# Patient Record
Sex: FEMALE | Race: WHITE | NOT HISPANIC OR LATINO | Employment: OTHER | URBAN - METROPOLITAN AREA
[De-identification: names, ages, dates, MRNs, and addresses within clinical notes are randomized per-mention and may not be internally consistent; named-entity substitution may affect disease eponyms.]

---

## 2017-01-20 DIAGNOSIS — E78.5 HYPERLIPIDEMIA: ICD-10-CM

## 2017-01-20 RX ORDER — ATORVASTATIN CALCIUM 20 MG/1
TABLET, FILM COATED ORAL
Qty: 90 TABLET | Refills: 3 | Status: SHIPPED | OUTPATIENT
Start: 2017-01-20 | End: 2017-12-20 | Stop reason: SDUPTHER

## 2017-05-11 DIAGNOSIS — F41.9 ANXIETY: ICD-10-CM

## 2017-05-11 RX ORDER — GABAPENTIN 100 MG/1
CAPSULE ORAL
Qty: 360 CAPSULE | Refills: 1 | Status: SHIPPED | OUTPATIENT
Start: 2017-05-11 | End: 2017-09-07

## 2017-06-19 DIAGNOSIS — Z12.31 ENCOUNTER FOR SCREENING MAMMOGRAM FOR BREAST CANCER: Primary | ICD-10-CM

## 2017-06-20 ENCOUNTER — OFFICE VISIT (OUTPATIENT)
Dept: INTERNAL MEDICINE | Facility: CLINIC | Age: 67
End: 2017-06-20

## 2017-06-20 ENCOUNTER — APPOINTMENT (OUTPATIENT)
Dept: WOMENS IMAGING | Facility: HOSPITAL | Age: 67
End: 2017-06-20

## 2017-06-20 VITALS
WEIGHT: 122 LBS | TEMPERATURE: 98.3 F | BODY MASS INDEX: 20.83 KG/M2 | SYSTOLIC BLOOD PRESSURE: 130 MMHG | HEIGHT: 64 IN | DIASTOLIC BLOOD PRESSURE: 84 MMHG

## 2017-06-20 DIAGNOSIS — Z86.010 HISTORY OF COLONIC POLYPS: Chronic | ICD-10-CM

## 2017-06-20 DIAGNOSIS — E78.49 OTHER HYPERLIPIDEMIA: Primary | Chronic | ICD-10-CM

## 2017-06-20 DIAGNOSIS — J30.2 SEASONAL ALLERGIC RHINITIS, UNSPECIFIED ALLERGIC RHINITIS TRIGGER: Chronic | ICD-10-CM

## 2017-06-20 DIAGNOSIS — Z12.31 ENCOUNTER FOR SCREENING MAMMOGRAM FOR BREAST CANCER: ICD-10-CM

## 2017-06-20 DIAGNOSIS — R10.11 RIGHT UPPER QUADRANT ABDOMINAL PAIN: ICD-10-CM

## 2017-06-20 DIAGNOSIS — I10 ESSENTIAL HYPERTENSION: ICD-10-CM

## 2017-06-20 LAB
ALBUMIN SERPL-MCNC: 4.4 G/DL (ref 3.4–4.6)
ALBUMIN/GLOB SERPL: 1.6 G/DL
ALP SERPL-CCNC: 57 U/L (ref 46–116)
ALT SERPL W P-5'-P-CCNC: 29 U/L (ref 14–59)
ANION GAP SERPL CALCULATED.3IONS-SCNC: 10 MMOL/L
AST SERPL-CCNC: 26 U/L (ref 7–37)
BILIRUB SERPL-MCNC: 0.7 MG/DL (ref 0.2–1)
BUN BLD-MCNC: 17 MG/DL (ref 6–22)
BUN/CREAT SERPL: 23 (ref 7–25)
CALCIUM SPEC-SCNC: 9 MG/DL (ref 8.6–10.5)
CHLORIDE SERPL-SCNC: 103 MMOL/L (ref 95–107)
CHOLEST SERPL-MCNC: 156 MG/DL (ref 0–200)
CO2 SERPL-SCNC: 29 MMOL/L (ref 23–32)
CREAT BLD-MCNC: 0.74 MG/DL (ref 0.55–1.02)
GFR SERPL CREATININE-BSD FRML MDRD: 79 ML/MIN/1.73
GLOBULIN UR ELPH-MCNC: 2.8 GM/DL
GLUCOSE BLD-MCNC: 95 MG/DL (ref 70–100)
HDLC SERPL-MCNC: 79 MG/DL (ref 40–81)
LDLC SERPL CALC-MCNC: 62 MG/DL (ref 0–100)
LDLC/HDLC SERPL: 0.79 {RATIO}
POTASSIUM BLD-SCNC: 4.6 MMOL/L (ref 3.3–5.3)
PROT SERPL-MCNC: 7.2 G/DL (ref 6.3–8.4)
SODIUM BLD-SCNC: 142 MMOL/L (ref 136–145)
TRIGL SERPL-MCNC: 73 MG/DL (ref 0–150)
VLDLC SERPL-MCNC: 14.6 MG/DL

## 2017-06-20 PROCEDURE — 77067 SCR MAMMO BI INCL CAD: CPT | Performed by: RADIOLOGY

## 2017-06-20 PROCEDURE — 99214 OFFICE O/P EST MOD 30 MIN: CPT | Performed by: INTERNAL MEDICINE

## 2017-06-20 PROCEDURE — 36415 COLL VENOUS BLD VENIPUNCTURE: CPT | Performed by: INTERNAL MEDICINE

## 2017-06-20 PROCEDURE — 80053 COMPREHEN METABOLIC PANEL: CPT | Performed by: INTERNAL MEDICINE

## 2017-06-20 PROCEDURE — 80061 LIPID PANEL: CPT | Performed by: INTERNAL MEDICINE

## 2017-06-20 PROCEDURE — 77067 SCR MAMMO BI INCL CAD: CPT | Performed by: INTERNAL MEDICINE

## 2017-07-05 ENCOUNTER — HOSPITAL ENCOUNTER (OUTPATIENT)
Dept: ULTRASOUND IMAGING | Facility: HOSPITAL | Age: 67
Discharge: HOME | End: 2017-07-05
Attending: INTERNAL MEDICINE | Admitting: INTERNAL MEDICINE

## 2017-07-05 DIAGNOSIS — R10.11 RIGHT UPPER QUADRANT ABDOMINAL PAIN: ICD-10-CM

## 2017-07-05 PROCEDURE — 76705 ECHO EXAM OF ABDOMEN: CPT

## 2017-08-28 ENCOUNTER — APPOINTMENT (OUTPATIENT)
Dept: LAB | Facility: HOSPITAL | Age: 67
End: 2017-08-28

## 2017-08-28 ENCOUNTER — APPOINTMENT (OUTPATIENT)
Dept: ONCOLOGY | Facility: CLINIC | Age: 67
End: 2017-08-28

## 2017-09-07 ENCOUNTER — LAB (OUTPATIENT)
Dept: LAB | Facility: HOSPITAL | Age: 67
End: 2017-09-07

## 2017-09-07 ENCOUNTER — OFFICE VISIT (OUTPATIENT)
Dept: ONCOLOGY | Facility: CLINIC | Age: 67
End: 2017-09-07

## 2017-09-07 VITALS
SYSTOLIC BLOOD PRESSURE: 120 MMHG | DIASTOLIC BLOOD PRESSURE: 82 MMHG | WEIGHT: 125 LBS | TEMPERATURE: 98 F | HEIGHT: 64 IN | BODY MASS INDEX: 21.34 KG/M2 | RESPIRATION RATE: 16 BRPM | HEART RATE: 76 BPM

## 2017-09-07 DIAGNOSIS — D05.02 NEOPLASM OF LEFT BREAST, PRIMARY TUMOR STAGING CATEGORY TIS: LOBULAR CARCINOMA IN SITU (LCIS): Primary | ICD-10-CM

## 2017-09-07 DIAGNOSIS — E78.5 HYPERLIPIDEMIA, UNSPECIFIED HYPERLIPIDEMIA TYPE: Primary | ICD-10-CM

## 2017-09-07 LAB
BASOPHILS # BLD AUTO: 0.03 10*3/MM3 (ref 0–0.1)
BASOPHILS NFR BLD AUTO: 0.4 % (ref 0–1.1)
DEPRECATED RDW RBC AUTO: 41 FL (ref 37–49)
EOSINOPHIL # BLD AUTO: 0.08 10*3/MM3 (ref 0–0.36)
EOSINOPHIL NFR BLD AUTO: 1.1 % (ref 1–5)
ERYTHROCYTE [DISTWIDTH] IN BLOOD BY AUTOMATED COUNT: 12.7 % (ref 11.7–14.5)
HCT VFR BLD AUTO: 42.9 % (ref 34–45)
HGB BLD-MCNC: 14.8 G/DL (ref 11.5–14.9)
IMM GRANULOCYTES # BLD: 0.03 10*3/MM3 (ref 0–0.03)
IMM GRANULOCYTES NFR BLD: 0.4 % (ref 0–0.5)
LYMPHOCYTES # BLD AUTO: 2.24 10*3/MM3 (ref 1–3.5)
LYMPHOCYTES NFR BLD AUTO: 29.9 % (ref 20–49)
MCH RBC QN AUTO: 30.3 PG (ref 27–33)
MCHC RBC AUTO-ENTMCNC: 34.5 G/DL (ref 32–35)
MCV RBC AUTO: 87.9 FL (ref 83–97)
MONOCYTES # BLD AUTO: 0.74 10*3/MM3 (ref 0.25–0.8)
MONOCYTES NFR BLD AUTO: 9.9 % (ref 4–12)
NEUTROPHILS # BLD AUTO: 4.36 10*3/MM3 (ref 1.5–7)
NEUTROPHILS NFR BLD AUTO: 58.3 % (ref 39–75)
NRBC BLD MANUAL-RTO: 0 /100 WBC (ref 0–0)
PLATELET # BLD AUTO: 192 10*3/MM3 (ref 150–375)
PMV BLD AUTO: 9.6 FL (ref 8.9–12.1)
RBC # BLD AUTO: 4.88 10*6/MM3 (ref 3.9–5)
WBC NRBC COR # BLD: 7.48 10*3/MM3 (ref 4–10)

## 2017-09-07 PROCEDURE — 99213 OFFICE O/P EST LOW 20 MIN: CPT | Performed by: INTERNAL MEDICINE

## 2017-09-07 PROCEDURE — 85025 COMPLETE CBC W/AUTO DIFF WBC: CPT | Performed by: INTERNAL MEDICINE

## 2017-09-07 PROCEDURE — 36416 COLLJ CAPILLARY BLOOD SPEC: CPT | Performed by: INTERNAL MEDICINE

## 2017-09-07 RX ORDER — GABAPENTIN 100 MG/1
100 CAPSULE ORAL 2 TIMES DAILY
COMMUNITY
End: 2017-12-20

## 2017-09-07 NOTE — PROGRESS NOTES
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 She had lumpectomy and took 5 years of tamoxifen as breast cancer prevention. She finished her tamoxifen over 10 years ago . She is here today for an annual followup. Her mammogram was performed in June, with no suspicious findings.     It is been recommended that due to her history and the fact that she has dense breasts that she undergo 3-D mammogram yearly.    She is up to date on her colonoscopies  History of Present Illness     Past Medical History, Past Surgical History, Social History, Family History have been reviewed and are without significant changes except as mentioned.    Review of Systems   Constitutional: Negative for activity change, appetite change, fatigue, fever and unexpected weight change.   HENT: Negative for hearing loss, nosebleeds, trouble swallowing and voice change.    Eyes: Negative for visual disturbance.   Respiratory: Negative for cough, shortness of breath and wheezing.    Cardiovascular: Negative for chest pain and palpitations.   Gastrointestinal: Negative for abdominal pain, diarrhea, nausea and vomiting.   Genitourinary: Negative for difficulty urinating, frequency, hematuria and urgency.   Musculoskeletal: Negative for back pain and neck pain.   Skin: Negative for rash.   Neurological: Negative for dizziness, seizures, syncope and headaches.   Hematological: Negative for adenopathy. Does not bruise/bleed easily.   Psychiatric/Behavioral: Negative for behavioral problems. The patient is not nervous/anxious.       A comprehensive 14 point review of systems was performed and was negative except as mentioned.    Medications:  The current medication list was reviewed in the EMR    ALLERGIES:  No Known Allergies    Objective      Vitals:     "09/07/17 0954   BP: 120/82   Pulse: 76   Resp: 16   Temp: 98 °F (36.7 °C)   Weight: 125 lb (56.7 kg)   Height: 63.78\" (162 cm)  Comment: new ht.   PainSc: 0-No pain     Current Status 9/7/2017   ECOG score 0       Physical Exam   Constitutional: She is oriented to person, place, and time. She appears well-developed and well-nourished. No distress.   HENT:   Head: Normocephalic.   Eyes: EOM are normal. Pupils are equal, round, and reactive to light.   Neck: Neck supple. No JVD present. No thyromegaly present.   Cardiovascular: Normal rate and regular rhythm.  Exam reveals no gallop and no friction rub.    No murmur heard.  Pulmonary/Chest: Effort normal and breath sounds normal. She has no wheezes. She has no rales. Right breast exhibits no mass, no nipple discharge and no skin change. Left breast exhibits no mass, no nipple discharge and no skin change.   Abdominal: Soft. Bowel sounds are normal. She exhibits no distension and no mass. There is no tenderness.   Musculoskeletal: She exhibits no edema or deformity.   Neurological: She is alert and oriented to person, place, and time. She has normal reflexes. No cranial nerve deficit.   Skin: Skin is dry. No rash noted.   Psychiatric: She has a normal mood and affect. Judgment normal.        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 She continues to do well with no evidence of malignancy on exam or on " her mammograms.     PLAN:   1. The patient will return to see us again in one year for routine followup.   2. She will be due for annual screening mammogram again in June 2018. Because of her history and the fact that she has dense breasts it has been recommended that she undergo 3-D mammographic imaging yearly therefore her next mammogram in June 2018 may need to be scheduled at [facility].              9/7/2017      CC:

## 2017-12-20 ENCOUNTER — OFFICE VISIT (OUTPATIENT)
Dept: INTERNAL MEDICINE | Facility: CLINIC | Age: 67
End: 2017-12-20

## 2017-12-20 ENCOUNTER — PATIENT MESSAGE (OUTPATIENT)
Dept: INTERNAL MEDICINE | Facility: CLINIC | Age: 67
End: 2017-12-20

## 2017-12-20 VITALS
DIASTOLIC BLOOD PRESSURE: 84 MMHG | TEMPERATURE: 97.9 F | BODY MASS INDEX: 21.97 KG/M2 | HEIGHT: 63 IN | WEIGHT: 124 LBS | HEART RATE: 70 BPM | RESPIRATION RATE: 17 BRPM | OXYGEN SATURATION: 98 % | SYSTOLIC BLOOD PRESSURE: 134 MMHG

## 2017-12-20 DIAGNOSIS — E78.2 MIXED HYPERLIPIDEMIA: ICD-10-CM

## 2017-12-20 DIAGNOSIS — I10 ESSENTIAL HYPERTENSION: Primary | ICD-10-CM

## 2017-12-20 LAB
ALBUMIN SERPL-MCNC: 4.6 G/DL (ref 3.5–5.2)
ALBUMIN/GLOB SERPL: 1.7 G/DL
ALP SERPL-CCNC: 60 U/L (ref 39–117)
ALT SERPL-CCNC: 22 U/L (ref 1–33)
AST SERPL-CCNC: 23 U/L (ref 1–32)
BILIRUB SERPL-MCNC: 0.4 MG/DL (ref 0.1–1.2)
BILIRUB UR QL STRIP: NEGATIVE
BUN SERPL-MCNC: 19 MG/DL (ref 8–23)
BUN/CREAT SERPL: 25.7 (ref 7–25)
CALCIUM SERPL-MCNC: 9.6 MG/DL (ref 8.6–10.5)
CHLORIDE SERPL-SCNC: 100 MMOL/L (ref 98–107)
CHOLEST SERPL-MCNC: 159 MG/DL (ref 0–200)
CLARITY UR: CLEAR
CO2 SERPL-SCNC: 28.3 MMOL/L (ref 22–29)
COLOR UR: YELLOW
CREAT SERPL-MCNC: 0.74 MG/DL (ref 0.57–1)
GLOBULIN SER CALC-MCNC: 2.7 GM/DL
GLUCOSE SERPL-MCNC: 81 MG/DL (ref 65–99)
GLUCOSE UR STRIP-MCNC: NEGATIVE MG/DL
HDLC SERPL-MCNC: 87 MG/DL (ref 40–60)
HGB UR QL STRIP.AUTO: NEGATIVE
KETONES UR QL STRIP: NEGATIVE
LDLC SERPL CALC-MCNC: 60 MG/DL (ref 0–100)
LEUKOCYTE ESTERASE UR QL STRIP.AUTO: NEGATIVE
NITRITE UR QL STRIP: NEGATIVE
PH UR STRIP.AUTO: 5.5 [PH] (ref 5–8)
POTASSIUM SERPL-SCNC: 4.4 MMOL/L (ref 3.5–5.2)
PROT SERPL-MCNC: 7.3 G/DL (ref 6–8.5)
PROT UR QL STRIP: NEGATIVE
SODIUM SERPL-SCNC: 141 MMOL/L (ref 136–145)
SP GR UR STRIP: 1.02 (ref 1–1.03)
TRIGL SERPL-MCNC: 61 MG/DL (ref 0–150)
UROBILINOGEN UR QL STRIP: NORMAL
VLDLC SERPL-MCNC: 12.2 MG/DL (ref 5–40)

## 2017-12-20 PROCEDURE — 99213 OFFICE O/P EST LOW 20 MIN: CPT | Performed by: INTERNAL MEDICINE

## 2017-12-20 PROCEDURE — 81003 URINALYSIS AUTO W/O SCOPE: CPT | Performed by: INTERNAL MEDICINE

## 2017-12-20 PROCEDURE — G0402 INITIAL PREVENTIVE EXAM: HCPCS | Performed by: INTERNAL MEDICINE

## 2017-12-20 RX ORDER — ATORVASTATIN CALCIUM 20 MG/1
20 TABLET, FILM COATED ORAL DAILY
Qty: 90 TABLET | Refills: 1 | Status: SHIPPED | OUTPATIENT
Start: 2017-12-20 | End: 2018-09-06 | Stop reason: SDUPTHER

## 2017-12-20 NOTE — TELEPHONE ENCOUNTER
From: Mag Palomino  To: Purvi Martinez MD  Sent: 12/20/2017 1:41 PM EST  Subject: Visit Follow-Up Question    Dr. Martinez'  I received the PPPSV23 shot at the pharmacy at the [facility] on 12/20/17.

## 2017-12-20 NOTE — PROGRESS NOTES
"Subjective   Mag Palomino is a 67 y.o. female here for   Chief Complaint   Patient presents with   • Initial Medicare Wellness   • Hypertension   • Hyperlipidemia   .    Vitals:    12/20/17 0849 12/20/17 0958   BP: 124/86 134/84   BP Location: Left arm    Patient Position: Sitting    Cuff Size: Adult    Pulse: 70    Resp: 17    Temp: 97.9 °F (36.6 °C)    TempSrc: Temporal Artery     SpO2: 98%    Weight: 56.2 kg (124 lb)    Height: 160.7 cm (63.25\")        Body mass index is 21.79 kg/(m^2).    Hypertension   This is a chronic problem. The current episode started more than 1 year ago. The problem is unchanged. The problem is controlled. Pertinent negatives include no chest pain, palpitations or shortness of breath. There is no history of chronic renal disease.   Hyperlipidemia   This is a chronic problem. The current episode started more than 1 year ago. The problem is controlled. Recent lipid tests were reviewed and are normal. She has no history of chronic renal disease, diabetes, liver disease or obesity. Pertinent negatives include no chest pain or shortness of breath.        The following portions of the patient's history were reviewed and updated as appropriate: allergies, current medications, past social history and problem list.    Review of Systems   Constitutional: Negative for chills, fatigue and fever.   Respiratory: Negative for cough, shortness of breath and wheezing.    Cardiovascular: Negative for chest pain, palpitations and leg swelling.   Psychiatric/Behavioral: Negative for dysphoric mood and sleep disturbance. The patient is not nervous/anxious.        Objective   Physical Exam   Constitutional: She appears well-developed and well-nourished. No distress.   Cardiovascular: Normal rate, regular rhythm and normal heart sounds.    Pulmonary/Chest: No respiratory distress. She has no wheezes. She has no rales. She exhibits no tenderness.   Musculoskeletal: She exhibits no edema.   Psychiatric: " She has a normal mood and affect. Her behavior is normal.   Nursing note and vitals reviewed.      Assessment/Plan   Diagnoses and all orders for this visit:    Essential hypertension  Comments:  taking aldactone for skin per derm - she would likely have high bp without this med - call if bp over 140/90  Orders:  -     Comprehensive Metabolic Panel; Future  -     Lipid Panel; Future  -     Urinalysis With / Microscopic If Indicated - Urine, Clean Catch; Future    Mixed hyperlipidemia  Comments:  continue lipitor, diet & exercise  Orders:  -     atorvastatin (LIPITOR) 20 MG tablet; Take 1 tablet by mouth Daily.  -     Lipid Panel; Future          Wellness today.   Need daily strengthening & balance exercises (shown today).   Need screening for AAA & carotid disease.  Information given today.

## 2017-12-20 NOTE — PROGRESS NOTES
QUICK REFERENCE INFORMATION:  The ABCs of the Annual Wellness Visit    Initial Medicare Wellness Visit    HEALTH RISK ASSESSMENT    1950    Recent Hospitalizations:  No hospitalization(s) within the last year..        Current Medical Providers:  Patient Care Team:  Purvi Martinez MD as PCP - General  Michael James MD as Consulting Physician (Hematology and Oncology)  Yessy Hui MD as Consulting Physician (Dermatology)  Yifan Salmeron MD as Consulting Physician (Otolaryngology)  Jose De La Torre MD as Consulting Physician (Cardiology)  Purvi Martinez MD as Referring Physician (Internal Medicine)        Smoking Status:  History   Smoking Status   • Never Smoker   Smokeless Tobacco   • Never Used       Alcohol Consumption:  History   Alcohol Use   • Yes     Comment: social       Depression Screen:   PHQ-2/PHQ-9 Depression Screening 12/20/2017   Little interest or pleasure in doing things 0   Feeling down, depressed, or hopeless 0   Trouble falling or staying asleep, or sleeping too much 0   Feeling tired or having little energy 0   Poor appetite or overeating 0   Feeling bad about yourself - or that you are a failure or have let yourself or your family down 0   Trouble concentrating on things, such as reading the newspaper or watching television 0   Moving or speaking so slowly that other people could have noticed. Or the opposite - being so fidgety or restless that you have been moving around a lot more than usual 0   Thoughts that you would be better off dead, or of hurting yourself in some way 0   Total Score 0       Health Habits and Functional and Cognitive Screening:  No flowsheet data found.        Does the patient have evidence of cognitive impairment? No    Asiprin use counseling: Does not need ASA (and currently is not on it)      Recent Lab Results:    Visual Acuity:  No exam data present    Age-appropriate Screening Schedule:  Refer to the list below for future screening  recommendations based on patient's age, sex and/or medical conditions. Orders for these recommended tests are listed in the plan section. The patient has been provided with a written plan.    Health Maintenance   Topic Date Due   • PNEUMOCOCCAL VACCINES (65+ LOW/MEDIUM RISK) (2 of 2 - PPSV23) 10/06/2017   • LIPID PANEL  12/20/2018   • MAMMOGRAM  06/23/2019   • COLONOSCOPY  07/16/2020   • TDAP/TD VACCINES (2 - Td) 08/01/2023   • INFLUENZA VACCINE  Completed   • ZOSTER VACCINE  Completed        Subjective   History of Present Illness    Mag Palomino is a 67 y.o. female who presents for an Annual Wellness Visit.    The following portions of the patient's history were reviewed and updated as appropriate: allergies, current medications, past family history, past medical history, past social history, past surgical history and problem list.    Outpatient Medications Prior to Visit   Medication Sig Dispense Refill   • acetaminophen (TYLENOL) 325 MG tablet Take 650 mg by mouth Every 6 (Six) Hours As Needed for mild pain (1-3).     • cetirizine (ZyrTEC) 10 MG tablet Take 10 mg by mouth daily.     • docusate sodium (COLACE) 100 MG capsule Take 100 mg by mouth as needed for constipation.     • Multiple Vitamin (MULTIVITAMINS PO) Take 1 tablet by mouth Daily.     • Omega-3 Fatty Acids (FISH OIL) 1000 MG capsule capsule Take 1,000 mg by mouth Daily With Breakfast.     • spironolactone (ALDACTONE) 100 MG tablet Take 1 tablet by mouth daily.     • atorvastatin (LIPITOR) 20 MG tablet TAKE 1 TABLET DAILY 90 tablet 3   • gabapentin (NEURONTIN) 100 MG capsule Take 100 mg by mouth 2 (Two) Times a Day.       No facility-administered medications prior to visit.        Patient Active Problem List   Diagnosis   • Allergic rhinitis   • Backache   • Hyperlipidemia   • Dizzy   • History of colonic polyps   • Palpitation   • Neoplasm of left breast, primary tumor staging category Tis: lobular carcinoma in situ (LCIS)   • Arm pain   •  "Constipation   • Hair loss   • Hypertension       Advance Care Planning:  has an advance directive - a copy has been provided and is in file    Identification of Risk Factors:  Risk factors include: none.    Review of Systems    Compared to one year ago, the patient feels her physical health is the same.  Compared to one year ago, the patient feels her mental health is the same.    Objective     Physical Exam    Vitals:    12/20/17 0849   BP: 124/86   BP Location: Left arm   Patient Position: Sitting   Cuff Size: Adult   Pulse: 70   Resp: 17   Temp: 97.9 °F (36.6 °C)   TempSrc: Temporal Artery    SpO2: 98%   Weight: 56.2 kg (124 lb)   Height: 160.7 cm (63.25\")   PainSc: 0-No pain       Body mass index is 21.79 kg/(m^2).  Discussed the patient's BMI with her. BMI is within normal parameters. No follow-up required.    Assessment/Plan   Patient Self-Management and Personalized Health Advice  The patient has been provided with information about: diet, exercise, weight management, prevention of cardiac or vascular disease, the relationship between weight and GERD and fall prevention and preventive services including:   · Exercise counseling provided, Fall Risk assessment done, Fall Risk plan of care done, Nutrition counseling provided, Screening for AAA, referral for ultrasound placed.    Visit Diagnoses:    ICD-10-CM ICD-9-CM   1. Essential hypertension I10 401.9   2. Mixed hyperlipidemia E78.2 272.2       Orders Placed This Encounter   Procedures   • Comprehensive Metabolic Panel     Standing Status:   Future     Standing Expiration Date:   12/20/2018   • Lipid Panel     Standing Status:   Future     Standing Expiration Date:   12/20/2018   • Urinalysis With / Microscopic If Indicated - Urine, Clean Catch     Standing Status:   Future     Standing Expiration Date:   12/20/2018       Outpatient Encounter Prescriptions as of 12/20/2017   Medication Sig Dispense Refill   • acetaminophen (TYLENOL) 325 MG tablet Take 650 mg " by mouth Every 6 (Six) Hours As Needed for mild pain (1-3).     • atorvastatin (LIPITOR) 20 MG tablet Take 1 tablet by mouth Daily. 90 tablet 1   • cetirizine (ZyrTEC) 10 MG tablet Take 10 mg by mouth daily.     • docusate sodium (COLACE) 100 MG capsule Take 100 mg by mouth as needed for constipation.     • Multiple Vitamin (MULTIVITAMINS PO) Take 1 tablet by mouth Daily.     • Omega-3 Fatty Acids (FISH OIL) 1000 MG capsule capsule Take 1,000 mg by mouth Daily With Breakfast.     • spironolactone (ALDACTONE) 100 MG tablet Take 1 tablet by mouth daily.     • [DISCONTINUED] atorvastatin (LIPITOR) 20 MG tablet TAKE 1 TABLET DAILY 90 tablet 3   • [DISCONTINUED] gabapentin (NEURONTIN) 100 MG capsule Take 100 mg by mouth 2 (Two) Times a Day.       No facility-administered encounter medications on file as of 12/20/2017.        Reviewed use of high risk medication in the elderly: not applicable  Reviewed for potential of harmful drug interactions in the elderly: not applicable    Follow Up:  Return in about 6 months (around 6/20/2018) for Recheck.     An After Visit Summary and PPPS with all of these plans were given to the patient.

## 2017-12-20 NOTE — PATIENT INSTRUCTIONS
Medicare Wellness  Personal Prevention Plan of Service     Date of Office Visit:  2017  Encounter Provider:  Purvi Martinez MD  Place of Service:  [facility]  Patient Name: Mag Palomino  :  1950    As part of the Medicare Wellness portion of your visit today, we are providing you with this personalized preventive plan of services (PPPS). This plan is based upon recommendations of the [place] Preventive Services Task Force (USPSTF) and the Advisory Committee on Immunization Practices (ACIP).    This lists the preventive care services that should be considered, and provides dates of when you are due. Items listed as completed are up-to-date and do not require any further intervention.    Health Maintenance   Topic Date Due   • PNEUMOCOCCAL VACCINES (65+ LOW/MEDIUM RISK) (2 of 2 - PPSV23) 10/06/2017   • MEDICARE ANNUAL WELLNESS  2018   • LIPID PANEL  2018   • MAMMOGRAM  2019   • COLONOSCOPY  2020   • TDAP/TD VACCINES (2 - Td) 2023   • HEPATITIS C SCREENING  Completed   • INFLUENZA VACCINE  Completed   • ZOSTER VACCINE  Completed       Orders Placed This Encounter   Procedures   • Comprehensive Metabolic Panel     Standing Status:   Future     Standing Expiration Date:   2018   • Lipid Panel     Standing Status:   Future     Standing Expiration Date:   2018   • Urinalysis With / Microscopic If Indicated - Urine, Clean Catch     Standing Status:   Future     Standing Expiration Date:   2018       No Follow-up on file.

## 2017-12-21 ENCOUNTER — DOCUMENTATION (OUTPATIENT)
Dept: INTERNAL MEDICINE | Facility: CLINIC | Age: 67
End: 2017-12-21

## 2017-12-21 DIAGNOSIS — Z12.31 ENCOUNTER FOR SCREENING MAMMOGRAM FOR BREAST CANCER: Primary | ICD-10-CM

## 2017-12-21 NOTE — PROGRESS NOTES
Her oncologist wants her to get 3D mammo in 6 mos (screening) b/c dense breasts - ordered screening bilat mammo with meredith

## 2018-06-20 ENCOUNTER — OFFICE VISIT (OUTPATIENT)
Dept: INTERNAL MEDICINE | Facility: CLINIC | Age: 68
End: 2018-06-20

## 2018-06-20 VITALS
BODY MASS INDEX: 21.76 KG/M2 | DIASTOLIC BLOOD PRESSURE: 90 MMHG | SYSTOLIC BLOOD PRESSURE: 144 MMHG | HEIGHT: 63 IN | WEIGHT: 122.8 LBS

## 2018-06-20 DIAGNOSIS — E78.5 HYPERLIPIDEMIA, UNSPECIFIED HYPERLIPIDEMIA TYPE: Chronic | ICD-10-CM

## 2018-06-20 DIAGNOSIS — I10 ESSENTIAL HYPERTENSION: Primary | ICD-10-CM

## 2018-06-20 LAB
CHOLEST SERPL-MCNC: 162 MG/DL (ref 0–200)
HDLC SERPL-MCNC: 72 MG/DL (ref 40–60)
LDLC SERPL CALC-MCNC: 71 MG/DL (ref 0–100)
LDLC/HDLC SERPL: 0.99 {RATIO}
TRIGL SERPL-MCNC: 95 MG/DL (ref 0–150)
VLDLC SERPL-MCNC: 19 MG/DL (ref 5–40)

## 2018-06-20 PROCEDURE — 36415 COLL VENOUS BLD VENIPUNCTURE: CPT | Performed by: INTERNAL MEDICINE

## 2018-06-20 PROCEDURE — 99213 OFFICE O/P EST LOW 20 MIN: CPT | Performed by: INTERNAL MEDICINE

## 2018-06-20 PROCEDURE — 80061 LIPID PANEL: CPT | Performed by: INTERNAL MEDICINE

## 2018-06-20 NOTE — PROGRESS NOTES
"Subjective   Mag Palomino is a 68 y.o. female here for   Chief Complaint   Patient presents with   • Hyperlipidemia     6 month follow-up   • Hypertension   .    Vitals:    06/20/18 0842   BP: 144/90   BP Location: Left arm   Patient Position: Sitting   Cuff Size: Adult   Weight: 55.7 kg (122 lb 12.8 oz)   Height: 160.7 cm (63.25\")       Body mass index is 21.58 kg/m².    Hyperlipidemia   This is a chronic problem. The current episode started more than 1 year ago. The problem is controlled. Recent lipid tests were reviewed and are normal. She has no history of diabetes or obesity. Pertinent negatives include no chest pain or shortness of breath.   Hypertension   This is a chronic problem. The current episode started more than 1 year ago. The problem is unchanged. The problem is controlled. Pertinent negatives include no chest pain, palpitations or shortness of breath.        The following portions of the patient's history were reviewed and updated as appropriate: allergies, current medications, past social history and problem list.    Review of Systems   Constitutional: Negative for chills, fatigue and fever.   HENT: Positive for postnasal drip.    Respiratory: Negative for cough, shortness of breath and wheezing.    Cardiovascular: Negative for chest pain, palpitations and leg swelling.   Allergic/Immunologic: Positive for environmental allergies.   Psychiatric/Behavioral: Negative for dysphoric mood and sleep disturbance. The patient is not nervous/anxious.        Objective   Physical Exam   Constitutional: She appears well-developed and well-nourished. No distress.   Cardiovascular: Normal rate, regular rhythm and normal heart sounds.    Pulmonary/Chest: No respiratory distress. She has no wheezes. She has no rales. She exhibits no tenderness.   Musculoskeletal: She exhibits no edema.   Psychiatric: She has a normal mood and affect. Her behavior is normal.   Nursing note and vitals " reviewed.      Assessment/Plan   Diagnoses and all orders for this visit:    Essential hypertension  Comments:  stable - call if bp over 140/90    Hyperlipidemia, unspecified hyperlipidemia type  Comments:  continue diet/ex  Orders:  -     Lipid Panel; Future

## 2018-07-10 ENCOUNTER — HOSPITAL ENCOUNTER (OUTPATIENT)
Dept: MAMMOGRAPHY | Facility: HOSPITAL | Age: 68
Discharge: HOME | End: 2018-07-10
Attending: INTERNAL MEDICINE | Admitting: INTERNAL MEDICINE

## 2018-07-10 DIAGNOSIS — Z12.31 ENCOUNTER FOR SCREENING MAMMOGRAM FOR BREAST CANCER: ICD-10-CM

## 2018-07-10 PROCEDURE — 77063 BREAST TOMOSYNTHESIS BI: CPT

## 2018-07-10 PROCEDURE — 77067 SCR MAMMO BI INCL CAD: CPT

## 2018-09-06 DIAGNOSIS — E78.2 MIXED HYPERLIPIDEMIA: ICD-10-CM

## 2018-09-06 RX ORDER — ATORVASTATIN CALCIUM 20 MG/1
TABLET, FILM COATED ORAL
Qty: 90 TABLET | Refills: 1 | Status: SHIPPED | OUTPATIENT
Start: 2018-09-06 | End: 2019-01-21 | Stop reason: SDUPTHER

## 2018-09-11 ENCOUNTER — OFFICE VISIT (OUTPATIENT)
Dept: ONCOLOGY | Facility: CLINIC | Age: 68
End: 2018-09-11

## 2018-09-11 ENCOUNTER — APPOINTMENT (OUTPATIENT)
Dept: OTHER | Facility: HOSPITAL | Age: 68
End: 2018-09-11

## 2018-09-11 ENCOUNTER — APPOINTMENT (OUTPATIENT)
Dept: ONCOLOGY | Facility: CLINIC | Age: 68
End: 2018-09-11

## 2018-09-11 ENCOUNTER — LAB (OUTPATIENT)
Dept: OTHER | Facility: HOSPITAL | Age: 68
End: 2018-09-11

## 2018-09-11 VITALS
HEART RATE: 77 BPM | WEIGHT: 125 LBS | OXYGEN SATURATION: 99 % | RESPIRATION RATE: 16 BRPM | HEIGHT: 64 IN | DIASTOLIC BLOOD PRESSURE: 74 MMHG | SYSTOLIC BLOOD PRESSURE: 134 MMHG | BODY MASS INDEX: 21.34 KG/M2 | TEMPERATURE: 97.9 F

## 2018-09-11 DIAGNOSIS — D05.02 NEOPLASM OF LEFT BREAST, PRIMARY TUMOR STAGING CATEGORY TIS: LOBULAR CARCINOMA IN SITU (LCIS): Primary | ICD-10-CM

## 2018-09-11 LAB
BASOPHILS # BLD AUTO: 0.03 10*3/MM3 (ref 0–0.2)
BASOPHILS NFR BLD AUTO: 0.4 % (ref 0–1.5)
DEPRECATED RDW RBC AUTO: 41.2 FL (ref 37–54)
EOSINOPHIL # BLD AUTO: 0.04 10*3/MM3 (ref 0–0.7)
EOSINOPHIL NFR BLD AUTO: 0.5 % (ref 0.3–6.2)
ERYTHROCYTE [DISTWIDTH] IN BLOOD BY AUTOMATED COUNT: 12.6 % (ref 11.7–13)
HCT VFR BLD AUTO: 43.1 % (ref 35.6–45.5)
HGB BLD-MCNC: 14.5 G/DL (ref 11.9–15.5)
IMM GRANULOCYTES # BLD: 0.02 10*3/MM3 (ref 0–0.03)
IMM GRANULOCYTES NFR BLD: 0.3 % (ref 0–0.5)
LYMPHOCYTES # BLD AUTO: 1.81 10*3/MM3 (ref 0.9–4.8)
LYMPHOCYTES NFR BLD AUTO: 24.4 % (ref 19.6–45.3)
MCH RBC QN AUTO: 30 PG (ref 26.9–32)
MCHC RBC AUTO-ENTMCNC: 33.6 G/DL (ref 32.4–36.3)
MCV RBC AUTO: 89 FL (ref 80.5–98.2)
MONOCYTES # BLD AUTO: 0.55 10*3/MM3 (ref 0.2–1.2)
MONOCYTES NFR BLD AUTO: 7.4 % (ref 5–12)
NEUTROPHILS # BLD AUTO: 4.97 10*3/MM3 (ref 1.9–8.1)
NEUTROPHILS NFR BLD AUTO: 67 % (ref 42.7–76)
NRBC BLD MANUAL-RTO: 0 /100 WBC (ref 0–0)
PLATELET # BLD AUTO: 224 10*3/MM3 (ref 140–500)
PMV BLD AUTO: 9.5 FL (ref 6–12)
RBC # BLD AUTO: 4.84 10*6/MM3 (ref 3.9–5.2)
WBC NRBC COR # BLD: 7.42 10*3/MM3 (ref 4.5–10.7)

## 2018-09-11 PROCEDURE — 99213 OFFICE O/P EST LOW 20 MIN: CPT | Performed by: INTERNAL MEDICINE

## 2018-09-11 PROCEDURE — 85025 COMPLETE CBC W/AUTO DIFF WBC: CPT | Performed by: INTERNAL MEDICINE

## 2018-09-11 PROCEDURE — 36415 COLL VENOUS BLD VENIPUNCTURE: CPT

## 2018-09-11 NOTE — PROGRESS NOTES
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 She had lumpectomy and took 5 years of tamoxifen as breast cancer prevention. She finished her tamoxifen over 10 years ago . She is here today for an annual followup. Her mammogram was performed 7/10/2018 with no suspicious findings.    It is been recommended that due to her history and the fact that she has dense breasts that she undergo 3-D mammogram yearly.    She is up to date on her colonoscopies  History of Present Illness     Past Medical History, Past Surgical History, Social History, Family History have been reviewed and are without significant changes except as mentioned.    Review of Systems   Constitutional: Negative for activity change, appetite change, fatigue, fever and unexpected weight change.   HENT: Negative for hearing loss, nosebleeds, trouble swallowing and voice change.    Eyes: Negative for visual disturbance.   Respiratory: Negative for cough, shortness of breath and wheezing.    Cardiovascular: Negative for chest pain and palpitations.   Gastrointestinal: Negative for abdominal pain, diarrhea, nausea and vomiting.   Genitourinary: Negative for difficulty urinating, frequency, hematuria and urgency.   Musculoskeletal: Negative for back pain and neck pain.   Skin: Negative for rash.   Neurological: Negative for dizziness, seizures, syncope and headaches.   Hematological: Negative for adenopathy. Does not bruise/bleed easily.   Psychiatric/Behavioral: Negative for behavioral problems. The patient is not nervous/anxious.       A comprehensive 14 point review of systems was performed and was negative except as mentioned.    Medications:  The current medication list was reviewed in the EMR    ALLERGIES:  No Known Allergies    Objective      Vitals:     "09/11/18 1238   BP: 134/74   Pulse: 77   Resp: 16   Temp: 97.9 °F (36.6 °C)   TempSrc: Oral   SpO2: 99%   Weight: 56.7 kg (125 lb)   Height: 163.5 cm (64.37\")   PainSc: 0-No pain     Current Status 9/11/2018   ECOG score 0       Physical Exam   Constitutional: She is oriented to person, place, and time. She appears well-developed and well-nourished. No distress.   HENT:   Head: Normocephalic.   Eyes: Pupils are equal, round, and reactive to light. EOM are normal.   Neck: Neck supple. No JVD present. No thyromegaly present.   Cardiovascular: Normal rate and regular rhythm.  Exam reveals no gallop and no friction rub.    No murmur heard.  Pulmonary/Chest: Effort normal and breath sounds normal. She has no wheezes. She has no rales. Right breast exhibits no mass, no nipple discharge and no skin change. Left breast exhibits no mass, no nipple discharge and no skin change.   Abdominal: Soft. Bowel sounds are normal. She exhibits no distension and no mass. There is no tenderness.   Musculoskeletal: She exhibits no edema or deformity.   Neurological: She is alert and oriented to person, place, and time. She has normal reflexes. No cranial nerve deficit.   Skin: Skin is dry. No rash noted.   Psychiatric: She has a normal mood and affect. Judgment normal.        RECENT LABS:  Hematology WBC   Date Value Ref Range Status   09/11/2018 7.42 4.50 - 10.70 10*3/mm3 Final     RBC   Date Value Ref Range Status   09/11/2018 4.84 3.90 - 5.20 10*6/mm3 Final     Hemoglobin   Date Value Ref Range Status   09/11/2018 14.5 11.9 - 15.5 g/dL Final     Hematocrit   Date Value Ref Range Status   09/11/2018 43.1 35.6 - 45.5 % Final     Platelets   Date Value Ref Range Status   09/11/2018 224 140 - 500 10*3/mm3 Final          SCREENING MAMMOGRAM WITH R2 COMPUTERIZED ASSISTED DETECTION AND DIGITAL  TOMOSYNTHESIS 7/10/2018     HISTORY: Screening.     FINDINGS:  Standard images and R2 were obtained followed by digital  Tomosynthesis. There is " scattered fibroglandular density which is  symmetric. There are no findings to suggest malignancy.     CONCLUSION: Negative mammogram showing no change from 06/20/2017.      BI-RADS CATEGORY 1: Negative.    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 She continues to do well with no evidence of malignancy on exam or on her mammograms.     PLAN:   1. The patient will return to see us again in one year for routine followup.   She will be due for annual screening mammogram again in July 2019 9/11/2018      CC:

## 2018-10-18 DIAGNOSIS — Z78.0 POST-MENOPAUSAL: Primary | ICD-10-CM

## 2018-10-24 ENCOUNTER — CLINICAL SUPPORT (OUTPATIENT)
Dept: INTERNAL MEDICINE | Facility: CLINIC | Age: 68
End: 2018-10-24

## 2018-10-24 DIAGNOSIS — Z78.0 POST-MENOPAUSAL: ICD-10-CM

## 2018-10-24 PROCEDURE — 77080 DXA BONE DENSITY AXIAL: CPT | Performed by: INTERNAL MEDICINE

## 2019-01-21 ENCOUNTER — OFFICE VISIT (OUTPATIENT)
Dept: INTERNAL MEDICINE | Facility: CLINIC | Age: 69
End: 2019-01-21

## 2019-01-21 VITALS
HEIGHT: 63 IN | BODY MASS INDEX: 22.36 KG/M2 | SYSTOLIC BLOOD PRESSURE: 140 MMHG | OXYGEN SATURATION: 98 % | WEIGHT: 126.2 LBS | TEMPERATURE: 97.7 F | DIASTOLIC BLOOD PRESSURE: 90 MMHG | RESPIRATION RATE: 16 BRPM | HEART RATE: 73 BPM

## 2019-01-21 DIAGNOSIS — R53.82 CHRONIC FATIGUE: ICD-10-CM

## 2019-01-21 DIAGNOSIS — J30.2 SEASONAL ALLERGIC RHINITIS, UNSPECIFIED TRIGGER: Chronic | ICD-10-CM

## 2019-01-21 DIAGNOSIS — L65.9 HAIR LOSS: ICD-10-CM

## 2019-01-21 DIAGNOSIS — Z86.010 HISTORY OF COLONIC POLYPS: Chronic | ICD-10-CM

## 2019-01-21 DIAGNOSIS — E78.2 MIXED HYPERLIPIDEMIA: Primary | ICD-10-CM

## 2019-01-21 LAB
ALBUMIN SERPL-MCNC: 4.7 G/DL (ref 3.5–5.2)
ALBUMIN/GLOB SERPL: 2 G/DL
ALP SERPL-CCNC: 47 U/L (ref 39–117)
ALT SERPL W P-5'-P-CCNC: 27 U/L (ref 1–33)
ANION GAP SERPL CALCULATED.3IONS-SCNC: 11.1 MMOL/L
AST SERPL-CCNC: 21 U/L (ref 1–32)
BILIRUB SERPL-MCNC: 0.5 MG/DL (ref 0.1–1.2)
BUN BLD-MCNC: 20 MG/DL (ref 8–23)
BUN/CREAT SERPL: 28.6 (ref 7–25)
CALCIUM SPEC-SCNC: 9.6 MG/DL (ref 8.6–10.5)
CHLORIDE SERPL-SCNC: 102 MMOL/L (ref 98–107)
CHOLEST SERPL-MCNC: 155 MG/DL (ref 0–200)
CO2 SERPL-SCNC: 27.9 MMOL/L (ref 22–29)
CREAT BLD-MCNC: 0.7 MG/DL (ref 0.57–1)
GFR SERPL CREATININE-BSD FRML MDRD: 83 ML/MIN/1.73
GLOBULIN UR ELPH-MCNC: 2.4 GM/DL
GLUCOSE BLD-MCNC: 97 MG/DL (ref 65–99)
HDLC SERPL-MCNC: 78 MG/DL (ref 40–60)
LDLC SERPL CALC-MCNC: 63 MG/DL (ref 0–100)
LDLC/HDLC SERPL: 0.81 {RATIO}
POTASSIUM BLD-SCNC: 4.2 MMOL/L (ref 3.5–5.2)
PROT SERPL-MCNC: 7.1 G/DL (ref 6–8.5)
SODIUM BLD-SCNC: 141 MMOL/L (ref 136–145)
TRIGL SERPL-MCNC: 71 MG/DL (ref 0–150)
TSH SERPL DL<=0.05 MIU/L-ACNC: 3.14 MIU/ML (ref 0.27–4.2)
VLDLC SERPL-MCNC: 14.2 MG/DL (ref 5–40)

## 2019-01-21 PROCEDURE — 84443 ASSAY THYROID STIM HORMONE: CPT | Performed by: INTERNAL MEDICINE

## 2019-01-21 PROCEDURE — 99213 OFFICE O/P EST LOW 20 MIN: CPT | Performed by: INTERNAL MEDICINE

## 2019-01-21 PROCEDURE — 80053 COMPREHEN METABOLIC PANEL: CPT | Performed by: INTERNAL MEDICINE

## 2019-01-21 PROCEDURE — G0439 PPPS, SUBSEQ VISIT: HCPCS | Performed by: INTERNAL MEDICINE

## 2019-01-21 PROCEDURE — 80061 LIPID PANEL: CPT | Performed by: INTERNAL MEDICINE

## 2019-01-21 PROCEDURE — 36415 COLL VENOUS BLD VENIPUNCTURE: CPT | Performed by: INTERNAL MEDICINE

## 2019-01-21 RX ORDER — ATORVASTATIN CALCIUM 20 MG/1
20 TABLET, FILM COATED ORAL DAILY
Qty: 90 TABLET | Refills: 1 | Status: SHIPPED | OUTPATIENT
Start: 2019-01-21 | End: 2019-09-13 | Stop reason: SDUPTHER

## 2019-01-21 NOTE — PROGRESS NOTES
QUICK REFERENCE INFORMATION:  The ABCs of the Annual Wellness Visit    Initial Medicare Wellness Visit    HEALTH RISK ASSESSMENT    1950    Recent Hospitalizations:  No hospitalization(s) within the last year..        Current Medical Providers:  Patient Care Team:  Purvi Martinez MD as PCP - General  JamesMichael decker MD as Consulting Physician (Hematology and Oncology)  Yessy Hui MD as Consulting Physician (Dermatology)  Yifan Salmeron MD as Consulting Physician (Otolaryngology)  Jose De La Torre MD as Consulting Physician (Cardiology)  Purvi Martinez MD as Referring Physician (Internal Medicine)        Smoking Status:  Social History     Tobacco Use   Smoking Status Never Smoker   Smokeless Tobacco Never Used       Alcohol Consumption:  Social History     Substance and Sexual Activity   Alcohol Use Yes   • Types: 1 - 2 Glasses of wine per week    Comment: social       Depression Screen:   PHQ-2/PHQ-9 Depression Screening 1/21/2019   Little interest or pleasure in doing things 0   Feeling down, depressed, or hopeless 0   Trouble falling or staying asleep, or sleeping too much 0   Feeling tired or having little energy 0   Poor appetite or overeating 0   Feeling bad about yourself - or that you are a failure or have let yourself or your family down 0   Trouble concentrating on things, such as reading the newspaper or watching television 0   Moving or speaking so slowly that other people could have noticed. Or the opposite - being so fidgety or restless that you have been moving around a lot more than usual 0   Thoughts that you would be better off dead, or of hurting yourself in some way 0   Total Score 0       Health Habits and Functional and Cognitive Screening:  Functional & Cognitive Status 1/21/2019   Do you have difficulty preparing food and eating? No   Do you have difficulty bathing yourself, getting dressed or grooming yourself? No   Do you have difficulty using the toilet? No    Do you have difficulty moving around from place to place? No   Do you have trouble with steps or getting out of a bed or a chair? No   In the past year have you fallen or experienced a near fall? No   Current Diet Well Balanced Diet   Dental Exam Up to date   Eye Exam Not up to date   Exercise (times per week) 5 times per week   Current Exercise Activities Include Cardiovasular Workout on Exercise Equipment   Do you need help using the phone?  No   Are you deaf or do you have serious difficulty hearing?  No   Do you need help with transportation? No   Do you need help shopping? No   Do you need help preparing meals?  No   Do you need help with housework?  No   Do you need help with laundry? No   Do you need help taking your medications? No   Do you need help managing money? No   Do you ever drive or ride in a car without wearing a seat belt? No   Have you felt unusual stress, anger or loneliness in the last month? No   Who do you live with? Spouse   If you need help, do you have trouble finding someone available to you? No   Have you been bothered in the last four weeks by sexual problems? No   Do you have difficulty concentrating, remembering or making decisions? No           Does the patient have evidence of cognitive impairment? No    Asiprin use counseling: Does not need ASA (and currently is not on it)      Recent Lab Results:    Visual Acuity:  No exam data present    Age-appropriate Screening Schedule:  Refer to the list below for future screening recommendations based on patient's age, sex and/or medical conditions. Orders for these recommended tests are listed in the plan section. The patient has been provided with a written plan.    Health Maintenance   Topic Date Due   • ZOSTER VACCINE (2 of 3) 10/29/2013   • LIPID PANEL  06/20/2019   • MAMMOGRAM  07/10/2020   • COLONOSCOPY  07/16/2020   • DXA SCAN  10/24/2020   • TDAP/TD VACCINES (2 - Td) 08/01/2023   • INFLUENZA VACCINE  Completed   • PNEUMOCOCCAL  VACCINES (65+ LOW/MEDIUM RISK)  Completed        Subjective   History of Present Illness    Mag Palomino is a 68 y.o. female who presents for an Annual Wellness Visit.    The following portions of the patient's history were reviewed and updated as appropriate: allergies, current medications, past family history, past medical history, past social history, past surgical history and problem list.    Outpatient Medications Prior to Visit   Medication Sig Dispense Refill   • acetaminophen (TYLENOL) 325 MG tablet Take 650 mg by mouth Every 6 (Six) Hours As Needed for mild pain (1-3).     • atorvastatin (LIPITOR) 20 MG tablet TAKE ONE TABLET BY MOUTH DAILY 90 tablet 1   • cetirizine (ZyrTEC) 10 MG tablet Take 10 mg by mouth daily.     • docusate sodium (COLACE) 100 MG capsule Take 100 mg by mouth as needed for constipation.     • Multiple Vitamin (MULTIVITAMINS PO) Take 1 tablet by mouth Daily.     • Omega-3 Fatty Acids (FISH OIL) 1000 MG capsule capsule Take 1,000 mg by mouth Daily With Breakfast.     • spironolactone (ALDACTONE) 100 MG tablet Take 1 tablet by mouth daily.       No facility-administered medications prior to visit.        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 Have asked the patient to send this to us to add to record.    Identification of Risk Factors:  Risk factors include: none.    Review of Systems    Compared to one year ago, the patient feels her physical health is the same.  Compared to one year ago, the patient feels her mental health is the same.    Objective     Physical Exam    Vitals:    01/21/19 0901   BP: 142/88   BP Location: Left arm  "  Patient Position: Sitting   Cuff Size: Adult   Pulse: 73   Temp: 97.7 °F (36.5 °C)   TempSrc: Temporal   SpO2: 98%   Weight: 57.2 kg (126 lb 3.2 oz)   Height: 160 cm (63\")   PainSc: 0-No pain       Patient's Body mass index is 22.36 kg/m². BMI is within normal parameters. No follow-up required..      Assessment/Plan   Patient Self-Management and Personalized Health Advice  The patient has been provided with information about: diet, exercise, weight management, prevention of cardiac or vascular disease, the relationship between weight and GERD and fall prevention and preventive services including:   · Advance directive, Exercise counseling provided, Fall Risk assessment done, Fall Risk plan of care done, Nutrition counseling provided, Screening for AAA, referral for ultrasound placed, Zostavax vaccine (Herpes Zoster).    Visit Diagnoses:  No diagnosis found.    No orders of the defined types were placed in this encounter.      Outpatient Encounter Medications as of 1/21/2019   Medication Sig Dispense Refill   • acetaminophen (TYLENOL) 325 MG tablet Take 650 mg by mouth Every 6 (Six) Hours As Needed for mild pain (1-3).     • atorvastatin (LIPITOR) 20 MG tablet TAKE ONE TABLET BY MOUTH DAILY 90 tablet 1   • cetirizine (ZyrTEC) 10 MG tablet Take 10 mg by mouth daily.     • docusate sodium (COLACE) 100 MG capsule Take 100 mg by mouth as needed for constipation.     • Multiple Vitamin (MULTIVITAMINS PO) Take 1 tablet by mouth Daily.     • Omega-3 Fatty Acids (FISH OIL) 1000 MG capsule capsule Take 1,000 mg by mouth Daily With Breakfast.     • spironolactone (ALDACTONE) 100 MG tablet Take 1 tablet by mouth daily.       No facility-administered encounter medications on file as of 1/21/2019.        Reviewed use of high risk medication in the elderly: not applicable  Reviewed for potential of harmful drug interactions in the elderly: not applicable    Follow Up:  No Follow-up on file.     An After Visit Summary and PPPS " with all of these plans were given to the patient.

## 2019-01-21 NOTE — PATIENT INSTRUCTIONS
Medicare Wellness  Personal Prevention Plan of Service     Date of Office Visit:  2019  Encounter Provider:  Purvi Martinez MD  Place of Service:  [facility]  Patient Name: Mag Palomino  :  1950    As part of the Medicare Wellness portion of your visit today, we are providing you with this personalized preventive plan of services (PPPS). This plan is based upon recommendations of the [place] Preventive Services Task Force (USPSTF) and the Advisory Committee on Immunization Practices (ACIP).    This lists the preventive care services that should be considered, and provides dates of when you are due. Items listed as completed are up-to-date and do not require any further intervention.    Health Maintenance   Topic Date Due   • ZOSTER VACCINE (2 of 3) 10/29/2013   • COLONOSCOPY  2016   • LIPID PANEL  2019   • MAMMOGRAM  07/10/2019   • MEDICARE ANNUAL WELLNESS  2020   • DXA SCAN  10/24/2020   • TDAP/TD VACCINES (2 - Td) 2023   • HEPATITIS C SCREENING  Completed   • INFLUENZA VACCINE  Completed   • PNEUMOCOCCAL VACCINES (65+ LOW/MEDIUM RISK)  Completed       No orders of the defined types were placed in this encounter.      No Follow-up on file.

## 2019-01-21 NOTE — PROGRESS NOTES
"Subjective   Mag Palomino is a 68 y.o. female here for   Chief Complaint   Patient presents with   • Medicare Wellness-Initial Visit   • Hyperlipidemia   .    Vitals:    01/21/19 0901 01/21/19 0934   BP: 142/88 140/90   BP Location: Left arm    Patient Position: Sitting    Cuff Size: Adult    Pulse: 73    Resp: 16    Temp: 97.7 °F (36.5 °C)    TempSrc: Temporal    SpO2: 98%    Weight: 57.2 kg (126 lb 3.2 oz)    Height: 160 cm (63\")        Body mass index is 22.36 kg/m².    Hyperlipidemia   This is a chronic problem. The current episode started more than 1 year ago. The problem is controlled. Recent lipid tests were reviewed and are normal. She has no history of diabetes or obesity.        The following portions of the patient's history were reviewed and updated as appropriate: allergies, current medications, past social history and problem list.    Review of Systems   Constitutional: Positive for fatigue (mild). Negative for chills and fever.   Respiratory: Negative for cough and wheezing.    Cardiovascular: Negative for leg swelling.   Psychiatric/Behavioral: Negative for dysphoric mood and sleep disturbance. The patient is not nervous/anxious.        Objective   Physical Exam   Constitutional: She appears well-developed and well-nourished. No distress.   Cardiovascular: Normal rate, regular rhythm and normal heart sounds.   Pulmonary/Chest: No respiratory distress. She has no wheezes. She has no rales. She exhibits no tenderness.   Musculoskeletal: She exhibits no edema.   Psychiatric: She has a normal mood and affect. Her behavior is normal.   Nursing note and vitals reviewed.      Assessment/Plan   Diagnoses and all orders for this visit:    Mixed hyperlipidemia  Comments:  continue lipitor, diet & exercise  Orders:  -     atorvastatin (LIPITOR) 20 MG tablet; Take 1 tablet by mouth Daily.  -     Lipid Panel; Future  -     Comprehensive Metabolic Panel; Future    Seasonal allergic rhinitis, unspecified " trigger    History of colonic polyps  Comments:  c-scope by dr richey 2015 - I need reports    Chronic fatigue  Comments:  mild - call if worse  Orders:  -     TSH Rfx On Abnormal To Free T4; Future    Hair loss  Comments:  ok with aldactone       Wellness today.    Need daily strengthening & balance exercises (shown today).   Need screening for AAA & carotid disease.  Information given today.        She gets breast exam & mammo with dr aggarwal yearly.

## 2019-06-10 ENCOUNTER — TRANSCRIBE ORDERS (OUTPATIENT)
Dept: ADMINISTRATIVE | Facility: HOSPITAL | Age: 69
End: 2019-06-10

## 2019-06-10 DIAGNOSIS — Z12.31 VISIT FOR SCREENING MAMMOGRAM: Primary | ICD-10-CM

## 2019-07-12 ENCOUNTER — HOSPITAL ENCOUNTER (OUTPATIENT)
Dept: MAMMOGRAPHY | Facility: HOSPITAL | Age: 69
Discharge: HOME | End: 2019-07-12
Admitting: INTERNAL MEDICINE

## 2019-07-12 DIAGNOSIS — Z12.31 VISIT FOR SCREENING MAMMOGRAM: ICD-10-CM

## 2019-07-12 PROCEDURE — 77063 BREAST TOMOSYNTHESIS BI: CPT

## 2019-07-12 PROCEDURE — 77067 SCR MAMMO BI INCL CAD: CPT

## 2019-08-06 ENCOUNTER — OFFICE VISIT (OUTPATIENT)
Dept: INTERNAL MEDICINE | Facility: CLINIC | Age: 69
End: 2019-08-06

## 2019-08-06 VITALS
DIASTOLIC BLOOD PRESSURE: 80 MMHG | BODY MASS INDEX: 21.93 KG/M2 | WEIGHT: 123.8 LBS | HEIGHT: 63 IN | SYSTOLIC BLOOD PRESSURE: 120 MMHG

## 2019-08-06 DIAGNOSIS — R12 HEARTBURN: ICD-10-CM

## 2019-08-06 DIAGNOSIS — R53.82 CHRONIC FATIGUE: ICD-10-CM

## 2019-08-06 DIAGNOSIS — R03.0 BORDERLINE HIGH BLOOD PRESSURE: ICD-10-CM

## 2019-08-06 DIAGNOSIS — E78.5 HYPERLIPIDEMIA, UNSPECIFIED HYPERLIPIDEMIA TYPE: Primary | Chronic | ICD-10-CM

## 2019-08-06 LAB
ALBUMIN SERPL-MCNC: 4.9 G/DL (ref 3.5–5.2)
ALBUMIN/GLOB SERPL: 1.9 G/DL
ALP SERPL-CCNC: 47 U/L (ref 39–117)
ALT SERPL W P-5'-P-CCNC: 22 U/L (ref 1–33)
ANION GAP SERPL CALCULATED.3IONS-SCNC: 12.3 MMOL/L (ref 5–15)
AST SERPL-CCNC: 28 U/L (ref 1–32)
BILIRUB SERPL-MCNC: 0.5 MG/DL (ref 0.2–1.2)
BUN BLD-MCNC: 16 MG/DL (ref 8–23)
BUN/CREAT SERPL: 23.5 (ref 7–25)
CALCIUM SPEC-SCNC: 9.7 MG/DL (ref 8.6–10.5)
CHLORIDE SERPL-SCNC: 105 MMOL/L (ref 98–107)
CHOLEST SERPL-MCNC: 138 MG/DL (ref 0–200)
CO2 SERPL-SCNC: 27.7 MMOL/L (ref 22–29)
CREAT BLD-MCNC: 0.68 MG/DL (ref 0.57–1)
GFR SERPL CREATININE-BSD FRML MDRD: 86 ML/MIN/1.73
GLOBULIN UR ELPH-MCNC: 2.6 GM/DL
GLUCOSE BLD-MCNC: 97 MG/DL (ref 65–99)
HDLC SERPL-MCNC: 74 MG/DL (ref 40–60)
LDLC SERPL CALC-MCNC: 53 MG/DL (ref 0–100)
LDLC/HDLC SERPL: 0.71 {RATIO}
POTASSIUM BLD-SCNC: 4.1 MMOL/L (ref 3.5–5.2)
PROT SERPL-MCNC: 7.5 G/DL (ref 6–8.5)
SODIUM BLD-SCNC: 145 MMOL/L (ref 136–145)
TRIGL SERPL-MCNC: 57 MG/DL (ref 0–150)
VLDLC SERPL-MCNC: 11.4 MG/DL (ref 5–40)

## 2019-08-06 PROCEDURE — 80053 COMPREHEN METABOLIC PANEL: CPT | Performed by: INTERNAL MEDICINE

## 2019-08-06 PROCEDURE — 99213 OFFICE O/P EST LOW 20 MIN: CPT | Performed by: INTERNAL MEDICINE

## 2019-08-06 PROCEDURE — 80061 LIPID PANEL: CPT | Performed by: INTERNAL MEDICINE

## 2019-08-06 RX ORDER — LANSOPRAZOLE 15 MG/1
15 CAPSULE, DELAYED RELEASE ORAL AS NEEDED
COMMUNITY

## 2019-08-06 NOTE — PROGRESS NOTES
"Subjective   Mag Palomino is a 69 y.o. female here for   Chief Complaint   Patient presents with   • Hyperlipidemia     6 month follow-up   .    Vitals:    08/06/19 0921 08/06/19 0934   BP: 128/90 120/80   BP Location: Left arm    Patient Position: Sitting    Cuff Size: Adult    Weight: 56.2 kg (123 lb 12.8 oz)    Height: 160 cm (63\")        Body mass index is 21.93 kg/m².    Hyperlipidemia   This is a chronic problem. The current episode started more than 1 year ago. Recent lipid tests were reviewed and are normal. She has no history of diabetes. Pertinent negatives include no chest pain or shortness of breath.        The following portions of the patient's history were reviewed and updated as appropriate: allergies, current medications, past social history and problem list.    Review of Systems   Constitutional: Positive for fatigue (mild). Negative for chills and fever.   Respiratory: Negative for cough, shortness of breath and wheezing.    Cardiovascular: Negative for chest pain, palpitations and leg swelling.   Psychiatric/Behavioral: Negative for dysphoric mood and sleep disturbance. The patient is not nervous/anxious.        Objective   Physical Exam   Constitutional: She appears well-developed and well-nourished. No distress.   Cardiovascular: Normal rate, regular rhythm and normal heart sounds.   Pulmonary/Chest: No respiratory distress. She has no wheezes. She has no rales. She exhibits no tenderness.   Musculoskeletal: She exhibits no edema.   Psychiatric: She has a normal mood and affect. Her behavior is normal.   Nursing note and vitals reviewed.      Assessment/Plan   Diagnoses and all orders for this visit:    Hyperlipidemia, unspecified hyperlipidemia type  Comments:  continue diet/ex/statin  Orders:  -     Comprehensive Metabolic Panel; Future  -     Lipid Panel; Future    Chronic fatigue  Comments:  mild - call if worse  Orders:  -     TSH Rfx On Abnormal To Free T4; Future    Borderline high " blood pressure  Comments:  always normal at home - call if bp over 140/90    Heartburn  -     lansoprazole (PREVACID) 15 MG capsule; Take 15 mg by mouth Daily.

## 2019-08-07 LAB — TSH SERPL-ACNC: 2.16 MIU/ML (ref 0.27–4.2)

## 2019-09-13 DIAGNOSIS — E78.2 MIXED HYPERLIPIDEMIA: ICD-10-CM

## 2019-09-13 RX ORDER — ATORVASTATIN CALCIUM 20 MG/1
TABLET, FILM COATED ORAL
Qty: 90 TABLET | Refills: 1 | Status: SHIPPED | OUTPATIENT
Start: 2019-09-13 | End: 2020-02-10 | Stop reason: SDUPTHER

## 2019-11-04 ENCOUNTER — LAB (OUTPATIENT)
Dept: LAB | Facility: HOSPITAL | Age: 69
End: 2019-11-04

## 2019-11-04 ENCOUNTER — OFFICE VISIT (OUTPATIENT)
Dept: ONCOLOGY | Facility: CLINIC | Age: 69
End: 2019-11-04

## 2019-11-04 VITALS
HEART RATE: 77 BPM | DIASTOLIC BLOOD PRESSURE: 77 MMHG | SYSTOLIC BLOOD PRESSURE: 146 MMHG | RESPIRATION RATE: 16 BRPM | OXYGEN SATURATION: 99 % | WEIGHT: 123 LBS | HEIGHT: 64 IN | TEMPERATURE: 98.6 F | BODY MASS INDEX: 21 KG/M2

## 2019-11-04 DIAGNOSIS — D05.02 NEOPLASM OF LEFT BREAST, PRIMARY TUMOR STAGING CATEGORY TIS: LOBULAR CARCINOMA IN SITU (LCIS): Primary | ICD-10-CM

## 2019-11-04 LAB
ALBUMIN SERPL-MCNC: 4.9 G/DL (ref 3.5–5.2)
ALBUMIN/GLOB SERPL: 1.9 G/DL (ref 1.1–2.4)
ALP SERPL-CCNC: 54 U/L (ref 38–116)
ALT SERPL W P-5'-P-CCNC: 37 U/L (ref 0–33)
ANION GAP SERPL CALCULATED.3IONS-SCNC: 12.6 MMOL/L (ref 5–15)
AST SERPL-CCNC: 34 U/L (ref 0–32)
BASOPHILS # BLD AUTO: 0.03 10*3/MM3 (ref 0–0.2)
BASOPHILS NFR BLD AUTO: 0.5 % (ref 0–1.5)
BILIRUB SERPL-MCNC: 0.7 MG/DL (ref 0.2–1.2)
BUN BLD-MCNC: 19 MG/DL (ref 6–20)
BUN/CREAT SERPL: 27.1 (ref 7.3–30)
CALCIUM SPEC-SCNC: 10.2 MG/DL (ref 8.5–10.2)
CHLORIDE SERPL-SCNC: 102 MMOL/L (ref 98–107)
CO2 SERPL-SCNC: 28.4 MMOL/L (ref 22–29)
CREAT BLD-MCNC: 0.7 MG/DL (ref 0.6–1.1)
DEPRECATED RDW RBC AUTO: 44 FL (ref 37–54)
EOSINOPHIL # BLD AUTO: 0.05 10*3/MM3 (ref 0–0.4)
EOSINOPHIL NFR BLD AUTO: 0.9 % (ref 0.3–6.2)
ERYTHROCYTE [DISTWIDTH] IN BLOOD BY AUTOMATED COUNT: 12.8 % (ref 12.3–15.4)
GFR SERPL CREATININE-BSD FRML MDRD: 83 ML/MIN/1.73
GLOBULIN UR ELPH-MCNC: 2.6 GM/DL (ref 1.8–3.5)
GLUCOSE BLD-MCNC: 98 MG/DL (ref 74–124)
HCT VFR BLD AUTO: 45.8 % (ref 34–46.6)
HGB BLD-MCNC: 14.6 G/DL (ref 12–15.9)
IMM GRANULOCYTES # BLD AUTO: 0 10*3/MM3 (ref 0–0.05)
IMM GRANULOCYTES NFR BLD AUTO: 0 % (ref 0–0.5)
LYMPHOCYTES # BLD AUTO: 1.59 10*3/MM3 (ref 0.7–3.1)
LYMPHOCYTES NFR BLD AUTO: 28.6 % (ref 19.6–45.3)
MCH RBC QN AUTO: 29.7 PG (ref 26.6–33)
MCHC RBC AUTO-ENTMCNC: 31.9 G/DL (ref 31.5–35.7)
MCV RBC AUTO: 93.3 FL (ref 79–97)
MONOCYTES # BLD AUTO: 0.44 10*3/MM3 (ref 0.1–0.9)
MONOCYTES NFR BLD AUTO: 7.9 % (ref 5–12)
NEUTROPHILS # BLD AUTO: 3.45 10*3/MM3 (ref 1.7–7)
NEUTROPHILS NFR BLD AUTO: 62.1 % (ref 42.7–76)
NRBC BLD AUTO-RTO: 0 /100 WBC (ref 0–0.2)
PLATELET # BLD AUTO: 229 10*3/MM3 (ref 140–450)
PMV BLD AUTO: 9.2 FL (ref 6–12)
POTASSIUM BLD-SCNC: 4.6 MMOL/L (ref 3.5–4.7)
PROT SERPL-MCNC: 7.5 G/DL (ref 6.3–8)
RBC # BLD AUTO: 4.91 10*6/MM3 (ref 3.77–5.28)
SODIUM BLD-SCNC: 143 MMOL/L (ref 134–145)
WBC NRBC COR # BLD: 5.56 10*3/MM3 (ref 3.4–10.8)

## 2019-11-04 PROCEDURE — G0463 HOSPITAL OUTPT CLINIC VISIT: HCPCS | Performed by: INTERNAL MEDICINE

## 2019-11-04 PROCEDURE — 36415 COLL VENOUS BLD VENIPUNCTURE: CPT

## 2019-11-04 PROCEDURE — 99213 OFFICE O/P EST LOW 20 MIN: CPT | Performed by: INTERNAL MEDICINE

## 2019-11-04 PROCEDURE — 80053 COMPREHEN METABOLIC PANEL: CPT

## 2019-11-04 PROCEDURE — 85025 COMPLETE CBC W/AUTO DIFF WBC: CPT

## 2019-11-04 NOTE — PROGRESS NOTES
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 She had lumpectomy and took 5 years of tamoxifen as breast cancer prevention. She finished her tamoxifen over 10 years ago . She is here today for an annual followup. Her mammogram was performed 7/12/2019 with no suspicious findings.    It is been recommended that due to her history and the fact that she has dense breasts that she undergo 3-D mammogram yearly.    She is up to date on her colonoscopies.  She had normal bone density on DEXA scan from October 2018.  History of Present Illness     Past Medical History, Past Surgical History, Social History, Family History have been reviewed and are without significant changes except as mentioned.    Review of Systems   Constitutional: Negative for activity change, appetite change, fatigue, fever and unexpected weight change.   HENT: Negative for hearing loss, nosebleeds, trouble swallowing and voice change.    Eyes: Negative for visual disturbance.   Respiratory: Negative for cough, shortness of breath and wheezing.    Cardiovascular: Negative for chest pain and palpitations.   Gastrointestinal: Negative for abdominal pain, diarrhea, nausea and vomiting.   Genitourinary: Negative for difficulty urinating, frequency, hematuria and urgency.   Musculoskeletal: Negative for back pain and neck pain.   Skin: Negative for rash.   Neurological: Negative for dizziness, seizures, syncope and headaches.   Hematological: Negative for adenopathy. Does not bruise/bleed easily.   Psychiatric/Behavioral: Negative for behavioral problems. The patient is not nervous/anxious.       A comprehensive 14 point review of systems was performed and was negative except as mentioned.    Medications:  The current medication list was reviewed in the  "[facility]    ALLERGIES:  No Known Allergies    Objective      Vitals:    11/04/19 1036   BP: 146/77   Pulse: 77   Resp: 16   Temp: 98.6 °F (37 °C)   TempSrc: Oral   SpO2: 99%   Weight: 55.8 kg (123 lb)   Height: 163 cm (64.17\")  Comment: new ht   PainSc: 0-No pain     Current Status 11/4/2019   ECOG score 0       Physical Exam   Constitutional: She is oriented to person, place, and time. She appears well-developed and well-nourished. No distress.   HENT:   Head: Normocephalic.   Eyes: EOM are normal. Pupils are equal, round, and reactive to light.   Neck: Neck supple. No JVD present. No thyromegaly present.   Cardiovascular: Normal rate and regular rhythm. Exam reveals no gallop and no friction rub.   No murmur heard.  Pulmonary/Chest: Effort normal and breath sounds normal. She has no wheezes. She has no rales. Right breast exhibits no mass, no nipple discharge and no skin change. Left breast exhibits no mass, no nipple discharge and no skin change.   Abdominal: Soft. Bowel sounds are normal. She exhibits no distension and no mass. There is no tenderness.   Musculoskeletal: She exhibits no edema or deformity.   Neurological: She is alert and oriented to person, place, and time. She has normal reflexes. No cranial nerve deficit.   Skin: Skin is dry. No rash noted.   Psychiatric: She has a normal mood and affect. Judgment normal.        RECENT LABS:  Hematology WBC   Date Value Ref Range Status   11/04/2019 5.56 3.40 - 10.80 10*3/mm3 Final     RBC   Date Value Ref Range Status   11/04/2019 4.91 3.77 - 5.28 10*6/mm3 Final     Hemoglobin   Date Value Ref Range Status   11/04/2019 14.6 12.0 - 15.9 g/dL Final     Hematocrit   Date Value Ref Range Status   11/04/2019 45.8 34.0 - 46.6 % Final     Platelets   Date Value Ref Range Status   11/04/2019 229 140 - 450 10*3/mm3 Final          SCREENING MAMMOGRAM WITH R2 COMPUTERIZED ASSISTED DETECTION AND DIGITAL  TOMOSYNTHESIS 7/12/2019  FINDINGS: There are scattered fibroglandular " densities. No dominant  masses, microcalcifications, skin changes or architectural distortion is  seen. Bilateral scar markers are seen. Benign-appearing calcifications  are seen.     Bilateral breast tomosynthesis images show no other significant  findings.     IMPRESSION:  No radiographic evidence of malignancy.     BI-RADS CATEGORY 2: Benign.       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 She continues to do well with no evidence of malignancy on exam or on her mammograms.     PLAN:   1. The patient will return to see us again in one year for routine followup.   She will be due for annual screening mammogram again in July 2020 11/4/2019      CC:

## 2020-02-10 ENCOUNTER — OFFICE VISIT (OUTPATIENT)
Dept: INTERNAL MEDICINE | Facility: CLINIC | Age: 70
End: 2020-02-10

## 2020-02-10 VITALS
BODY MASS INDEX: 22.01 KG/M2 | HEIGHT: 63 IN | RESPIRATION RATE: 15 BRPM | DIASTOLIC BLOOD PRESSURE: 80 MMHG | SYSTOLIC BLOOD PRESSURE: 124 MMHG | HEART RATE: 88 BPM | WEIGHT: 124.2 LBS | OXYGEN SATURATION: 99 % | TEMPERATURE: 97.7 F

## 2020-02-10 DIAGNOSIS — E78.2 MIXED HYPERLIPIDEMIA: ICD-10-CM

## 2020-02-10 DIAGNOSIS — R03.0 BORDERLINE HIGH BLOOD PRESSURE: ICD-10-CM

## 2020-02-10 DIAGNOSIS — Z86.010 HISTORY OF COLONIC POLYPS: Chronic | ICD-10-CM

## 2020-02-10 DIAGNOSIS — Z00.00 MEDICARE ANNUAL WELLNESS VISIT, SUBSEQUENT: Primary | ICD-10-CM

## 2020-02-10 LAB
ALBUMIN SERPL-MCNC: 4.9 G/DL (ref 3.5–5.2)
ALBUMIN/GLOB SERPL: 2.3 G/DL
ALP SERPL-CCNC: 56 U/L (ref 39–117)
ALT SERPL-CCNC: 31 U/L (ref 1–33)
AST SERPL-CCNC: 35 U/L (ref 1–32)
BILIRUB SERPL-MCNC: 0.5 MG/DL (ref 0.2–1.2)
BUN SERPL-MCNC: 20 MG/DL (ref 8–23)
BUN/CREAT SERPL: 24.4 (ref 7–25)
CALCIUM SERPL-MCNC: 9.8 MG/DL (ref 8.6–10.5)
CHLORIDE SERPL-SCNC: 100 MMOL/L (ref 98–107)
CHOLEST SERPL-MCNC: 166 MG/DL (ref 0–200)
CO2 SERPL-SCNC: 27.4 MMOL/L (ref 22–29)
CREAT SERPL-MCNC: 0.82 MG/DL (ref 0.57–1)
GLOBULIN SER CALC-MCNC: 2.1 GM/DL
GLUCOSE SERPL-MCNC: 90 MG/DL (ref 65–99)
HDLC SERPL-MCNC: 74 MG/DL (ref 40–60)
LDLC SERPL CALC-MCNC: 76 MG/DL (ref 0–100)
POTASSIUM SERPL-SCNC: 4.8 MMOL/L (ref 3.5–5.2)
PROT SERPL-MCNC: 7 G/DL (ref 6–8.5)
SODIUM SERPL-SCNC: 140 MMOL/L (ref 136–145)
TRIGL SERPL-MCNC: 81 MG/DL (ref 0–150)
VLDLC SERPL CALC-MCNC: 16.2 MG/DL

## 2020-02-10 PROCEDURE — G0439 PPPS, SUBSEQ VISIT: HCPCS | Performed by: INTERNAL MEDICINE

## 2020-02-10 RX ORDER — ATORVASTATIN CALCIUM 20 MG/1
20 TABLET, FILM COATED ORAL DAILY
Qty: 90 TABLET | Refills: 3 | Status: SHIPPED | OUTPATIENT
Start: 2020-02-10 | End: 2021-02-22

## 2020-02-10 NOTE — PROGRESS NOTES
The ABCs of the Annual Wellness Visit  Subsequent Medicare Wellness Visit    Chief Complaint   Patient presents with   • Medicare Wellness-subsequent       Subjective   History of Present Illness:  Mag Palomino is a 69 y.o. female who presents for a Subsequent Medicare Wellness Visit.    HEALTH RISK ASSESSMENT    Recent Hospitalizations:  No hospitalization(s) within the last year.    Current Medical Providers:  Patient Care Team:  Purvi Martinez MD as PCP - General  Yessy Hui MD as PCP - Claims Attributed  Michael James MD as Consulting Physician (Hematology and Oncology)  Yessy Hui MD as Consulting Physician (Dermatology)  Yifan Salmeron MD as Consulting Physician (Otolaryngology)  Jose De La Torre MD as Consulting Physician (Cardiology)  Purvi Martinez MD as Referring Physician (Internal Medicine)  Mag York MD as Surgeon (General Surgery)    Smoking Status:  Social History     Tobacco Use   Smoking Status Never Smoker   Smokeless Tobacco Never Used       Alcohol Consumption:  Social History     Substance and Sexual Activity   Alcohol Use Yes   • Alcohol/week: 1.0 - 2.0 standard drinks   • Types: 1 - 2 Glasses of wine per week    Comment: social       Depression Screen:   PHQ-2/PHQ-9 Depression Screening 2/10/2020   Little interest or pleasure in doing things 0   Feeling down, depressed, or hopeless 0   Trouble falling or staying asleep, or sleeping too much 0   Feeling tired or having little energy 0   Poor appetite or overeating 0   Feeling bad about yourself - or that you are a failure or have let yourself or your family down 0   Trouble concentrating on things, such as reading the newspaper or watching television 0   Moving or speaking so slowly that other people could have noticed. Or the opposite - being so fidgety or restless that you have been moving around a lot more than usual 0   Thoughts that you would be better off dead, or of hurting yourself in  some way 0   Total Score 0       Fall Risk Screen:  FREDERICK Fall Risk Assessment was completed, and patient is at LOW risk for falls.Assessment completed on:2/10/2020    Health Habits and Functional and Cognitive Screening:  Functional & Cognitive Status 2/10/2020   Do you have difficulty preparing food and eating? No   Do you have difficulty bathing yourself, getting dressed or grooming yourself? No   Do you have difficulty using the toilet? No   Do you have difficulty moving around from place to place? No   Do you have trouble with steps or getting out of a bed or a chair? No   Current Diet Well Balanced Diet   Dental Exam Up to date   Eye Exam Up to date   Exercise (times per week) 4 times per week   Current Exercise Activities Include Cardiovasular Workout on Exercise Equipment   Do you need help using the phone?  No   Are you deaf or do you have serious difficulty hearing?  No   Do you need help with transportation? No   Do you need help shopping? No   Do you need help preparing meals?  No   Do you need help with housework?  No   Do you need help with laundry? No   Do you need help taking your medications? No   Do you need help managing money? No   Do you ever drive or ride in a car without wearing a seat belt? No   Have you felt unusual stress, anger or loneliness in the last month? No   Who do you live with? Spouse   If you need help, do you have trouble finding someone available to you? No   Have you been bothered in the last four weeks by sexual problems? No   Do you have difficulty concentrating, remembering or making decisions? No         Does the patient have evidence of cognitive impairment? No    Asprin use counseling:Does not need ASA (and currently is not on it)    Age-appropriate Screening Schedule:  Refer to the list below for future screening recommendations based on patient's age, sex and/or medical conditions. Orders for these recommended tests are listed in the plan section. The patient has been  provided with a written plan.    Health Maintenance   Topic Date Due   • MAMMOGRAM  07/12/2020   • LIPID PANEL  08/06/2020   • DXA SCAN  10/24/2020   • COLONOSCOPY  12/14/2020   • TDAP/TD VACCINES (2 - Td) 08/01/2023   • INFLUENZA VACCINE  Completed   • ZOSTER VACCINE  Completed          The following portions of the patient's history were reviewed and updated as appropriate: allergies, current medications, past family history, past medical history, past social history, past surgical history and problem list.    Outpatient Medications Prior to Visit   Medication Sig Dispense Refill   • acetaminophen (TYLENOL) 325 MG tablet Take 650 mg by mouth Every 6 (Six) Hours As Needed for mild pain (1-3).     • cetirizine (ZyrTEC) 10 MG tablet Take 10 mg by mouth daily.     • docusate sodium (COLACE) 100 MG capsule Take 100 mg by mouth as needed for constipation.     • lansoprazole (PREVACID) 15 MG capsule Take 15 mg by mouth Daily.     • Multiple Vitamin (MULTIVITAMINS PO) Take 1 tablet by mouth Daily.     • Omega-3 Fatty Acids (FISH OIL) 1000 MG capsule capsule Take 1,000 mg by mouth Daily With Breakfast.     • spironolactone (ALDACTONE) 100 MG tablet Take 1 tablet by mouth daily.     • atorvastatin (LIPITOR) 20 MG tablet TAKE ONE TABLET BY MOUTH DAILY 90 tablet 1     No facility-administered medications prior to visit.        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 Patient has an advance directive, copy requested.    Review of Systems    Compared to one year ago, the patient feels her physical health is the  "same.  Compared to one year ago, the patient feels her mental health is the same.    Reviewed chart for potential of high risk medication in the elderly: not applicable  Reviewed chart for potential of harmful drug interactions in the elderly:not applicable    Objective         Vitals:    02/10/20 1009   BP: 124/80   BP Location: Left arm   Patient Position: Sitting   Cuff Size: Adult   Pulse: 88   Resp: 15   Temp: 97.7 °F (36.5 °C)   TempSrc: Temporal   SpO2: 99%   Weight: 56.3 kg (124 lb 3.2 oz)   Height: 159.1 cm (62.65\")  Comment: Upadated Height   PainSc: 0-No pain       Body mass index is 22.25 kg/m².  Discussed the patient's BMI with her. The BMI is in the acceptable range.    Physical Exam          Assessment/Plan   Medicare Risks and Personalized Health Plan  CMS Preventative Services Quick Reference  Abdominal Aortic Aneurysm Screening  Advance Directive Discussion  Cardiovascular risk  Colon Cancer Screening  Fall Risk  Osteoprorosis Risk    The above risks/problems have been discussed with the patient.  Pertinent information has been shared with the patient in the After Visit Summary.  Follow up plans and orders are seen below in the Assessment/Plan Section.    Diagnoses and all orders for this visit:    1. Medicare annual wellness visit, subsequent (Primary)    2. Borderline high blood pressure  Comments:  call if bp over 140/90  Orders:  -     Comprehensive Metabolic Panel  -     Lipid Panel    3. Mixed hyperlipidemia  Comments:  continue lipitor, diet & exercise  Orders:  -     atorvastatin (LIPITOR) 20 MG tablet; Take 1 tablet by mouth Daily.  Dispense: 90 tablet; Refill: 3    4. History of colonic polyps  Comments:  need c-scope by 12/2020  Orders:  -     Ambulatory Referral to General Surgery      Follow Up:  Return in about 6 months (around 8/10/2020) for Recheck.     An After Visit Summary and PPPS were given to the patient.        Need daily strengthening & balance exercises (shown " today).   Need screening for AAA & carotid disease.  Information given today.     She is due for c-scope this year.     She needs 2nd hep A.      Answers for HPI/ROS submitted by the patient on 2/9/2020   What is the primary reason for your visit?: Physical

## 2020-02-10 NOTE — PATIENT INSTRUCTIONS
Medicare Wellness  Personal Prevention Plan of Service     Date of Office Visit:  02/10/2020  Encounter Provider:  Purvi Martinez MD  Place of Service:  [facility] INTERNAL MEDIC[facility]  Patient Name: Mag Palomino  :  1950    As part of the Medicare Wellness portion of your visit today, we are providing you with this personalized preventive plan of services (PPPS). This plan is based upon recommendations of the [place] Preventive Services Task Force (USPSTF) and the Advisory Committee on Immunization Practices (ACIP).    This lists the preventive care services that should be considered, and provides dates of when you are due. Items listed as completed are up-to-date and do not require any further intervention.    Health Maintenance   Topic Date Due   • MAMMOGRAM  2020   • LIPID PANEL  2020   • DXA SCAN  10/24/2020   • COLONOSCOPY  2020   • MEDICARE ANNUAL WELLNESS  02/10/2021   • TDAP/TD VACCINES (2 - Td) 2023   • HEPATITIS C SCREENING  Completed   • Pneumococcal Vaccine Once at 65 Years Old  Completed   • INFLUENZA VACCINE  Completed   • ZOSTER VACCINE  Completed       No orders of the defined types were placed in this encounter.      No follow-ups on file.

## 2020-05-21 ENCOUNTER — TRANSCRIBE ORDERS (OUTPATIENT)
Dept: ADMINISTRATIVE | Facility: HOSPITAL | Age: 70
End: 2020-05-21

## 2020-05-21 DIAGNOSIS — Z12.31 VISIT FOR SCREENING MAMMOGRAM: Primary | ICD-10-CM

## 2020-07-14 ENCOUNTER — HOSPITAL ENCOUNTER (OUTPATIENT)
Dept: MAMMOGRAPHY | Facility: HOSPITAL | Age: 70
Discharge: HOME | End: 2020-07-14
Admitting: INTERNAL MEDICINE

## 2020-07-14 DIAGNOSIS — Z12.31 VISIT FOR SCREENING MAMMOGRAM: ICD-10-CM

## 2020-07-14 PROCEDURE — 77063 BREAST TOMOSYNTHESIS BI: CPT

## 2020-07-14 PROCEDURE — 77067 SCR MAMMO BI INCL CAD: CPT

## 2020-08-10 ENCOUNTER — OFFICE VISIT (OUTPATIENT)
Dept: INTERNAL MEDICINE | Facility: CLINIC | Age: 70
End: 2020-08-10

## 2020-08-10 VITALS
HEART RATE: 80 BPM | SYSTOLIC BLOOD PRESSURE: 114 MMHG | HEIGHT: 63 IN | DIASTOLIC BLOOD PRESSURE: 60 MMHG | WEIGHT: 119 LBS | BODY MASS INDEX: 21.09 KG/M2 | OXYGEN SATURATION: 95 %

## 2020-08-10 DIAGNOSIS — L65.9 HAIR LOSS: ICD-10-CM

## 2020-08-10 DIAGNOSIS — R79.89 ELEVATED LIVER FUNCTION TESTS: ICD-10-CM

## 2020-08-10 DIAGNOSIS — R12 HEARTBURN: ICD-10-CM

## 2020-08-10 DIAGNOSIS — E78.5 HYPERLIPIDEMIA, UNSPECIFIED HYPERLIPIDEMIA TYPE: Primary | Chronic | ICD-10-CM

## 2020-08-10 PROCEDURE — 99213 OFFICE O/P EST LOW 20 MIN: CPT | Performed by: INTERNAL MEDICINE

## 2020-08-10 NOTE — PROGRESS NOTES
"Answers for HPI/ROS submitted by the patient on 8/3/2020   What is the primary reason for your visit?: Physical  Subjective   Mag Palomino is a 70 y.o. female here for   Chief Complaint   Patient presents with   • Hyperlipidemia     6 month follow up   .    Vitals:    08/10/20 0940   BP: 114/60   Pulse: 80   SpO2: 95%   Weight: 54 kg (119 lb)   Height: 159.1 cm (62.65\")       Body mass index is 21.32 kg/m².    Hyperlipidemia   This is a chronic problem. The current episode started more than 1 year ago. The problem is controlled. She has no history of diabetes or obesity. Pertinent negatives include no chest pain or shortness of breath.        The following portions of the patient's history were reviewed and updated as appropriate: allergies, current medications, past social history and problem list.    Review of Systems   Constitutional: Negative for chills, fatigue and fever.   Respiratory: Negative for cough, shortness of breath and wheezing.    Cardiovascular: Negative for chest pain, palpitations and leg swelling.   Psychiatric/Behavioral: Negative for dysphoric mood and sleep disturbance. The patient is not nervous/anxious.        Objective   Physical Exam   Constitutional: She appears well-developed and well-nourished. No distress.   Cardiovascular: Normal rate, regular rhythm and normal heart sounds.   Pulmonary/Chest: No respiratory distress. She has no wheezes. She has no rales. She exhibits no tenderness.   Musculoskeletal: She exhibits no edema.   Psychiatric: She has a normal mood and affect. Her behavior is normal.   Nursing note and vitals reviewed.      Assessment/Plan   Diagnoses and all orders for this visit:    Hyperlipidemia, unspecified hyperlipidemia type  Comments:  continue diet/ex  Orders:  -     Comprehensive Metabolic Panel  -     Lipid Panel    Hair loss    Heartburn  Comments:  ok with occ prevacid    Elevated liver function tests  Comments:  rechk today           "

## 2020-08-11 LAB
ALBUMIN SERPL-MCNC: 5 G/DL (ref 3.5–5.2)
ALBUMIN/GLOB SERPL: 3.3 G/DL
ALP SERPL-CCNC: 46 U/L (ref 39–117)
ALT SERPL-CCNC: 16 U/L (ref 1–33)
AST SERPL-CCNC: 24 U/L (ref 1–32)
BILIRUB SERPL-MCNC: 0.5 MG/DL (ref 0–1.2)
BUN SERPL-MCNC: 17 MG/DL (ref 8–23)
BUN/CREAT SERPL: 23 (ref 7–25)
CALCIUM SERPL-MCNC: 9.5 MG/DL (ref 8.6–10.5)
CHLORIDE SERPL-SCNC: 102 MMOL/L (ref 98–107)
CHOLEST SERPL-MCNC: 153 MG/DL (ref 0–200)
CO2 SERPL-SCNC: 27 MMOL/L (ref 22–29)
CREAT SERPL-MCNC: 0.74 MG/DL (ref 0.57–1)
GLOBULIN SER CALC-MCNC: 1.5 GM/DL
GLUCOSE SERPL-MCNC: 82 MG/DL (ref 65–99)
HDLC SERPL-MCNC: 66 MG/DL (ref 40–60)
LDLC SERPL CALC-MCNC: 68 MG/DL (ref 0–100)
POTASSIUM SERPL-SCNC: 4.6 MMOL/L (ref 3.5–5.2)
PROT SERPL-MCNC: 6.5 G/DL (ref 6–8.5)
SODIUM SERPL-SCNC: 142 MMOL/L (ref 136–145)
TRIGL SERPL-MCNC: 97 MG/DL (ref 0–150)
VLDLC SERPL CALC-MCNC: 19.4 MG/DL

## 2020-10-22 ENCOUNTER — CLINICAL SUPPORT (OUTPATIENT)
Dept: INTERNAL MEDICINE | Facility: CLINIC | Age: 70
End: 2020-10-22

## 2020-10-22 DIAGNOSIS — Z78.0 POST-MENOPAUSAL: Primary | ICD-10-CM

## 2020-10-22 DIAGNOSIS — Z78.0 POST-MENOPAUSAL: ICD-10-CM

## 2020-11-12 ENCOUNTER — LAB (OUTPATIENT)
Dept: LAB | Facility: HOSPITAL | Age: 70
End: 2020-11-12

## 2020-11-12 ENCOUNTER — OFFICE VISIT (OUTPATIENT)
Dept: ONCOLOGY | Facility: CLINIC | Age: 70
End: 2020-11-12

## 2020-11-12 VITALS
TEMPERATURE: 97.5 F | HEART RATE: 77 BPM | DIASTOLIC BLOOD PRESSURE: 77 MMHG | SYSTOLIC BLOOD PRESSURE: 147 MMHG | RESPIRATION RATE: 16 BRPM | BODY MASS INDEX: 21.56 KG/M2 | OXYGEN SATURATION: 99 % | HEIGHT: 63 IN | WEIGHT: 121.7 LBS

## 2020-11-12 DIAGNOSIS — D05.02 NEOPLASM OF LEFT BREAST, PRIMARY TUMOR STAGING CATEGORY TIS: LOBULAR CARCINOMA IN SITU (LCIS): Primary | ICD-10-CM

## 2020-11-12 LAB
ALBUMIN SERPL-MCNC: 4.8 G/DL (ref 3.5–5.2)
ALBUMIN/GLOB SERPL: 2.2 G/DL (ref 1.1–2.4)
ALP SERPL-CCNC: 49 U/L (ref 38–116)
ALT SERPL W P-5'-P-CCNC: 22 U/L (ref 0–33)
ANION GAP SERPL CALCULATED.3IONS-SCNC: 12.2 MMOL/L (ref 5–15)
AST SERPL-CCNC: 31 U/L (ref 0–32)
BASOPHILS # BLD AUTO: 0.03 10*3/MM3 (ref 0–0.2)
BASOPHILS NFR BLD AUTO: 0.5 % (ref 0–1.5)
BILIRUB SERPL-MCNC: 0.4 MG/DL (ref 0.2–1.2)
BUN SERPL-MCNC: 17 MG/DL (ref 6–20)
BUN/CREAT SERPL: 24.6 (ref 7.3–30)
CALCIUM SPEC-SCNC: 10 MG/DL (ref 8.5–10.2)
CHLORIDE SERPL-SCNC: 101 MMOL/L (ref 98–107)
CO2 SERPL-SCNC: 26.8 MMOL/L (ref 22–29)
CREAT SERPL-MCNC: 0.69 MG/DL (ref 0.6–1.1)
DEPRECATED RDW RBC AUTO: 43.8 FL (ref 37–54)
EOSINOPHIL # BLD AUTO: 0.08 10*3/MM3 (ref 0–0.4)
EOSINOPHIL NFR BLD AUTO: 1.4 % (ref 0.3–6.2)
ERYTHROCYTE [DISTWIDTH] IN BLOOD BY AUTOMATED COUNT: 13.2 % (ref 12.3–15.4)
GFR SERPL CREATININE-BSD FRML MDRD: 84 ML/MIN/1.73
GLOBULIN UR ELPH-MCNC: 2.2 GM/DL (ref 1.8–3.5)
GLUCOSE SERPL-MCNC: 101 MG/DL (ref 74–124)
HCT VFR BLD AUTO: 42.1 % (ref 34–46.6)
HGB BLD-MCNC: 13.6 G/DL (ref 12–15.9)
IMM GRANULOCYTES # BLD AUTO: 0.02 10*3/MM3 (ref 0–0.05)
IMM GRANULOCYTES NFR BLD AUTO: 0.3 % (ref 0–0.5)
LYMPHOCYTES # BLD AUTO: 1.52 10*3/MM3 (ref 0.7–3.1)
LYMPHOCYTES NFR BLD AUTO: 26.6 % (ref 19.6–45.3)
MCH RBC QN AUTO: 29.6 PG (ref 26.6–33)
MCHC RBC AUTO-ENTMCNC: 32.3 G/DL (ref 31.5–35.7)
MCV RBC AUTO: 91.5 FL (ref 79–97)
MONOCYTES # BLD AUTO: 0.43 10*3/MM3 (ref 0.1–0.9)
MONOCYTES NFR BLD AUTO: 7.5 % (ref 5–12)
NEUTROPHILS NFR BLD AUTO: 3.64 10*3/MM3 (ref 1.7–7)
NEUTROPHILS NFR BLD AUTO: 63.7 % (ref 42.7–76)
NRBC BLD AUTO-RTO: 0 /100 WBC (ref 0–0.2)
PLATELET # BLD AUTO: 213 10*3/MM3 (ref 140–450)
PMV BLD AUTO: 9.5 FL (ref 6–12)
POTASSIUM SERPL-SCNC: 4.4 MMOL/L (ref 3.5–4.7)
PROT SERPL-MCNC: 7 G/DL (ref 6.3–8)
RBC # BLD AUTO: 4.6 10*6/MM3 (ref 3.77–5.28)
SODIUM SERPL-SCNC: 140 MMOL/L (ref 134–145)
WBC # BLD AUTO: 5.72 10*3/MM3 (ref 3.4–10.8)

## 2020-11-12 PROCEDURE — 99213 OFFICE O/P EST LOW 20 MIN: CPT | Performed by: INTERNAL MEDICINE

## 2020-11-12 PROCEDURE — 80053 COMPREHEN METABOLIC PANEL: CPT

## 2020-11-12 PROCEDURE — 85025 COMPLETE CBC W/AUTO DIFF WBC: CPT

## 2020-11-12 PROCEDURE — 36415 COLL VENOUS BLD VENIPUNCTURE: CPT

## 2020-11-12 NOTE — PROGRESS NOTES
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 female with the above-noted history of LCIS of the left breast in 2000. She had lumpectomy and took 5 years of tamoxifen as breast cancer prevention. She finished her tamoxifen 15 years ago . She is here today for an annual followup. Her mammogram was performed 7/14/2020 with no suspicious findings.    It is been recommended that due to her history and the fact that she has dense breasts that she undergo 3-D mammogram yearly.    She is up to date on her colonoscopies.  She had normal bone density on DEXA scan from 10/22/2020  History of Present Illness     Past Medical History, Past Surgical History, Social History, Family History have been reviewed and are without significant changes except as mentioned.    Review of Systems   Constitutional: Negative for activity change, appetite change, fatigue, fever and unexpected weight change.   HENT: Negative for hearing loss, nosebleeds, trouble swallowing and voice change.    Eyes: Negative for visual disturbance.   Respiratory: Negative for cough, shortness of breath and wheezing.    Cardiovascular: Negative for chest pain and palpitations.   Gastrointestinal: Negative for abdominal pain, diarrhea, nausea and vomiting.   Genitourinary: Negative for difficulty urinating, frequency, hematuria and urgency.   Musculoskeletal: Negative for back pain and neck pain.   Skin: Negative for rash.   Neurological: Negative for dizziness, seizures, syncope and headaches.   Hematological: Negative for adenopathy. Does not bruise/bleed easily.   Psychiatric/Behavioral: Negative for behavioral problems. The patient is not nervous/anxious.    Unchanged 11/12/2020  A comprehensive 14 point review of systems was performed and was negative except as mentioned.    Medications:  The current medication list was reviewed in the  "[facility]    ALLERGIES:  No Known Allergies    Objective      Vitals:    11/12/20 1303   BP: 147/77   Pulse: 77   Resp: 16   Temp: 97.5 °F (36.4 °C)   TempSrc: Temporal   SpO2: 99%   Weight: 55.2 kg (121 lb 11.2 oz)   Height: 160 cm (63\")  Comment: new ht   PainSc: 0-No pain     Current Status 11/12/2020   ECOG score 0       Physical Exam   Constitutional: She is oriented to person, place, and time. She appears well-developed. No distress.   HENT:   Head: Normocephalic.   Eyes: Pupils are equal, round, and reactive to light.   Neck: Neck supple. No JVD present. No thyromegaly present.   Cardiovascular: Normal rate and regular rhythm. Exam reveals no gallop and no friction rub.   No murmur heard.  Pulmonary/Chest: Effort normal and breath sounds normal. She has no wheezes. She has no rales. Right breast exhibits no mass, no nipple discharge and no skin change. Left breast exhibits no mass, no nipple discharge and no skin change.   Abdominal: Soft. Bowel sounds are normal. She exhibits no distension and no mass. There is no abdominal tenderness.   Musculoskeletal: No deformity.   Neurological: She is alert and oriented to person, place, and time. She has normal reflexes. No cranial nerve deficit.   Skin: Skin is dry. No rash noted.   Psychiatric: Judgment normal.   Repeated and unchanged 11/12/2020    RECENT LABS:  Hematology WBC   Date Value Ref Range Status   11/12/2020 5.72 3.40 - 10.80 10*3/mm3 Final     RBC   Date Value Ref Range Status   11/12/2020 4.60 3.77 - 5.28 10*6/mm3 Final     Hemoglobin   Date Value Ref Range Status   11/12/2020 13.6 12.0 - 15.9 g/dL Final     Hematocrit   Date Value Ref Range Status   11/12/2020 42.1 34.0 - 46.6 % Final     Platelets   Date Value Ref Range Status   11/12/2020 213 140 - 450 10*3/mm3 Final          SCREENING MAMMOGRAM WITH R2 COMPUTERIZED ASSISTED DETECTION AND DIGITAL  TOMOSYNTHESIS 7/14/2020  IMPRESSION:  1. There is no evidence for malignancy or significant change in " either  breast. Routine followup mammography is recommended.       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 She continues to do well with no evidence of malignancy on exam or on her mammograms.     PLAN:   1. The patient will return to see us again in one year for routine followup.   She will be due for annual screening mammogram again in July 2021 11/12/2020      CC:

## 2020-12-09 ENCOUNTER — LAB REQUISITION (OUTPATIENT)
Dept: LAB | Facility: HOSPITAL | Age: 70
End: 2020-12-09

## 2020-12-09 DIAGNOSIS — Z00.00 ENCOUNTER FOR GENERAL ADULT MEDICAL EXAMINATION WITHOUT ABNORMAL FINDINGS: ICD-10-CM

## 2020-12-11 PROCEDURE — U0004 COV-19 TEST NON-CDC HGH THRU: HCPCS | Performed by: SURGERY

## 2020-12-12 LAB — SARS-COV-2 RNA RESP QL NAA+PROBE: NOT DETECTED

## 2020-12-14 ENCOUNTER — OUTSIDE FACILITY SERVICE (OUTPATIENT)
Dept: SURGERY | Facility: CLINIC | Age: 70
End: 2020-12-14

## 2020-12-14 PROCEDURE — G0121 COLON CA SCRN NOT HI RSK IND: HCPCS | Performed by: SURGERY

## 2021-02-22 DIAGNOSIS — E78.2 MIXED HYPERLIPIDEMIA: ICD-10-CM

## 2021-02-22 RX ORDER — ATORVASTATIN CALCIUM 20 MG/1
TABLET, FILM COATED ORAL
Qty: 90 TABLET | Refills: 2 | Status: SHIPPED | OUTPATIENT
Start: 2021-02-22 | End: 2021-11-16

## 2021-03-04 ENCOUNTER — IMMUNIZATION (OUTPATIENT)
Dept: VACCINE CLINIC | Facility: HOSPITAL | Age: 71
End: 2021-03-04

## 2021-03-04 PROCEDURE — 91300 HC SARSCOV02 VAC 30MCG/0.3ML IM: CPT | Performed by: INTERNAL MEDICINE

## 2021-03-04 PROCEDURE — 0001A: CPT | Performed by: INTERNAL MEDICINE

## 2021-03-25 ENCOUNTER — IMMUNIZATION (OUTPATIENT)
Dept: VACCINE CLINIC | Facility: HOSPITAL | Age: 71
End: 2021-03-25

## 2021-03-25 PROCEDURE — 0002A: CPT | Performed by: INTERNAL MEDICINE

## 2021-03-25 PROCEDURE — 91300 HC SARSCOV02 VAC 30MCG/0.3ML IM: CPT | Performed by: INTERNAL MEDICINE

## 2021-04-12 ENCOUNTER — OFFICE VISIT (OUTPATIENT)
Dept: INTERNAL MEDICINE | Facility: CLINIC | Age: 71
End: 2021-04-12

## 2021-04-12 VITALS
TEMPERATURE: 97.8 F | HEIGHT: 63 IN | BODY MASS INDEX: 21.09 KG/M2 | OXYGEN SATURATION: 99 % | SYSTOLIC BLOOD PRESSURE: 138 MMHG | DIASTOLIC BLOOD PRESSURE: 80 MMHG | WEIGHT: 119 LBS | HEART RATE: 84 BPM | RESPIRATION RATE: 16 BRPM

## 2021-04-12 DIAGNOSIS — R03.0 BORDERLINE HIGH BLOOD PRESSURE: ICD-10-CM

## 2021-04-12 DIAGNOSIS — Z12.31 ENCOUNTER FOR SCREENING MAMMOGRAM FOR BREAST CANCER: ICD-10-CM

## 2021-04-12 DIAGNOSIS — R12 HEARTBURN: ICD-10-CM

## 2021-04-12 DIAGNOSIS — R79.89 ELEVATED LIVER FUNCTION TESTS: ICD-10-CM

## 2021-04-12 DIAGNOSIS — Z00.00 MEDICARE ANNUAL WELLNESS VISIT, SUBSEQUENT: Primary | ICD-10-CM

## 2021-04-12 DIAGNOSIS — E78.5 HYPERLIPIDEMIA, UNSPECIFIED HYPERLIPIDEMIA TYPE: Chronic | ICD-10-CM

## 2021-04-12 DIAGNOSIS — R53.83 FATIGUE, UNSPECIFIED TYPE: ICD-10-CM

## 2021-04-12 LAB
ALBUMIN SERPL-MCNC: 4.9 G/DL (ref 3.5–5.2)
ALBUMIN/GLOB SERPL: 2.6 G/DL
ALP SERPL-CCNC: 54 U/L (ref 39–117)
ALT SERPL-CCNC: 19 U/L (ref 1–33)
APPEARANCE UR: CLEAR
AST SERPL-CCNC: 27 U/L (ref 1–32)
BASOPHILS # BLD AUTO: 0.04 10*3/MM3 (ref 0–0.2)
BASOPHILS NFR BLD AUTO: 0.9 % (ref 0–1.5)
BILIRUB SERPL-MCNC: 0.6 MG/DL (ref 0–1.2)
BILIRUB UR QL STRIP: NEGATIVE
BUN SERPL-MCNC: 25 MG/DL (ref 8–23)
BUN/CREAT SERPL: 36.2 (ref 7–25)
CALCIUM SERPL-MCNC: 10.1 MG/DL (ref 8.6–10.5)
CHLORIDE SERPL-SCNC: 105 MMOL/L (ref 98–107)
CHOLEST SERPL-MCNC: 152 MG/DL (ref 0–200)
CO2 SERPL-SCNC: 27.2 MMOL/L (ref 22–29)
COLOR UR: YELLOW
CREAT SERPL-MCNC: 0.69 MG/DL (ref 0.57–1)
EOSINOPHIL # BLD AUTO: 0.07 10*3/MM3 (ref 0–0.4)
EOSINOPHIL NFR BLD AUTO: 1.5 % (ref 0.3–6.2)
ERYTHROCYTE [DISTWIDTH] IN BLOOD BY AUTOMATED COUNT: 12.8 % (ref 12.3–15.4)
GLOBULIN SER CALC-MCNC: 1.9 GM/DL
GLUCOSE SERPL-MCNC: 90 MG/DL (ref 65–99)
GLUCOSE UR QL: NEGATIVE
HCT VFR BLD AUTO: 44.4 % (ref 34–46.6)
HDLC SERPL-MCNC: 70 MG/DL (ref 40–60)
HGB BLD-MCNC: 14.8 G/DL (ref 12–15.9)
HGB UR QL STRIP: NEGATIVE
IMM GRANULOCYTES # BLD AUTO: 0.01 10*3/MM3 (ref 0–0.05)
IMM GRANULOCYTES NFR BLD AUTO: 0.2 % (ref 0–0.5)
KETONES UR QL STRIP: NEGATIVE
LDLC SERPL CALC-MCNC: 62 MG/DL (ref 0–100)
LEUKOCYTE ESTERASE UR QL STRIP: NEGATIVE
LYMPHOCYTES # BLD AUTO: 1.16 10*3/MM3 (ref 0.7–3.1)
LYMPHOCYTES NFR BLD AUTO: 25.6 % (ref 19.6–45.3)
Lab: NORMAL
MCH RBC QN AUTO: 30 PG (ref 26.6–33)
MCHC RBC AUTO-ENTMCNC: 33.3 G/DL (ref 31.5–35.7)
MCV RBC AUTO: 90.1 FL (ref 79–97)
MONOCYTES # BLD AUTO: 0.39 10*3/MM3 (ref 0.1–0.9)
MONOCYTES NFR BLD AUTO: 8.6 % (ref 5–12)
NEUTROPHILS # BLD AUTO: 2.87 10*3/MM3 (ref 1.7–7)
NEUTROPHILS NFR BLD AUTO: 63.2 % (ref 42.7–76)
NITRITE UR QL STRIP: NEGATIVE
NRBC BLD AUTO-RTO: 0 /100 WBC (ref 0–0.2)
PH UR STRIP: 5.5 [PH] (ref 5–8)
PLATELET # BLD AUTO: 235 10*3/MM3 (ref 140–450)
POTASSIUM SERPL-SCNC: 4.5 MMOL/L (ref 3.5–5.2)
PROT SERPL-MCNC: 6.8 G/DL (ref 6–8.5)
PROT UR QL STRIP: NEGATIVE
RBC # BLD AUTO: 4.93 10*6/MM3 (ref 3.77–5.28)
SODIUM SERPL-SCNC: 141 MMOL/L (ref 136–145)
SP GR UR: 1.02 (ref 1–1.03)
TRIGL SERPL-MCNC: 115 MG/DL (ref 0–150)
TSH SERPL DL<=0.005 MIU/L-ACNC: 3.42 UIU/ML (ref 0.27–4.2)
UROBILINOGEN UR STRIP-MCNC: NORMAL MG/DL
VLDLC SERPL CALC-MCNC: 20 MG/DL (ref 5–40)
WBC # BLD AUTO: 4.54 10*3/MM3 (ref 3.4–10.8)

## 2021-04-12 PROCEDURE — G0439 PPPS, SUBSEQ VISIT: HCPCS | Performed by: INTERNAL MEDICINE

## 2021-04-12 NOTE — PATIENT INSTRUCTIONS
Medicare Wellness  Personal Prevention Plan of Service     Date of Office Visit:  2021  Encounter Provider:  Purvi Martinez MD  Place of Service:  [facility] PRIMARY C[facility]  Patient Name: Mag Palomino  :  1950    As part of the Medicare Wellness portion of your visit today, we are providing you with this personalized preventive plan of services (PPPS). This plan is based upon recommendations of the [place] Preventive Services Task Force (USPSTF) and the Advisory Committee on Immunization Practices (ACIP).    This lists the preventive care services that should be considered, and provides dates of when you are due. Items listed as completed are up-to-date and do not require any further intervention.    Health Maintenance   Topic Date Due   • MAMMOGRAM  2021   • INFLUENZA VACCINE  2021   • LIPID PANEL  08/10/2021   • ANNUAL WELLNESS VISIT  2022   • DXA SCAN  10/22/2022   • TDAP/TD VACCINES (2 - Td) 2023   • COLONOSCOPY  2025   • HEPATITIS C SCREENING  Completed   • COVID-19 Vaccine  Completed   • Pneumococcal Vaccine 65+  Completed   • ZOSTER VACCINE  Completed   • MENINGOCOCCAL VACCINE  Aged Out   • PAP SMEAR  Discontinued       Orders Placed This Encounter   Procedures   • Mammo Screening Bilateral With CAD     Standing Status:   Future     Standing Expiration Date:   2022     Scheduling Instructions:      After 7/15/2021     Order Specific Question:   Reason for Exam:     Answer:   screen   • Lipid Panel   • Comprehensive Metabolic Panel     Order Specific Question:   Release to patient     Answer:   Immediate   • TSH Rfx On Abnormal To Free T4     Order Specific Question:   Release to patient     Answer:   Immediate   • Urinalysis With Microscopic If Indicated (No Culture) - Urine, Clean Catch     Order Specific Question:   Release to patient     Answer:   Immediate   • CBC & Differential     Order Specific Question:   Manual  Differential     Answer:   No       No follow-ups on file.

## 2021-04-12 NOTE — PROGRESS NOTES
The ABCs of the Annual Wellness Visit  Subsequent Medicare Wellness Visit    Chief Complaint   Patient presents with   • Medicare Wellness-subsequent       Subjective   History of Present Illness:  Mag Palomino is a 71 y.o. female who presents for a Subsequent Medicare Wellness Visit.    HEALTH RISK ASSESSMENT    Recent Hospitalizations:  No hospitalization(s) within the last year.    Current Medical Providers:  Patient Care Team:  Purvi Martinez MD as PCP - General  Michael James MD as Consulting Physician (Hematology and Oncology)  Yessy Hui MD as Consulting Physician (Dermatology)  Yifan Salmeron MD as Consulting Physician (Otolaryngology)  Jose De La Torre MD as Consulting Physician (Cardiology)  Purvi Martinez MD as Referring Physician (Internal Medicine)  Mag York MD as Surgeon (General Surgery)  Stephanie Avendano MD as Consulting Physician (Ophthalmology)    Smoking Status:  Social History     Tobacco Use   Smoking Status Never Smoker   Smokeless Tobacco Never Used       Alcohol Consumption:  Social History     Substance and Sexual Activity   Alcohol Use Yes   • Alcohol/week: 1.0 - 2.0 standard drinks   • Types: 1 - 2 Glasses of wine per week    Comment: social       Depression Screen:   PHQ-2/PHQ-9 Depression Screening 4/12/2021   Little interest or pleasure in doing things 0   Feeling down, depressed, or hopeless 0   Trouble falling or staying asleep, or sleeping too much 0   Feeling tired or having little energy 0   Poor appetite or overeating 0   Feeling bad about yourself - or that you are a failure or have let yourself or your family down 0   Trouble concentrating on things, such as reading the newspaper or watching television 0   Moving or speaking so slowly that other people could have noticed. Or the opposite - being so fidgety or restless that you have been moving around a lot more than usual 0   Thoughts that you would be better off dead, or of  hurting yourself in some way 0   Total Score 0       Fall Risk Screen:  FREDERICK Fall Risk Assessment was completed, and patient is at LOW risk for falls.Assessment completed on:4/12/2021    Health Habits and Functional and Cognitive Screening:  Functional & Cognitive Status 4/12/2021   Do you have difficulty preparing food and eating? No   Do you have difficulty bathing yourself, getting dressed or grooming yourself? No   Do you have difficulty using the toilet? No   Do you have difficulty moving around from place to place? No   Do you have trouble with steps or getting out of a bed or a chair? No   Current Diet Well Balanced Diet   Dental Exam Up to date   Eye Exam Up to date        Eye Exam Comment -   Exercise (times per week) 5 times per week   Current Exercise Activities Include Walking   Do you need help using the phone?  No   Are you deaf or do you have serious difficulty hearing?  No   Do you need help with transportation? No   Do you need help shopping? No   Do you need help preparing meals?  No   Do you need help with housework?  No   Do you need help with laundry? No   Do you need help taking your medications? No   Do you need help managing money? No   Do you ever drive or ride in a car without wearing a seat belt? No   Have you felt unusual stress, anger or loneliness in the last month? No   Who do you live with? Spouse   If you need help, do you have trouble finding someone available to you? No   Have you been bothered in the last four weeks by sexual problems? No   Do you have difficulty concentrating, remembering or making decisions? No         Does the patient have evidence of cognitive impairment? No    Asprin use counseling:Does not need ASA (and currently is not on it)    Age-appropriate Screening Schedule:  Refer to the list below for future screening recommendations based on patient's age, sex and/or medical conditions. Orders for these recommended tests are listed in the plan section. The  patient has been provided with a written plan.    Health Maintenance   Topic Date Due   • MAMMOGRAM  07/14/2021   • INFLUENZA VACCINE  08/01/2021   • LIPID PANEL  08/10/2021   • DXA SCAN  10/22/2022   • TDAP/TD VACCINES (2 - Td) 08/01/2023   • COLONOSCOPY  12/14/2025   • ZOSTER VACCINE  Completed   • PAP SMEAR  Discontinued          The following portions of the patient's history were reviewed and updated as appropriate: allergies, current medications, past family history, past medical history, past social history, past surgical history and problem list.    Outpatient Medications Prior to Visit   Medication Sig Dispense Refill   • acetaminophen (TYLENOL) 325 MG tablet Take 650 mg by mouth Every 6 (Six) Hours As Needed for mild pain (1-3).     • atorvastatin (LIPITOR) 20 MG tablet TAKE ONE TABLET BY MOUTH DAILY 90 tablet 2   • cetirizine (ZyrTEC) 10 MG tablet Take 10 mg by mouth daily.     • docusate sodium (COLACE) 100 MG capsule Take 100 mg by mouth as needed for constipation.     • lansoprazole (PREVACID) 15 MG capsule Take 15 mg by mouth As Needed.     • Multiple Vitamin (MULTIVITAMINS PO) Take 1 tablet by mouth Daily.     • Omega-3 Fatty Acids (FISH OIL) 1000 MG capsule capsule Take 1,000 mg by mouth Daily With Breakfast.     • spironolactone (ALDACTONE) 100 MG tablet Take 1 tablet by mouth daily.       No facility-administered medications prior to visit.       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 Patient has an advance directive in EMR which is still valid.     Review of  "Systems    Compared to one year ago, the patient feels her physical health is the same.  Compared to one year ago, the patient feels her mental health is the same.    Reviewed chart for potential of high risk medication in the elderly: yes  Reviewed chart for potential of harmful drug interactions in the elderly:yes    Objective         Vitals:    04/12/21 0905 04/12/21 0937   BP: 145/81 138/80   BP Location: Left arm    Patient Position: Sitting    Cuff Size: Adult    Pulse: 84    Resp: 16    Temp: 97.8 °F (36.6 °C)    TempSrc: Temporal    SpO2: 99%    Weight: 54 kg (119 lb)    Height: 160 cm (63\")    PainSc: 0-No pain        Body mass index is 21.08 kg/m².  Discussed the patient's BMI with her. The BMI is in the acceptable range.    Physical Exam          Assessment/Plan   Medicare Risks and Personalized Health Plan  CMS Preventative Services Quick Reference  Abdominal Aortic Aneurysm Screening  Breast Cancer/Mammogram Screening  Osteoprorosis Risk    The above risks/problems have been discussed with the patient.  Pertinent information has been shared with the patient in the After Visit Summary.  Follow up plans and orders are seen below in the Assessment/Plan Section.    Diagnoses and all orders for this visit:    1. Medicare annual wellness visit, subsequent (Primary)    2. Hyperlipidemia, unspecified hyperlipidemia type  -     Lipid Panel  -     Comprehensive Metabolic Panel    3. Heartburn    4. Borderline high blood pressure  -     Lipid Panel  -     Comprehensive Metabolic Panel  -     CBC & Differential  -     Urinalysis With Microscopic If Indicated (No Culture) - Urine, Clean Catch    5. Encounter for screening mammogram for breast cancer  -     Mammo Screening Bilateral With CAD; Future    6. Elevated liver function tests    7. Fatigue, unspecified type  -     CBC & Differential  -     TSH Rfx On Abnormal To Free T4      Follow Up:  Return in about 6 months (around 10/12/2021) for Recheck.     An After " Visit Summary and PPPS were given to the patient.        Need screening for AAA & carotid disease.  Information given today.   Need daily strengthening & balance exercises (shown today).

## 2021-04-21 ENCOUNTER — TRANSCRIBE ORDERS (OUTPATIENT)
Dept: ADMINISTRATIVE | Facility: HOSPITAL | Age: 71
End: 2021-04-21

## 2021-04-21 DIAGNOSIS — Z12.31 SCREENING MAMMOGRAM, ENCOUNTER FOR: Primary | ICD-10-CM

## 2021-07-16 ENCOUNTER — HOSPITAL ENCOUNTER (OUTPATIENT)
Dept: MAMMOGRAPHY | Facility: HOSPITAL | Age: 71
Discharge: HOME | End: 2021-07-16
Admitting: INTERNAL MEDICINE

## 2021-07-16 DIAGNOSIS — Z12.31 SCREENING MAMMOGRAM, ENCOUNTER FOR: ICD-10-CM

## 2021-07-16 PROCEDURE — 77063 BREAST TOMOSYNTHESIS BI: CPT

## 2021-07-16 PROCEDURE — 77067 SCR MAMMO BI INCL CAD: CPT

## 2021-08-21 NOTE — PROGRESS NOTES
"Subjective   Mag Palomino is a 67 y.o. female here for   Chief Complaint   Patient presents with   • Hypertension   • Abdominal Pain     right   .    Vitals:    06/20/17 0838   BP: 130/84   BP Location: Left arm   Patient Position: Sitting   Cuff Size: Adult   Temp: 98.3 °F (36.8 °C)   Weight: 122 lb (55.3 kg)   Height: 63.5\" (161.3 cm)       Hypertension   This is a chronic problem. The current episode started more than 1 year ago. The problem is unchanged. The problem is controlled. Pertinent negatives include no chest pain, malaise/fatigue, palpitations, peripheral edema or shortness of breath.   Abdominal Pain   This is a recurrent problem. The current episode started more than 1 year ago. The onset quality is sudden. The problem occurs rarely. The problem has been waxing and waning. The pain is located in the RUQ. The pain is mild. The quality of the pain is aching. The abdominal pain does not radiate. Associated symptoms include constipation (off/on). Pertinent negatives include no anorexia, diarrhea, dysuria, fever, hematuria, nausea or vomiting.        Encounter Diagnoses   Name Primary?   • Other hyperlipidemia Yes   • Seasonal allergic rhinitis, unspecified allergic rhinitis trigger    • History of colonic polyps    • Essential hypertension    • Right upper quadrant abdominal pain        The following portions of the patient's history were reviewed and updated as appropriate: allergies, current medications, past social history and problem list.    Review of Systems   Constitutional: Negative for chills, fatigue, fever and malaise/fatigue.   Respiratory: Negative for cough, shortness of breath and wheezing.    Cardiovascular: Negative for chest pain, palpitations and leg swelling.   Gastrointestinal: Positive for abdominal pain (RUQ pain off & on) and constipation (off/on). Negative for anorexia, diarrhea, nausea and vomiting.   Genitourinary: Negative for dysuria and hematuria.       Objective "   Physical Exam   Constitutional: She appears well-developed and well-nourished. No distress.   Cardiovascular: Normal rate, regular rhythm and normal heart sounds.    Pulmonary/Chest: No respiratory distress. She has no wheezes. She has no rales. She exhibits no tenderness.   Abdominal: Soft. Bowel sounds are normal. She exhibits no distension and no mass. There is no tenderness. There is no rebound and no guarding. No hernia.   Musculoskeletal: She exhibits no edema.   Psychiatric: She has a normal mood and affect. Her behavior is normal.   Nursing note and vitals reviewed.      Assessment/Plan   Problem List Items Addressed This Visit        Unprioritized    Allergic rhinitis (Chronic)    Hyperlipidemia - Primary (Chronic)    Relevant Orders    Lipid panel (Completed)    Comprehensive metabolic panel (Completed)    History of colonic polyps (Chronic)    Hypertension      Other Visit Diagnoses     Right upper quadrant abdominal pain        Relevant Orders    US Gallbladder         Mild & occ RUQ abd discomfort - need u/s.  Keep diary of precipitating foods - may need HIDA scan.     HTN & high chol stable.  Labs today.     History of colon polyps - need c-scope if not done since 2010.      There are no Wet Read(s) to document.

## 2021-10-09 ENCOUNTER — IMMUNIZATION (OUTPATIENT)
Dept: VACCINE CLINIC | Facility: HOSPITAL | Age: 71
End: 2021-10-09

## 2021-10-09 PROCEDURE — 0004A ADM SARSCOV2 30MCG/0.3ML BOOSTER: CPT | Performed by: INTERNAL MEDICINE

## 2021-10-09 PROCEDURE — 0003A: CPT | Performed by: INTERNAL MEDICINE

## 2021-10-09 PROCEDURE — 91300 HC SARSCOV02 VAC 30MCG/0.3ML IM: CPT | Performed by: INTERNAL MEDICINE

## 2021-10-28 ENCOUNTER — OFFICE VISIT (OUTPATIENT)
Dept: INTERNAL MEDICINE | Facility: CLINIC | Age: 71
End: 2021-10-28

## 2021-10-28 VITALS
OXYGEN SATURATION: 98 % | DIASTOLIC BLOOD PRESSURE: 80 MMHG | HEART RATE: 75 BPM | BODY MASS INDEX: 21.09 KG/M2 | HEIGHT: 63 IN | SYSTOLIC BLOOD PRESSURE: 130 MMHG | WEIGHT: 119 LBS | TEMPERATURE: 98 F | RESPIRATION RATE: 17 BRPM

## 2021-10-28 DIAGNOSIS — R03.0 BORDERLINE HIGH BLOOD PRESSURE: ICD-10-CM

## 2021-10-28 DIAGNOSIS — E78.5 HYPERLIPIDEMIA, UNSPECIFIED HYPERLIPIDEMIA TYPE: Primary | Chronic | ICD-10-CM

## 2021-10-28 DIAGNOSIS — R53.83 FATIGUE, UNSPECIFIED TYPE: ICD-10-CM

## 2021-10-28 DIAGNOSIS — L70.9 ACNE, UNSPECIFIED ACNE TYPE: ICD-10-CM

## 2021-10-28 PROCEDURE — 99213 OFFICE O/P EST LOW 20 MIN: CPT | Performed by: INTERNAL MEDICINE

## 2021-10-28 NOTE — PROGRESS NOTES
"Chief Complaint  Hyperlipidemia (6 month f/u.)    Subjective          Mag Palomino presents to [facility] for   Hyperlipidemia  This is a chronic problem. The current episode started more than 1 year ago. The problem is controlled. Recent lipid tests were reviewed and are normal. She has no history of diabetes.       Review of Systems   Constitutional: Positive for fatigue.        Objective   Vital Signs:   /80   Pulse 75   Temp 98 °F (36.7 °C)   Resp 17   Ht 160 cm (63\")   Wt 54 kg (119 lb)   SpO2 98%   BMI 21.08 kg/m²     Physical Exam  Vitals and nursing note reviewed.   Constitutional:       General: She is not in acute distress.     Appearance: She is well-developed.   Cardiovascular:      Rate and Rhythm: Normal rate and regular rhythm.      Heart sounds: Normal heart sounds.   Pulmonary:      Effort: No respiratory distress.      Breath sounds: No wheezing or rales.   Chest:      Chest wall: No tenderness.   Psychiatric:         Behavior: Behavior normal.        Result Review :                 Assessment and Plan    Problem List Items Addressed This Visit        Unprioritized    Hyperlipidemia - Primary (Chronic)    Current Assessment & Plan     Lipid abnormalities are improving with treatment.  Nutritional counseling was provided.  Lipids will be reassessed in 6 months.         Relevant Orders    Comprehensive Metabolic Panel    Lipid Panel    Borderline high blood pressure    Current Assessment & Plan     Continue diet/exercise.         Relevant Orders    Comprehensive Metabolic Panel    Lipid Panel    Fatigue    Relevant Orders    TSH Rfx On Abnormal To Free T4    Acne    Overview     Continue aldactone per dermatologist.               Follow Up   Return in about 6 months (around 4/28/2022) for Medicare Wellness.  Patient was given instructions and counseling regarding her condition or for health maintenance advice. Please see specific information pulled " into the AVS if appropriate.

## 2021-10-29 LAB
ALBUMIN SERPL-MCNC: 4.8 G/DL (ref 3.8–4.8)
ALBUMIN/GLOB SERPL: 2.2 {RATIO} (ref 1.2–2.2)
ALP SERPL-CCNC: 52 IU/L (ref 44–121)
ALT SERPL-CCNC: 22 IU/L (ref 0–32)
AST SERPL-CCNC: 26 IU/L (ref 0–40)
BILIRUB SERPL-MCNC: 0.6 MG/DL (ref 0–1.2)
BUN SERPL-MCNC: 15 MG/DL (ref 8–27)
BUN/CREAT SERPL: 22 (ref 12–28)
CALCIUM SERPL-MCNC: 9.4 MG/DL (ref 8.7–10.3)
CHLORIDE SERPL-SCNC: 104 MMOL/L (ref 96–106)
CHOLEST SERPL-MCNC: 147 MG/DL (ref 100–199)
CO2 SERPL-SCNC: 25 MMOL/L (ref 20–29)
CREAT SERPL-MCNC: 0.68 MG/DL (ref 0.57–1)
GLOBULIN SER CALC-MCNC: 2.2 G/DL (ref 1.5–4.5)
GLUCOSE SERPL-MCNC: 92 MG/DL (ref 65–99)
HDLC SERPL-MCNC: 67 MG/DL
LDLC SERPL CALC-MCNC: 63 MG/DL (ref 0–99)
Lab: NORMAL
POTASSIUM SERPL-SCNC: 4.4 MMOL/L (ref 3.5–5.2)
PROT SERPL-MCNC: 7 G/DL (ref 6–8.5)
SODIUM SERPL-SCNC: 142 MMOL/L (ref 134–144)
TRIGL SERPL-MCNC: 94 MG/DL (ref 0–149)
TSH SERPL DL<=0.005 MIU/L-ACNC: 3.49 UIU/ML (ref 0.45–4.5)
VLDLC SERPL CALC-MCNC: 17 MG/DL (ref 5–40)

## 2021-11-16 DIAGNOSIS — E78.2 MIXED HYPERLIPIDEMIA: ICD-10-CM

## 2021-11-16 RX ORDER — ATORVASTATIN CALCIUM 20 MG/1
TABLET, FILM COATED ORAL
Qty: 90 TABLET | Refills: 2 | Status: SHIPPED | OUTPATIENT
Start: 2021-11-16 | End: 2022-08-22

## 2021-12-02 ENCOUNTER — OFFICE VISIT (OUTPATIENT)
Dept: ONCOLOGY | Facility: CLINIC | Age: 71
End: 2021-12-02

## 2021-12-02 ENCOUNTER — LAB (OUTPATIENT)
Dept: LAB | Facility: HOSPITAL | Age: 71
End: 2021-12-02

## 2021-12-02 VITALS
OXYGEN SATURATION: 99 % | BODY MASS INDEX: 19.81 KG/M2 | DIASTOLIC BLOOD PRESSURE: 65 MMHG | SYSTOLIC BLOOD PRESSURE: 124 MMHG | TEMPERATURE: 98 F | HEIGHT: 64 IN | WEIGHT: 116 LBS | HEART RATE: 69 BPM | RESPIRATION RATE: 16 BRPM

## 2021-12-02 DIAGNOSIS — D05.02 NEOPLASM OF LEFT BREAST, PRIMARY TUMOR STAGING CATEGORY TIS: LOBULAR CARCINOMA IN SITU (LCIS): Primary | ICD-10-CM

## 2021-12-02 LAB
ALBUMIN SERPL-MCNC: 4.8 G/DL (ref 3.5–5.2)
ALBUMIN/GLOB SERPL: 1.9 G/DL (ref 1.1–2.4)
ALP SERPL-CCNC: 54 U/L (ref 38–116)
ALT SERPL W P-5'-P-CCNC: 19 U/L (ref 0–33)
ANION GAP SERPL CALCULATED.3IONS-SCNC: 10.1 MMOL/L (ref 5–15)
AST SERPL-CCNC: 23 U/L (ref 0–32)
BASOPHILS # BLD AUTO: 0.04 10*3/MM3 (ref 0–0.2)
BASOPHILS NFR BLD AUTO: 0.6 % (ref 0–1.5)
BILIRUB SERPL-MCNC: 0.5 MG/DL (ref 0.2–1.2)
BUN SERPL-MCNC: 21 MG/DL (ref 6–20)
BUN/CREAT SERPL: 25.6 (ref 7.3–30)
CALCIUM SPEC-SCNC: 9.9 MG/DL (ref 8.5–10.2)
CHLORIDE SERPL-SCNC: 101 MMOL/L (ref 98–107)
CO2 SERPL-SCNC: 27.9 MMOL/L (ref 22–29)
CREAT SERPL-MCNC: 0.82 MG/DL (ref 0.6–1.1)
DEPRECATED RDW RBC AUTO: 42 FL (ref 37–54)
EOSINOPHIL # BLD AUTO: 0.08 10*3/MM3 (ref 0–0.4)
EOSINOPHIL NFR BLD AUTO: 1.2 % (ref 0.3–6.2)
ERYTHROCYTE [DISTWIDTH] IN BLOOD BY AUTOMATED COUNT: 12.7 % (ref 12.3–15.4)
GFR SERPL CREATININE-BSD FRML MDRD: 69 ML/MIN/1.73
GLOBULIN UR ELPH-MCNC: 2.5 GM/DL (ref 1.8–3.5)
GLUCOSE SERPL-MCNC: 95 MG/DL (ref 74–124)
HCT VFR BLD AUTO: 44.1 % (ref 34–46.6)
HGB BLD-MCNC: 14.4 G/DL (ref 12–15.9)
IMM GRANULOCYTES # BLD AUTO: 0.01 10*3/MM3 (ref 0–0.05)
IMM GRANULOCYTES NFR BLD AUTO: 0.2 % (ref 0–0.5)
LYMPHOCYTES # BLD AUTO: 1.85 10*3/MM3 (ref 0.7–3.1)
LYMPHOCYTES NFR BLD AUTO: 28.2 % (ref 19.6–45.3)
MCH RBC QN AUTO: 29.8 PG (ref 26.6–33)
MCHC RBC AUTO-ENTMCNC: 32.7 G/DL (ref 31.5–35.7)
MCV RBC AUTO: 91.1 FL (ref 79–97)
MONOCYTES # BLD AUTO: 0.55 10*3/MM3 (ref 0.1–0.9)
MONOCYTES NFR BLD AUTO: 8.4 % (ref 5–12)
NEUTROPHILS NFR BLD AUTO: 4.04 10*3/MM3 (ref 1.7–7)
NEUTROPHILS NFR BLD AUTO: 61.4 % (ref 42.7–76)
NRBC BLD AUTO-RTO: 0 /100 WBC (ref 0–0.2)
PLATELET # BLD AUTO: 185 10*3/MM3 (ref 140–450)
PMV BLD AUTO: 9.4 FL (ref 6–12)
POTASSIUM SERPL-SCNC: 4.4 MMOL/L (ref 3.5–4.7)
PROT SERPL-MCNC: 7.3 G/DL (ref 6.3–8)
RBC # BLD AUTO: 4.84 10*6/MM3 (ref 3.77–5.28)
SODIUM SERPL-SCNC: 139 MMOL/L (ref 134–145)
WBC NRBC COR # BLD: 6.57 10*3/MM3 (ref 3.4–10.8)

## 2021-12-02 PROCEDURE — 80053 COMPREHEN METABOLIC PANEL: CPT

## 2021-12-02 PROCEDURE — 85025 COMPLETE CBC W/AUTO DIFF WBC: CPT

## 2021-12-02 PROCEDURE — 36415 COLL VENOUS BLD VENIPUNCTURE: CPT

## 2021-12-02 PROCEDURE — 99213 OFFICE O/P EST LOW 20 MIN: CPT | Performed by: INTERNAL MEDICINE

## 2021-12-02 NOTE — PROGRESS NOTES
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 female with the above-noted history of LCIS of the left breast in 2000. She had lumpectomy and took 5 years of tamoxifen as breast cancer prevention. She finished her tamoxifen 16 years ago . She is here today for an annual followup. Her mammogram was performed 7/16/2021 with no suspicious findings.    It is been recommended that due to her history and the fact that she has dense breasts that she undergo 3-D mammogram yearly.    She is up to date on her colonoscopies.  She had normal bone density on DEXA scan from 10/22/2020  History of Present Illness     Past Medical History, Past Surgical History, Social History, Family History have been reviewed and are without significant changes except as mentioned.    Review of Systems   Constitutional: Negative for activity change, appetite change, fatigue, fever and unexpected weight change.   HENT: Negative for hearing loss, nosebleeds, trouble swallowing and voice change.    Eyes: Negative for visual disturbance.   Respiratory: Negative for cough, shortness of breath and wheezing.    Cardiovascular: Negative for chest pain and palpitations.   Gastrointestinal: Negative for abdominal pain, diarrhea, nausea and vomiting.   Genitourinary: Negative for difficulty urinating, frequency, hematuria and urgency.   Musculoskeletal: Negative for back pain and neck pain.   Skin: Negative for rash.   Neurological: Negative for dizziness, seizures, syncope and headaches.   Hematological: Negative for adenopathy. Does not bruise/bleed easily.   Psychiatric/Behavioral: Negative for behavioral problems. The patient is not nervous/anxious.      A comprehensive 14 point review of systems was performed and was negative except as mentioned.    Medications:  The current medication list was reviewed in the EMR    ALLERGIES:   "No Known Allergies    Objective      Vitals:    12/02/21 1535   BP: 124/65   Pulse: 69   Resp: 16   Temp: 98 °F (36.7 °C)   TempSrc: Oral   SpO2: 99%   Weight: 52.6 kg (116 lb)   Height: 162 cm (63.78\")  Comment: new ht   PainSc: 0-No pain     Current Status 12/2/2021   ECOG score 0       Physical Exam   Constitutional: She is oriented to person, place, and time. She appears well-developed. No distress.   HENT:   Head: Normocephalic.   Eyes: Pupils are equal, round, and reactive to light.   Neck: No JVD present. No thyromegaly present.   Cardiovascular: Normal rate and regular rhythm. Exam reveals no gallop and no friction rub.   No murmur heard.  Pulmonary/Chest: Effort normal and breath sounds normal. She has no wheezes. She has no rales. Right breast exhibits no mass, no nipple discharge and no skin change. Left breast exhibits no mass, no nipple discharge and no skin change.   Abdominal: Soft. Bowel sounds are normal. She exhibits no distension and no mass. There is no abdominal tenderness.   Musculoskeletal: No deformity.   Neurological: She is alert and oriented to person, place, and time. She has normal reflexes. No cranial nerve deficit.   Skin: Skin is dry. No rash noted.   Psychiatric: Judgment normal.     I have reexamined the patient and the results are consistent with the previously documented exam. Michael James MD       RECENT LABS:  Hematology WBC   Date Value Ref Range Status   12/02/2021 6.57 3.40 - 10.80 10*3/mm3 Final   04/12/2021 4.54 3.40 - 10.80 10*3/mm3 Final     RBC   Date Value Ref Range Status   12/02/2021 4.84 3.77 - 5.28 10*6/mm3 Final   04/12/2021 4.93 3.77 - 5.28 10*6/mm3 Final     Hemoglobin   Date Value Ref Range Status   12/02/2021 14.4 12.0 - 15.9 g/dL Final     Hematocrit   Date Value Ref Range Status   12/02/2021 44.1 34.0 - 46.6 % Final     Platelets   Date Value Ref Range Status   12/02/2021 185 140 - 450 10*3/mm3 Final          SCREENING MAMMOGRAM WITH R2 COMPUTERIZED " ASSISTED DETECTION AND DIGITAL  TOMOSYNTHESIS 7/16/2021  IMPRESSION:  1. There is no evidence for malignancy or significant change in either  breast. Routine followup mammography is recommended.     BI-RADS category 1: Negative.     Assessment/Plan     ASSESSMENT:   1. 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 She continues to do well with no evidence of malignancy on exam or on her mammograms.     PLAN:   1. The patient continues to do well now over 20 years from her original diagnosis.  She will return to see us again in one year for routine followup.  2. She will be due for annual screening mammogram again in July 2022 12/2/2021      CC:

## 2022-04-14 ENCOUNTER — OFFICE VISIT (OUTPATIENT)
Dept: INTERNAL MEDICINE | Facility: CLINIC | Age: 72
End: 2022-04-14

## 2022-04-14 VITALS
HEART RATE: 77 BPM | WEIGHT: 118 LBS | TEMPERATURE: 98 F | SYSTOLIC BLOOD PRESSURE: 120 MMHG | OXYGEN SATURATION: 99 % | DIASTOLIC BLOOD PRESSURE: 80 MMHG | HEIGHT: 63 IN | BODY MASS INDEX: 20.91 KG/M2

## 2022-04-14 DIAGNOSIS — L70.9 ACNE, UNSPECIFIED ACNE TYPE: Chronic | ICD-10-CM

## 2022-04-14 DIAGNOSIS — J30.2 SEASONAL ALLERGIC RHINITIS, UNSPECIFIED TRIGGER: Chronic | ICD-10-CM

## 2022-04-14 DIAGNOSIS — R59.1 LYMPHADENOPATHY: Chronic | ICD-10-CM

## 2022-04-14 DIAGNOSIS — Z76.89 ENCOUNTER TO ESTABLISH CARE: Primary | ICD-10-CM

## 2022-04-14 DIAGNOSIS — E78.5 HYPERLIPIDEMIA, UNSPECIFIED HYPERLIPIDEMIA TYPE: Chronic | ICD-10-CM

## 2022-04-14 PROBLEM — H65.193 ACUTE EFFUSION OF BOTH MIDDLE EARS: Status: ACTIVE | Noted: 2022-04-14

## 2022-04-14 PROCEDURE — 99214 OFFICE O/P EST MOD 30 MIN: CPT | Performed by: NURSE PRACTITIONER

## 2022-04-14 NOTE — ASSESSMENT & PLAN NOTE
CT of soft tissues in neck ordered.   Labs today.   Recommended to f/u with Dr James as soon as patient can.

## 2022-04-14 NOTE — PROGRESS NOTES
"Chief Complaint  Establish Care and Mass (Located on right side of neck)    Subjective          Mag Palomino presents to [facility]  History of Present Illness  This is a 71 y/o female presenting to office to establish care and for complaints of right neck lump. Patient reports a swollen lymph node to right neck with lymphadenopathy. Patient reports it is painless in nature and not tender upon palpation. Patient is unsure of when she initially discovered this but it has been at least 1 month since discovery. Patient denies any fevers or night sweats. Patient reports no history of skin rashes. Patient does have prior history of breast cancer in 2005 and a sister that has NHL. Patient denies any pruritis. Patient reports recent Covid in January 2022. Patient reports she is unaware if it has grown in size.     Patient is currently . Patient has 2 children. Patient is currently retired.     Patient reports regular exercise. Patient reports following healthy diet.     Patient does follow with Dr. James-- hx of breast cancer in 2005. Currently in remission.     Patient gets colonoscopy testing every 5 years for prior discovery of polyp with Dr. York. Patient currently is not receiving pap smear testing. No hx of hysterectomy. Denies any abnormal bleeding or discharge.     Objective   Vital Signs:   /80 (BP Location: Left arm, Patient Position: Sitting, Cuff Size: Adult)   Pulse 77   Temp 98 °F (36.7 °C) (Temporal)   Ht 160 cm (63\")   Wt 53.5 kg (118 lb)   SpO2 99%   BMI 20.90 kg/m²     BMI is within normal parameters. No follow-up required.      Physical Exam  Vitals and nursing note reviewed.   Constitutional:       Appearance: Normal appearance. She is normal weight.   HENT:      Head: Normocephalic and atraumatic.      Right Ear: A middle ear effusion is present.      Left Ear: A middle ear effusion is present.   Eyes:      Conjunctiva/sclera: Conjunctivae " normal.      Pupils: Pupils are equal, round, and reactive to light.   Cardiovascular:      Rate and Rhythm: Normal rate and regular rhythm.      Pulses: Normal pulses.      Heart sounds: Normal heart sounds. No murmur heard.    No friction rub. No gallop.   Pulmonary:      Effort: Pulmonary effort is normal. No respiratory distress.      Breath sounds: Normal breath sounds. No stridor. No wheezing, rhonchi or rales.   Abdominal:      General: Bowel sounds are normal. There is no distension.      Palpations: Abdomen is soft.      Tenderness: There is no abdominal tenderness.   Musculoskeletal:         General: Swelling present.      Cervical back: Normal range of motion and neck supple.   Lymphadenopathy:      Cervical: Cervical adenopathy present.      Right cervical: Superficial cervical adenopathy present.      Comments: +pea sized swollen lymph node to right cervical region of neck   Skin:     General: Skin is warm and dry.   Neurological:      General: No focal deficit present.      Mental Status: She is alert and oriented to person, place, and time. Mental status is at baseline.   Psychiatric:         Mood and Affect: Mood normal.         Behavior: Behavior normal.         Thought Content: Thought content normal.         Judgment: Judgment normal.        Result Review :   The following data was reviewed by: LIZBETH Navarro on 04/14/2022:  Common labs    Common Labsle 10/28/21 10/28/21 12/2/21 12/2/21    0000 0000 1525 1525   Glucose 92   95   BUN 15   21 (A)   Creatinine 0.68   0.82   eGFR Non  Am 89   69   eGFR African Am 102      Sodium 142   139   Potassium 4.4   4.4   Chloride 104   101   Calcium 9.4   9.9   Total Protein 7.0      Albumin 4.8   4.80   Total Bilirubin 0.6   0.5   Alkaline Phosphatase 52   54   AST (SGOT) 26   23   ALT (SGPT) 22   19   WBC   6.57    Hemoglobin   14.4    Hematocrit   44.1    Platelets   185    Total Cholesterol  147     Triglycerides  94     HDL Cholesterol  67      LDL Cholesterol   63     (A) Abnormal value       Comments are available for some flowsheets but are not being displayed.                  Assessment and Plan    Diagnoses and all orders for this visit:    1. Encounter to establish care (Primary)  -     Comprehensive metabolic panel    2. Lymphadenopathy  Assessment & Plan:  CT of soft tissues in neck ordered.   Labs today.   Recommended to f/u with Dr James as soon as patient can.       Orders:  -     CT soft tissue neck w contrast; Future  -     CBC & Differential  -     Comprehensive metabolic panel  -     EBV Antibody Profile    3. Hyperlipidemia, unspecified hyperlipidemia type  Assessment & Plan:  Lipid abnormalities are improving with treatment.  Nutritional counseling was provided. and Pharmacotherapy as ordered.  Lipids will be reassessed in 6 months.    Orders:  -     Lipid panel    4. Acne, unspecified acne type  Assessment & Plan:  Continues on spironolactone.   Follows with Dr. Faustin with dermatology.       5. Seasonal allergic rhinitis, unspecified trigger  Assessment & Plan:  Continues on zyrtec.   Flonase PRN.         Follow Up   Return in about 6 months (around 10/14/2022) for Medicare Wellness.  Patient was given instructions and counseling regarding her condition or for health maintenance advice. Please see specific information pulled into the AVS if appropriate.

## 2022-04-15 LAB
ALBUMIN SERPL-MCNC: 5.1 G/DL (ref 3.7–4.7)
ALBUMIN/GLOB SERPL: 2.7 {RATIO} (ref 1.2–2.2)
ALP SERPL-CCNC: 57 IU/L (ref 44–121)
ALT SERPL-CCNC: 20 IU/L (ref 0–32)
AST SERPL-CCNC: 25 IU/L (ref 0–40)
BASOPHILS # BLD AUTO: 0 X10E3/UL (ref 0–0.2)
BASOPHILS NFR BLD AUTO: 1 %
BILIRUB SERPL-MCNC: 0.5 MG/DL (ref 0–1.2)
BUN SERPL-MCNC: 22 MG/DL (ref 8–27)
BUN/CREAT SERPL: 29 (ref 12–28)
CALCIUM SERPL-MCNC: 9.8 MG/DL (ref 8.7–10.3)
CHLORIDE SERPL-SCNC: 102 MMOL/L (ref 96–106)
CHOLEST SERPL-MCNC: 166 MG/DL (ref 100–199)
CO2 SERPL-SCNC: 23 MMOL/L (ref 20–29)
CREAT SERPL-MCNC: 0.76 MG/DL (ref 0.57–1)
EBV NA IGG SER IA-ACNC: 24.8 U/ML (ref 0–17.9)
EBV VCA IGG SER IA-ACNC: 78.9 U/ML (ref 0–17.9)
EBV VCA IGM SER IA-ACNC: <36 U/ML (ref 0–35.9)
EGFRCR SERPLBLD CKD-EPI 2021: 84 ML/MIN/1.73
EOSINOPHIL # BLD AUTO: 0 X10E3/UL (ref 0–0.4)
EOSINOPHIL NFR BLD AUTO: 1 %
ERYTHROCYTE [DISTWIDTH] IN BLOOD BY AUTOMATED COUNT: 12.7 % (ref 11.7–15.4)
GLOBULIN SER CALC-MCNC: 1.9 G/DL (ref 1.5–4.5)
GLUCOSE SERPL-MCNC: 91 MG/DL (ref 65–99)
HCT VFR BLD AUTO: 43.4 % (ref 34–46.6)
HDLC SERPL-MCNC: 71 MG/DL
HGB BLD-MCNC: 14.3 G/DL (ref 11.1–15.9)
IMM GRANULOCYTES # BLD AUTO: 0 X10E3/UL (ref 0–0.1)
IMM GRANULOCYTES NFR BLD AUTO: 0 %
LDLC SERPL CALC-MCNC: 79 MG/DL (ref 0–99)
LYMPHOCYTES # BLD AUTO: 1.1 X10E3/UL (ref 0.7–3.1)
LYMPHOCYTES NFR BLD AUTO: 21 %
MCH RBC QN AUTO: 29.4 PG (ref 26.6–33)
MCHC RBC AUTO-ENTMCNC: 32.9 G/DL (ref 31.5–35.7)
MCV RBC AUTO: 89 FL (ref 79–97)
MONOCYTES # BLD AUTO: 0.3 X10E3/UL (ref 0.1–0.9)
MONOCYTES NFR BLD AUTO: 6 %
NEUTROPHILS # BLD AUTO: 3.7 X10E3/UL (ref 1.4–7)
NEUTROPHILS NFR BLD AUTO: 71 %
PLATELET # BLD AUTO: 196 X10E3/UL (ref 150–450)
POTASSIUM SERPL-SCNC: 4.6 MMOL/L (ref 3.5–5.2)
PROT SERPL-MCNC: 7 G/DL (ref 6–8.5)
RBC # BLD AUTO: 4.87 X10E6/UL (ref 3.77–5.28)
SERVICE CMNT-IMP: ABNORMAL
SODIUM SERPL-SCNC: 140 MMOL/L (ref 134–144)
TRIGL SERPL-MCNC: 86 MG/DL (ref 0–149)
VLDLC SERPL CALC-MCNC: 16 MG/DL (ref 5–40)
WBC # BLD AUTO: 5.2 X10E3/UL (ref 3.4–10.8)

## 2022-04-18 NOTE — PROGRESS NOTES
Please let patient know:    Overall labs stable; EBV labs show evidence of previous infection-- cannot say when this was, but at some point we did have this. Would recommend continuing with CT scan-- will f/u with results when available.

## 2022-04-20 ENCOUNTER — TELEPHONE (OUTPATIENT)
Dept: ONCOLOGY | Facility: CLINIC | Age: 72
End: 2022-04-20

## 2022-04-20 NOTE — TELEPHONE ENCOUNTER
Returned call to patient who is calling because she went to see her PCP and she has an enlarged lymph node on the right side of her neck.  Her PCP has scheduled her to have a CT scan of the soft tissues of the right side of her neck on 5/20/2022.  Per PCP notes, they would like patient to be seen sooner that 12/2022.  Please advise if ok to wait till after CT Scan or does she need to be sooner.

## 2022-04-20 NOTE — TELEPHONE ENCOUNTER
Pt wants to let MD know that she has an enlarged lymph node in neck and her PCP is ordering a CT scan

## 2022-05-10 ENCOUNTER — IMMUNIZATION (OUTPATIENT)
Dept: VACCINE CLINIC | Facility: HOSPITAL | Age: 72
End: 2022-05-10

## 2022-05-10 DIAGNOSIS — Z23 NEED FOR VACCINATION: Primary | ICD-10-CM

## 2022-05-10 PROCEDURE — 91305 HC SARSCOV2 VAC 30 MCG TRS-SUCR PFIZER: CPT | Performed by: INTERNAL MEDICINE

## 2022-05-10 PROCEDURE — 0054A HC ADM SARSCV2 30MCG TRS-SUCR BOOSTER: CPT | Performed by: INTERNAL MEDICINE

## 2022-05-20 ENCOUNTER — HOSPITAL ENCOUNTER (OUTPATIENT)
Dept: CT IMAGING | Facility: HOSPITAL | Age: 72
Discharge: HOME | End: 2022-05-20
Admitting: NURSE PRACTITIONER

## 2022-05-20 DIAGNOSIS — R59.1 LYMPHADENOPATHY: Chronic | ICD-10-CM

## 2022-05-20 DIAGNOSIS — E07.9 THYROID MASS: Primary | ICD-10-CM

## 2022-05-20 DIAGNOSIS — R59.9 ENLARGED LYMPH NODES: ICD-10-CM

## 2022-05-20 LAB — CREAT BLDA-MCNC: 0.8 MG/DL (ref 0.6–1.3)

## 2022-05-20 PROCEDURE — 25010000002 IOPAMIDOL 61 % SOLUTION: Performed by: NURSE PRACTITIONER

## 2022-05-20 PROCEDURE — 70491 CT SOFT TISSUE NECK W/DYE: CPT

## 2022-05-20 PROCEDURE — 82565 ASSAY OF CREATININE: CPT

## 2022-05-20 RX ADMIN — IOPAMIDOL 85 ML: 612 INJECTION, SOLUTION INTRAVENOUS at 08:11

## 2022-05-20 NOTE — PROGRESS NOTES
Spoke with patient regarding CT neck results on 5/20/22 at 12:45 PM. Patient reports she has not had any systemic illness or recent viral illness noted. Patient did have covid 19 back in December 2021. Patient is agreeable to treatment plan. CT abd/pelvis/chest placed. Thyroid US order placed-- went ahead and placed urgent referral to dr. atwood's office.

## 2022-05-26 ENCOUNTER — OFFICE VISIT (OUTPATIENT)
Dept: ONCOLOGY | Facility: CLINIC | Age: 72
End: 2022-05-26

## 2022-05-26 ENCOUNTER — HOSPITAL ENCOUNTER (OUTPATIENT)
Dept: ULTRASOUND IMAGING | Facility: HOSPITAL | Age: 72
Discharge: HOME | End: 2022-05-26
Admitting: NURSE PRACTITIONER

## 2022-05-26 ENCOUNTER — LAB (OUTPATIENT)
Dept: LAB | Facility: HOSPITAL | Age: 72
End: 2022-05-26

## 2022-05-26 VITALS
OXYGEN SATURATION: 99 % | HEIGHT: 63 IN | HEART RATE: 82 BPM | TEMPERATURE: 97.5 F | WEIGHT: 119.8 LBS | DIASTOLIC BLOOD PRESSURE: 80 MMHG | BODY MASS INDEX: 21.23 KG/M2 | SYSTOLIC BLOOD PRESSURE: 150 MMHG | RESPIRATION RATE: 16 BRPM

## 2022-05-26 DIAGNOSIS — D05.02 NEOPLASM OF LEFT BREAST, PRIMARY TUMOR STAGING CATEGORY TIS: LOBULAR CARCINOMA IN SITU (LCIS): Primary | ICD-10-CM

## 2022-05-26 DIAGNOSIS — D05.02 NEOPLASM OF LEFT BREAST, PRIMARY TUMOR STAGING CATEGORY TIS: LOBULAR CARCINOMA IN SITU (LCIS): ICD-10-CM

## 2022-05-26 DIAGNOSIS — Z12.31 ENCOUNTER FOR SCREENING MAMMOGRAM FOR MALIGNANT NEOPLASM OF BREAST: ICD-10-CM

## 2022-05-26 DIAGNOSIS — E07.9 THYROID MASS: ICD-10-CM

## 2022-05-26 DIAGNOSIS — R59.1 LYMPHADENOPATHY: ICD-10-CM

## 2022-05-26 LAB
ALBUMIN SERPL-MCNC: 4.7 G/DL (ref 3.5–5.2)
ALBUMIN/GLOB SERPL: 2 G/DL (ref 1.1–2.4)
ALP SERPL-CCNC: 52 U/L (ref 38–116)
ALT SERPL W P-5'-P-CCNC: 19 U/L (ref 0–33)
ANION GAP SERPL CALCULATED.3IONS-SCNC: 10.5 MMOL/L (ref 5–15)
AST SERPL-CCNC: 23 U/L (ref 0–32)
BASOPHILS # BLD AUTO: 0.03 10*3/MM3 (ref 0–0.2)
BASOPHILS NFR BLD AUTO: 0.4 % (ref 0–1.5)
BILIRUB SERPL-MCNC: 0.4 MG/DL (ref 0.2–1.2)
BUN SERPL-MCNC: 21 MG/DL (ref 6–20)
BUN/CREAT SERPL: 26.3 (ref 7.3–30)
CALCIUM SPEC-SCNC: 10.1 MG/DL (ref 8.5–10.2)
CHLORIDE SERPL-SCNC: 105 MMOL/L (ref 98–107)
CO2 SERPL-SCNC: 26.5 MMOL/L (ref 22–29)
CREAT SERPL-MCNC: 0.8 MG/DL (ref 0.6–1.1)
DEPRECATED RDW RBC AUTO: 42.5 FL (ref 37–54)
EGFRCR SERPLBLD CKD-EPI 2021: 78.9 ML/MIN/1.73
EOSINOPHIL # BLD AUTO: 0.04 10*3/MM3 (ref 0–0.4)
EOSINOPHIL NFR BLD AUTO: 0.6 % (ref 0.3–6.2)
ERYTHROCYTE [DISTWIDTH] IN BLOOD BY AUTOMATED COUNT: 12.7 % (ref 12.3–15.4)
GLOBULIN UR ELPH-MCNC: 2.3 GM/DL (ref 1.8–3.5)
GLUCOSE SERPL-MCNC: 101 MG/DL (ref 74–124)
HCT VFR BLD AUTO: 41.3 % (ref 34–46.6)
HGB BLD-MCNC: 13.6 G/DL (ref 12–15.9)
IMM GRANULOCYTES # BLD AUTO: 0.02 10*3/MM3 (ref 0–0.05)
IMM GRANULOCYTES NFR BLD AUTO: 0.3 % (ref 0–0.5)
LYMPHOCYTES # BLD AUTO: 1.93 10*3/MM3 (ref 0.7–3.1)
LYMPHOCYTES NFR BLD AUTO: 27.1 % (ref 19.6–45.3)
MCH RBC QN AUTO: 30 PG (ref 26.6–33)
MCHC RBC AUTO-ENTMCNC: 32.9 G/DL (ref 31.5–35.7)
MCV RBC AUTO: 91.2 FL (ref 79–97)
MONOCYTES # BLD AUTO: 0.51 10*3/MM3 (ref 0.1–0.9)
MONOCYTES NFR BLD AUTO: 7.2 % (ref 5–12)
NEUTROPHILS NFR BLD AUTO: 4.6 10*3/MM3 (ref 1.7–7)
NEUTROPHILS NFR BLD AUTO: 64.4 % (ref 42.7–76)
NRBC BLD AUTO-RTO: 0 /100 WBC (ref 0–0.2)
PLATELET # BLD AUTO: 206 10*3/MM3 (ref 140–450)
PMV BLD AUTO: 9.5 FL (ref 6–12)
POTASSIUM SERPL-SCNC: 4.6 MMOL/L (ref 3.5–4.7)
PROT SERPL-MCNC: 7 G/DL (ref 6.3–8)
RBC # BLD AUTO: 4.53 10*6/MM3 (ref 3.77–5.28)
SODIUM SERPL-SCNC: 142 MMOL/L (ref 134–145)
WBC NRBC COR # BLD: 7.13 10*3/MM3 (ref 3.4–10.8)

## 2022-05-26 PROCEDURE — 80053 COMPREHEN METABOLIC PANEL: CPT

## 2022-05-26 PROCEDURE — 99214 OFFICE O/P EST MOD 30 MIN: CPT | Performed by: INTERNAL MEDICINE

## 2022-05-26 PROCEDURE — 76536 US EXAM OF HEAD AND NECK: CPT

## 2022-05-26 PROCEDURE — 36415 COLL VENOUS BLD VENIPUNCTURE: CPT

## 2022-05-26 PROCEDURE — 85025 COMPLETE CBC W/AUTO DIFF WBC: CPT

## 2022-05-26 NOTE — PROGRESS NOTES
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 female with the above-noted history of LCIS of the left breast in 2000. She had lumpectomy and took 5 years of tamoxifen as breast cancer prevention. She finished her tamoxifen 16 years ago .  She had been seeing us annually in December but she had a recent visit to her primary care office for a palpable small lymph node in the right neck.  This led to a CT scan of the neck which showed some scattered lymphadenopathy and a thyroid mass.  Because of the scattered lymphadenopathy the radiologist recommended CT scans of the chest abdomen pelvis to evaluate for lymphoproliferative disorder.  The scans have been scheduled but are not due to be performed until July.    She underwent a thyroid ultrasound earlier this morning but the report is pending.  Depending on that result she certainly may need an FNA and referral to ENT.    Her next mammogram will be due in July.  It is been recommended that due to her history and the fact that she has dense breasts that she undergo 3-D mammogram yearly.    She is up to date on her colonoscopies.  She had normal bone density on DEXA scan from 10/22/2020.    We did not feel any significant pathologic lymphadenopathy on her physical exam today.  She reports that she is feeling well without any fevers chills night sweats or unexplained weight loss.  She and her  did develop COVID at the first of the year.  She seems to be fully recovered.  History of Present Illness     Past Medical History, Past Surgical History, Social History, Family History have been reviewed and are without significant changes except as mentioned.    Review of Systems   Constitutional: Negative for activity change, appetite change, fatigue, fever and unexpected weight change.   HENT: Negative for hearing loss, nosebleeds, trouble swallowing  "and voice change.    Eyes: Negative for visual disturbance.   Respiratory: Negative for cough, shortness of breath and wheezing.    Cardiovascular: Negative for chest pain and palpitations.   Gastrointestinal: Negative for abdominal pain, diarrhea, nausea and vomiting.   Genitourinary: Negative for difficulty urinating, frequency, hematuria and urgency.   Musculoskeletal: Negative for back pain and neck pain.   Skin: Negative for rash.   Neurological: Negative for dizziness, seizures, syncope and headaches.   Hematological: Negative for adenopathy. Does not bruise/bleed easily.   Psychiatric/Behavioral: Negative for behavioral problems. The patient is not nervous/anxious.      A comprehensive 14 point review of systems was performed and was negative except as mentioned.    Medications:  The current medication list was reviewed in the EMR    ALLERGIES:  No Known Allergies    Objective      Vitals:    05/26/22 1122   BP: 150/80   Pulse: 82   Resp: 16   Temp: 97.5 °F (36.4 °C)   TempSrc: Temporal   SpO2: 99%   Weight: 54.3 kg (119 lb 12.8 oz)   Height: 160 cm (62.99\")   PainSc: 0-No pain     Current Status 12/2/2021   ECOG score 0       Physical Exam   Constitutional: She is oriented to person, place, and time. She appears well-developed. No distress.   HENT:   Head: Normocephalic.   Eyes: Pupils are equal, round, and reactive to light.   Neck: No JVD present. No thyromegaly present.   Cardiovascular: Normal rate and regular rhythm. Exam reveals no gallop and no friction rub.   No murmur heard.  Pulmonary/Chest: Effort normal and breath sounds normal. She has no wheezes. She has no rales. Right breast exhibits no mass, no nipple discharge and no skin change. Left breast exhibits no mass, no nipple discharge and no skin change.   Abdominal: Soft. Bowel sounds are normal. She exhibits no distension and no mass. There is no abdominal tenderness.   Musculoskeletal: No deformity.   Neurological: She is alert and oriented to " person, place, and time. She has normal reflexes. No cranial nerve deficit.   Skin: Skin is dry. No rash noted.   Psychiatric: Judgment normal.     I have reexamined the patient and the results are consistent with the previously documented exam. Michael James MD       RECENT LABS:  Hematology WBC   Date Value Ref Range Status   05/26/2022 7.13 3.40 - 10.80 10*3/mm3 Final   04/14/2022 5.2 3.4 - 10.8 x10E3/uL Final     RBC   Date Value Ref Range Status   05/26/2022 4.53 3.77 - 5.28 10*6/mm3 Final   04/14/2022 4.87 3.77 - 5.28 x10E6/uL Final     Hemoglobin   Date Value Ref Range Status   05/26/2022 13.6 12.0 - 15.9 g/dL Final     Hematocrit   Date Value Ref Range Status   05/26/2022 41.3 34.0 - 46.6 % Final     Platelets   Date Value Ref Range Status   05/26/2022 206 140 - 450 10*3/mm3 Final          CONTRAST-ENHANCED CT SCAN OF THE NECK 05/20/2022  IMPRESSION:  1. There is cervical spondylosis as described above with uncovertebral  joint spurs mild to moderately narrowing the left neural foramen at C3-4  and there is posterior spurring and uncovertebral joint hypertrophy  contributing to mild canal and mild-to-moderate left and moderate right  bony foraminal narrowing at C5-C6 and there is mild canal and mild left  and mild-to-moderate right bony foraminal narrowing at C6-7.  2. There is a mass replacing the majority of the mid and inferior right  lobe of the thyroid that measures 2.7 x 2.3 x 4.4 cm in size.  It has  some areas of central necrosis and inferiorly it has some cystic change.   It is an indeterminate thyroid mass and I recommend a thyroid  ultrasound and the mass could be needle aspirated if clinically  indicated.  3. There are multiple small lymph nodes tracking up and down the  posterior cervical triangles of the neck bilaterally, right greater than  left.  They are more numerous than typically seen, but none of them  appear significantly enlarged.  The single largest right posterior  cervical  triangle is deep to the BB placed on the skin at the site of  the palpable abnormality and measures 9 x 6 x 11 mm in size and is  borderline in size.  Furthermore, there are multiple small  supraclavicular nodes and retropectoral nodes and superior axillary  nodes more numerous than typically seen and some of the retropectoral  and high axillary nodes are borderline pathologic by size as well.  The  lymphadenopathy in the retropectoral and high axillary regions could be  secondary to low-grade lymphoproliferative process or reactive nodes  from systemic illness.  I recommend a chest, abdomen and pelvis CT to  further evaluate the nodes and clinical correlation with the findings.  The remainder of the neck CT is unremarkable.      SCREENING MAMMOGRAM WITH R2 COMPUTERIZED ASSISTED DETECTION AND DIGITAL  TOMOSYNTHESIS 7/16/2021  IMPRESSION:  1. There is no evidence for malignancy or significant change in either  breast. Routine followup mammography is recommended.     BI-RADS category 1: Negative.     Assessment & Plan     ASSESSMENT:   1. 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 She continues to do well with no evidence of malignancy on exam or on her mammograms.   2.  Mild scattered lymphadenopathy in the neck noted on CT scan of the neck from 5/20/2022 as noted above.  We did not appreciate any significant pathologic lymphadenopathy on her physical exam today.  CT scans of the chest abdomen and pelvis have been ordered.  3.  Right lobe thyroid mass.  Patient had an ultrasound this morning but the report is pending.  My suspicion is she probably will need an FNA and referral to ENT.    PLAN:   1. We will schedule the patient's annual screening mammogram July 2022  2. I  reassured the patient that we do not feel any worrisome palpable lymphadenopathy but we do agree with pursuing the CT scans of the chest abdomen and pelvis as noted  above.  These are currently scheduled in July.   3. We will await the report from the thyroid ultrasound earlier today but most likely she will need an FNA and referral to ENT.  4. We will schedule her routine follow-up again in our office in 6 months and from there we will likely be getting back to an annual follow-up schedule.  5. We will also review the results of the CT scans of the chest abdomen pelvis when they become available.  If the patient does have any significant lymphadenopathy noted on the scans we could get her back in sooner to discuss possible biopsy and PET scan if necessary.            5/26/2022      CC:

## 2022-05-27 NOTE — PROGRESS NOTES
Maribeth-- Can we please make sure patient is getting into dr. Whitley's asap.   MA-- please let patient know we need to proceed with getting her to ENT for a biopsy on the nodule found.

## 2022-06-09 ENCOUNTER — TRANSCRIBE ORDERS (OUTPATIENT)
Dept: ADMINISTRATIVE | Facility: HOSPITAL | Age: 72
End: 2022-06-09

## 2022-06-09 DIAGNOSIS — E04.1 RIGHT THYROID NODULE: Primary | ICD-10-CM

## 2022-06-16 ENCOUNTER — TRANSCRIBE ORDERS (OUTPATIENT)
Dept: ADMINISTRATIVE | Facility: HOSPITAL | Age: 72
End: 2022-06-16

## 2022-06-30 ENCOUNTER — APPOINTMENT (OUTPATIENT)
Dept: ULTRASOUND IMAGING | Facility: HOSPITAL | Age: 72
End: 2022-06-30

## 2022-07-07 ENCOUNTER — HOSPITAL ENCOUNTER (OUTPATIENT)
Dept: ULTRASOUND IMAGING | Facility: HOSPITAL | Age: 72
Discharge: HOME | End: 2022-07-07
Admitting: OTOLARYNGOLOGY

## 2022-07-07 ENCOUNTER — HOSPITAL ENCOUNTER (OUTPATIENT)
Dept: ULTRASOUND IMAGING | Facility: HOSPITAL | Age: 72
Discharge: HOME | End: 2022-07-07

## 2022-07-07 DIAGNOSIS — E04.1 RIGHT THYROID NODULE: ICD-10-CM

## 2022-07-07 PROCEDURE — 88173 CYTOPATH EVAL FNA REPORT: CPT | Performed by: OTOLARYNGOLOGY

## 2022-07-07 PROCEDURE — 0 LIDOCAINE 1 % SOLUTION: Performed by: OTOLARYNGOLOGY

## 2022-07-07 PROCEDURE — 88305 TISSUE EXAM BY PATHOLOGIST: CPT | Performed by: OTOLARYNGOLOGY

## 2022-07-07 RX ORDER — LIDOCAINE HYDROCHLORIDE 10 MG/ML
5 INJECTION, SOLUTION INFILTRATION; PERINEURAL ONCE
Status: COMPLETED | OUTPATIENT
Start: 2022-07-07 | End: 2022-07-07

## 2022-07-07 RX ADMIN — LIDOCAINE HYDROCHLORIDE 5 ML: 10 INJECTION, SOLUTION INFILTRATION; PERINEURAL at 09:30

## 2022-07-08 ENCOUNTER — HOSPITAL ENCOUNTER (OUTPATIENT)
Dept: CT IMAGING | Facility: HOSPITAL | Age: 72
Discharge: HOME | End: 2022-07-08
Admitting: NURSE PRACTITIONER

## 2022-07-08 DIAGNOSIS — R59.9 ENLARGED LYMPH NODES: ICD-10-CM

## 2022-07-08 LAB — CREAT BLDA-MCNC: 0.8 MG/DL (ref 0.6–1.3)

## 2022-07-08 PROCEDURE — 74177 CT ABD & PELVIS W/CONTRAST: CPT

## 2022-07-08 PROCEDURE — 0 DIATRIZOATE MEGLUMINE & SODIUM PER 1 ML: Performed by: NURSE PRACTITIONER

## 2022-07-08 PROCEDURE — 25010000002 IOPAMIDOL 61 % SOLUTION: Performed by: NURSE PRACTITIONER

## 2022-07-08 PROCEDURE — 82565 ASSAY OF CREATININE: CPT

## 2022-07-08 PROCEDURE — 71260 CT THORAX DX C+: CPT

## 2022-07-08 RX ADMIN — IOPAMIDOL 85 ML: 612 INJECTION, SOLUTION INTRAVENOUS at 13:30

## 2022-07-08 RX ADMIN — DIATRIZOATE MEGLUMINE AND DIATRIZOATE SODIUM 30 ML: 600; 100 SOLUTION ORAL; RECTAL at 13:29

## 2022-07-12 DIAGNOSIS — R59.9 ADENOPATHY: Primary | ICD-10-CM

## 2022-07-13 ENCOUNTER — TELEPHONE (OUTPATIENT)
Dept: ONCOLOGY | Facility: CLINIC | Age: 72
End: 2022-07-13

## 2022-07-13 DIAGNOSIS — D05.02 NEOPLASM OF LEFT BREAST, PRIMARY TUMOR STAGING CATEGORY TIS: LOBULAR CARCINOMA IN SITU (LCIS): Primary | ICD-10-CM

## 2022-07-13 DIAGNOSIS — R59.1 LYMPHADENOPATHY: ICD-10-CM

## 2022-07-13 NOTE — TELEPHONE ENCOUNTER
----- Message from Jacqui Mendoza RN sent at 7/13/2022  7:59 AM EDT -----  Regarding: RE: CT lymph node biopsy  Good morning LIZBETH Trivedi has changed the order. Thank you!  ----- Message -----  From: Shikha Saucedo RegSched Rep  Sent: 7/12/2022   2:18 PM EDT  To: Jacqui Mendoza RN  Subject: RE: CT lymph node biopsy                         I called over to IR scheduling, and they checked with their provider and he would like to get this order changed to an ultrasound guided biopsy. Would you mind fixing it, and then I can get it scheduled? Thanks  ----- Message -----  From: Jacqui Mendoza RN  Sent: 7/12/2022  11:47 AM EDT  To: Hamlet Stafford  Subject: RE: CT lymph node biopsy                         On [facility] order, it says expected 7/17, can we schedule it prior to that date? (just curious/learning) or should it be scheduled on 7/17 onwards?    We can do the follow up, at least 3 days after scan (per Maribell CHEN, results are available 3days after)    Thank you!  ----- Message -----  From: Shikha Saucedo RegSched Rep  Sent: 7/12/2022  11:20 AM EDT  To: Jacqui Mendoza RN  Subject: CT lymph node biopsy                             Hey, when does this patient's biopsy need to be scheduled for, and when should her follow up with Dr. James be after that? Thanks

## 2022-07-13 NOTE — TELEPHONE ENCOUNTER
Spoke with patient and made her aware of Dr James's recommendation to see her in 3-4 weeks. Informed her of the appointment 8/10 at 10:10a for labs and 10:40a to see MD. Pt v/u. Orders placed.

## 2022-07-13 NOTE — TELEPHONE ENCOUNTER
----- Message from Michael James MD sent at 7/13/2022 10:02 AM EDT -----  Please schedule an appointment for a 2 unit MD visit with lab (CBC, CMP, SARAH) with me in 3 to 4 weeks.    Thanks

## 2022-07-14 NOTE — PROGRESS NOTES
07/22/22 0001   Pre-Procedure Phone Call   Procedure Time Verified Yes   Arrival Time 1130   Procedure Location Verified Yes   Medical History Reviewed No   NPO Status Reinforced Yes   Ride and Caregiver Arranged N/A   Patient Knows to Bring Current Medications   (No changes in medications since last reviewed.)   Bring Outside Films Requested   (CT chest 7/8/22 in PACS, US FNA Thyroid 7/7/22 in PACS)

## 2022-07-22 ENCOUNTER — HOSPITAL ENCOUNTER (OUTPATIENT)
Dept: ULTRASOUND IMAGING | Facility: HOSPITAL | Age: 72
Discharge: HOME | End: 2022-07-22
Admitting: NURSE PRACTITIONER

## 2022-07-22 VITALS
DIASTOLIC BLOOD PRESSURE: 71 MMHG | RESPIRATION RATE: 16 BRPM | OXYGEN SATURATION: 100 % | HEART RATE: 67 BPM | HEIGHT: 63 IN | WEIGHT: 118 LBS | TEMPERATURE: 98 F | BODY MASS INDEX: 20.91 KG/M2 | SYSTOLIC BLOOD PRESSURE: 140 MMHG

## 2022-07-22 DIAGNOSIS — R59.9 ADENOPATHY: ICD-10-CM

## 2022-07-22 PROCEDURE — 88305 TISSUE EXAM BY PATHOLOGIST: CPT | Performed by: NURSE PRACTITIONER

## 2022-07-22 PROCEDURE — 88185 FLOWCYTOMETRY/TC ADD-ON: CPT

## 2022-07-22 PROCEDURE — 88323 CONSLTJ&REPRT MATRL PREP SLD: CPT

## 2022-07-22 PROCEDURE — 0 LIDOCAINE 1 % SOLUTION: Performed by: RADIOLOGY

## 2022-07-22 PROCEDURE — 76942 ECHO GUIDE FOR BIOPSY: CPT

## 2022-07-22 PROCEDURE — 88360 TUMOR IMMUNOHISTOCHEM/MANUAL: CPT

## 2022-07-22 PROCEDURE — 88342 IMHCHEM/IMCYTCHM 1ST ANTB: CPT | Performed by: NURSE PRACTITIONER

## 2022-07-22 PROCEDURE — 88184 FLOWCYTOMETRY/ TC 1 MARKER: CPT

## 2022-07-22 PROCEDURE — 88341 IMHCHEM/IMCYTCHM EA ADD ANTB: CPT

## 2022-07-22 PROCEDURE — 88365 INSITU HYBRIDIZATION (FISH): CPT

## 2022-07-22 PROCEDURE — 88300 SURGICAL PATH GROSS: CPT | Performed by: NURSE PRACTITIONER

## 2022-07-22 PROCEDURE — 88341 IMHCHEM/IMCYTCHM EA ADD ANTB: CPT | Performed by: NURSE PRACTITIONER

## 2022-07-22 RX ORDER — LIDOCAINE HYDROCHLORIDE 10 MG/ML
10 INJECTION, SOLUTION INFILTRATION; PERINEURAL ONCE
Status: COMPLETED | OUTPATIENT
Start: 2022-07-22 | End: 2022-07-22

## 2022-07-22 RX ADMIN — LIDOCAINE HYDROCHLORIDE 2 ML: 10 INJECTION, SOLUTION INFILTRATION; PERINEURAL at 13:16

## 2022-07-22 NOTE — NURSING NOTE
Patient arrived in Radiology triage for lymph node biopsy bay 4. Family at bedside.  Protective goggles and mask in place with all patient interactions today

## 2022-07-22 NOTE — NURSING NOTE
Patient ambulated with  for discharge home.   Protective goggles and mask in place with all patient interactions today .

## 2022-07-22 NOTE — DISCHARGE INSTRUCTIONS
EDUCATION /DISCHARGE INSTRUCTIONS  CT/US guided biopsy:  A biopsy is a procedure done to remove tissue for further analysis.  Before images are taken to locate the target area.  Images can be obtained using ultrasound, CT or MRI.  A physician will clean your skin with antiseptic soap, place a sterile towel around the site and administer a local anesthetic to numb the area.  The physician will then insert a special needle.  Sometimes images are taken of the needle after it is inserted to ensure the needle is in the correct area to be biopsied.   A sample is obtained and sent to the laboratory for study.  Occasionally the laboratory is unable to make a diagnosis from the sample and the procedure may need to be repeated.  Within a week the radiologist will send a report to your physician.  A pathologist will also examine the tissue and send a report.    Risks of the procedure include but are not limited to:   *  Bleeding    *  Infection   *  Puncture of surrounding organs *  Death     *  Lung collapse if the biopsy is near the chest which may require insertion of a      chest tube to re-inflate the lung if severe.    Benefits of the procedure:  Using x-ray helps to locate the area that requires a biopsy. The procedure is less invasive than a surgical procedure, there are no large incisions and it does not require anesthesia.    Alternatives to the procedure:  A biopsy can be performed surgically.  Risks of a surgical biopsy include exposure to anesthesia, infection, excessive bleeding and injury to abdominal organs.  A benefit of surgical biopsy is the ability to see the area to be biopsied and remove of a larger piece of tissue.    THIS EDUCATION INFORMATION WAS REVIEWED PRIOR TO PROCEDURE AND CONSENT. Patient initials__________________Time___________________    Post Procedure:    *  Expect the biopsy site may be tender up to one week.    *  Rest today (no pushing pulling or straining).   *  Slowly increase activity  tomorrow.    *  If you received sedation do not drive for 24 hours.   *  Keep dressing clean and dry.   *  Leave dressing on puncture site for 24 hours.    *  You may shower when dressing removed.  Call your doctor if experiencing:   *  Signs of infection such as redness, swelling, excessive pain and / or foul        smelling drainage from the puncture site.   *  Chills or fever over 101 degrees (by mouth).   *  Unrelieved pain.   *  Any new or severe symptoms.   *  If experiencing sudden / severe shortness of breath or chest pain go to the       nearest emergency room.   Following the procedure:     Follow-up with the ordering physician as directed.    Continue to take other medications as directed by your physician unless    otherwise instructed.   If applicable, resume taking your blood thinners or Aspirin on ___________.    If you have any concerns please call the Radiology Nurses Desk at [phone]. 7am-10pm   You are the most important factor in your recovery.  Follow the above instructions carefully.

## 2022-07-22 NOTE — H&P
Name: Magromelia Palomino ADMIT: 2022   : 1950  PCP: Carol Jorge APRN    MRN: [id number] LOS: 0 days   AGE/SEX: 72 y.o. female  ROOM: Room/bed info not found       Chief complaint   Patient is a 72 y.o. female presents with enlarged lymph node.     Past Surgical History:  Past Surgical History:   Procedure Laterality Date   • BREAST BIOPSY     • BREAST LUMPECTOMY     •  SECTION  3/1979   • COLONOSCOPY N/A 2015    Dr. Mag York   • ENDOMETRIAL BIOPSY  2005    Normal   • PAP SMEAR  2013       Past Medical History:  Past Medical History:   Diagnosis Date   • Acne     Derm Dr. Hui   • Allergic rhinitis    • Anxiety    • Backache    • Breast cancer (HCC) 2000    Right lobular (oncology CBC) Dr. James   • Constipation    • Dizzy 2014    ENT Dr Yifan Salmeron, Normal exam (no inner ear)   • Fatigue    • Fibrocystic breast    • H/O bone density study    • H/O bone density study 10/06/2016   • H/O echocardiogram 2014    Dr De La Torre   • H/O mammogram    • Hair loss    • Headache    • History of colonic polyps    • Hyperlipidemia    • Hypertension    • Nocturia    • Palpitation    • Paresthesia of arm    • Personal history of malignant neoplasm of breast    • Piriformis syndrome        Home Medications:  (Not in a hospital admission)      Allergies:  Patient has no known allergies.    Family History:  Family History   Problem Relation Age of Onset   • Hyperlipidemia Mother    • Heart attack Father    • Alcohol abuse Father    • Heart disease Father    • Ulcers Other    • Polycythemia Other    • Hyperlipidemia Other    • Cancer Brother    • Cancer Sister    • Thyroid disease Sister    • Heart disease Sister    • Anxiety disorder Sister    • Depression Sister    • Thyroid disease Sister    • Heart disease Brother        Social History:  Social History     Tobacco Use   • Smoking status: Never Smoker   • Smokeless tobacco: Never Used   Vaping Use   • Vaping Use: Never  used   Substance Use Topics   • Alcohol use: Yes     Alcohol/week: 1.0 - 2.0 standard drink     Types: 1 - 2 Glasses of wine per week     Comment: social   • Drug use: No        Objective     Physical Exam:   R/r/r, ctab    Vital Signs  Temp:  [98 °F (36.7 °C)] 98 °F (36.7 °C)  Heart Rate:  [72] 72  Resp:  [18] 18  BP: (145)/(73) 145/73    Anticipated Surgical Procedure:  Lymph node biopsy    The risks, benefits and alternatives of this procedure have been discussed with the patient or responsible party: Yes        Angel Donaldson MD  07/22/22  12:41 EDT

## 2022-07-27 LAB
DX PRELIMINARY: NORMAL
LAB AP CASE REPORT: NORMAL
LAB AP CLINICAL INFORMATION: NORMAL
LAB AP DIAGNOSIS COMMENT: NORMAL
LAB AP FLOW CYTOMETRY SUMMARY: NORMAL
LAB AP SPECIAL STAINS: NORMAL
PATH REPORT.FINAL DX SPEC: NORMAL
PATH REPORT.GROSS SPEC: NORMAL

## 2022-08-03 ENCOUNTER — TELEPHONE (OUTPATIENT)
Dept: ONCOLOGY | Facility: CLINIC | Age: 72
End: 2022-08-03

## 2022-08-03 DIAGNOSIS — C85.92 NON-HODGKIN LYMPHOMA OF INTRATHORACIC LYMPH NODES, UNSPECIFIED NON-HODGKIN LYMPHOMA TYPE: ICD-10-CM

## 2022-08-03 DIAGNOSIS — R93.89 ABNORMAL FINDING ON IMAGING: ICD-10-CM

## 2022-08-03 DIAGNOSIS — C85.90 NON-HODGKIN'S LYMPHOMA, UNSPECIFIED BODY REGION, UNSPECIFIED NON-HODGKIN LYMPHOMA TYPE: Primary | ICD-10-CM

## 2022-08-03 NOTE — TELEPHONE ENCOUNTER
Caller: Mag Palomino    Relationship: Self    Best call back number: [phone]      What was the call regarding: PATIENT CALLED WANTED TO MAKE SURE DR DAVENPORT HAS SEEN ALL THE SCANS AND BIOPSY REPORTS BEFORE SHE COMES IN TO HER APPOINTMENT

## 2022-08-03 NOTE — TELEPHONE ENCOUNTER
----- Message from Jacqui Mendoza RN sent at 8/3/2022  1:23 PM EDT -----  Please schedule patient for PET scan, her appt is on 8/10, hopefully we can have it done and resulted prior to her appt. Thank you so much!    ----- Message -----  From: Michael James MD  Sent: 8/3/2022  12:35 PM EDT  To: Jacqui Mendoza RN    If possible please schedule a PET scan prior to her next visit with a diagnosis ofNon-Hodgkin's lymphoma  ----- Message -----  From: Jacqui Mendoza RN  Sent: 8/3/2022  12:17 PM EDT  To: MD Dr Jacob Lainez,    Patient is to see you on 8/10, she had CT scan 7/8 and US guided lymph node biopsy on 7/22.     Tissue Pathology: Final Diagnosis  1-2. Lymph Node, Right Axilla, Core Biopsies for Adenopathy ([id number]): SMALL LYMPHOCYTIC LYMPHOMA (SLL/CLL).    Any orders/tests prior to her follow up?     (Patient called today to ensure you saw her scans and biopsy reports)    Thank you very much!

## 2022-08-04 ENCOUNTER — HOSPITAL ENCOUNTER (OUTPATIENT)
Dept: PET IMAGING | Facility: HOSPITAL | Age: 72
Discharge: HOME | End: 2022-08-04

## 2022-08-04 DIAGNOSIS — C85.90 NON-HODGKIN'S LYMPHOMA, UNSPECIFIED BODY REGION, UNSPECIFIED NON-HODGKIN LYMPHOMA TYPE: ICD-10-CM

## 2022-08-04 DIAGNOSIS — R93.89 ABNORMAL FINDING ON IMAGING: ICD-10-CM

## 2022-08-04 DIAGNOSIS — C85.92 NON-HODGKIN LYMPHOMA OF INTRATHORACIC LYMPH NODES, UNSPECIFIED NON-HODGKIN LYMPHOMA TYPE: ICD-10-CM

## 2022-08-04 LAB — GLUCOSE BLDC GLUCOMTR-MCNC: 93 MG/DL (ref 70–130)

## 2022-08-04 PROCEDURE — 82962 GLUCOSE BLOOD TEST: CPT

## 2022-08-04 PROCEDURE — A9552 F18 FDG: HCPCS | Performed by: INTERNAL MEDICINE

## 2022-08-04 PROCEDURE — 78815 PET IMAGE W/CT SKULL-THIGH: CPT

## 2022-08-04 PROCEDURE — 0 FLUDEOXYGLUCOSE F18 SOLUTION: Performed by: INTERNAL MEDICINE

## 2022-08-04 RX ADMIN — FLUDEOXYGLUCOSE F18 1 DOSE: 300 INJECTION INTRAVENOUS at 07:32

## 2022-08-10 ENCOUNTER — OFFICE VISIT (OUTPATIENT)
Dept: ONCOLOGY | Facility: CLINIC | Age: 72
End: 2022-08-10

## 2022-08-10 ENCOUNTER — LAB (OUTPATIENT)
Dept: LAB | Facility: HOSPITAL | Age: 72
End: 2022-08-10

## 2022-08-10 VITALS
SYSTOLIC BLOOD PRESSURE: 140 MMHG | WEIGHT: 117.6 LBS | DIASTOLIC BLOOD PRESSURE: 78 MMHG | TEMPERATURE: 96.9 F | OXYGEN SATURATION: 100 % | BODY MASS INDEX: 20.84 KG/M2 | HEIGHT: 63 IN | RESPIRATION RATE: 16 BRPM | HEART RATE: 55 BPM

## 2022-08-10 DIAGNOSIS — D05.02 NEOPLASM OF LEFT BREAST, PRIMARY TUMOR STAGING CATEGORY TIS: LOBULAR CARCINOMA IN SITU (LCIS): ICD-10-CM

## 2022-08-10 DIAGNOSIS — C83.00 SMALL LYMPHOCYTIC LYMPHOMA: ICD-10-CM

## 2022-08-10 DIAGNOSIS — D05.02 NEOPLASM OF LEFT BREAST, PRIMARY TUMOR STAGING CATEGORY TIS: LOBULAR CARCINOMA IN SITU (LCIS): Primary | ICD-10-CM

## 2022-08-10 DIAGNOSIS — R59.1 LYMPHADENOPATHY: ICD-10-CM

## 2022-08-10 LAB
ALBUMIN SERPL-MCNC: 4.9 G/DL (ref 3.5–5.2)
ALBUMIN/GLOB SERPL: 2.1 G/DL (ref 1.1–2.4)
ALP SERPL-CCNC: 50 U/L (ref 38–116)
ALT SERPL W P-5'-P-CCNC: 21 U/L (ref 0–33)
ANION GAP SERPL CALCULATED.3IONS-SCNC: 12.2 MMOL/L (ref 5–15)
AST SERPL-CCNC: 27 U/L (ref 0–32)
BASOPHILS # BLD AUTO: 0.03 10*3/MM3 (ref 0–0.2)
BASOPHILS NFR BLD AUTO: 0.5 % (ref 0–1.5)
BILIRUB SERPL-MCNC: 0.5 MG/DL (ref 0.2–1.2)
BUN SERPL-MCNC: 16 MG/DL (ref 6–20)
BUN/CREAT SERPL: 19.8 (ref 7.3–30)
CALCIUM SPEC-SCNC: 10.1 MG/DL (ref 8.5–10.2)
CHLORIDE SERPL-SCNC: 107 MMOL/L (ref 98–107)
CO2 SERPL-SCNC: 24.8 MMOL/L (ref 22–29)
CREAT SERPL-MCNC: 0.81 MG/DL (ref 0.6–1.1)
DEPRECATED RDW RBC AUTO: 42.8 FL (ref 37–54)
EGFRCR SERPLBLD CKD-EPI 2021: 77.7 ML/MIN/1.73
EOSINOPHIL # BLD AUTO: 0.07 10*3/MM3 (ref 0–0.4)
EOSINOPHIL NFR BLD AUTO: 1.3 % (ref 0.3–6.2)
ERYTHROCYTE [DISTWIDTH] IN BLOOD BY AUTOMATED COUNT: 12.6 % (ref 12.3–15.4)
GLOBULIN UR ELPH-MCNC: 2.3 GM/DL (ref 1.8–3.5)
GLUCOSE SERPL-MCNC: 94 MG/DL (ref 74–124)
HCT VFR BLD AUTO: 42.7 % (ref 34–46.6)
HGB BLD-MCNC: 13.8 G/DL (ref 12–15.9)
IMM GRANULOCYTES # BLD AUTO: 0.01 10*3/MM3 (ref 0–0.05)
IMM GRANULOCYTES NFR BLD AUTO: 0.2 % (ref 0–0.5)
LYMPHOCYTES # BLD AUTO: 1.39 10*3/MM3 (ref 0.7–3.1)
LYMPHOCYTES NFR BLD AUTO: 25.4 % (ref 19.6–45.3)
MCH RBC QN AUTO: 29.9 PG (ref 26.6–33)
MCHC RBC AUTO-ENTMCNC: 32.3 G/DL (ref 31.5–35.7)
MCV RBC AUTO: 92.6 FL (ref 79–97)
MONOCYTES # BLD AUTO: 0.44 10*3/MM3 (ref 0.1–0.9)
MONOCYTES NFR BLD AUTO: 8 % (ref 5–12)
NEUTROPHILS NFR BLD AUTO: 3.53 10*3/MM3 (ref 1.7–7)
NEUTROPHILS NFR BLD AUTO: 64.6 % (ref 42.7–76)
NRBC BLD AUTO-RTO: 0 /100 WBC (ref 0–0.2)
PLATELET # BLD AUTO: 197 10*3/MM3 (ref 140–450)
PMV BLD AUTO: 9.6 FL (ref 6–12)
POTASSIUM SERPL-SCNC: 4.6 MMOL/L (ref 3.5–4.7)
PROT SERPL-MCNC: 7.2 G/DL (ref 6.3–8)
RBC # BLD AUTO: 4.61 10*6/MM3 (ref 3.77–5.28)
SODIUM SERPL-SCNC: 144 MMOL/L (ref 134–145)
WBC NRBC COR # BLD: 5.47 10*3/MM3 (ref 3.4–10.8)

## 2022-08-10 PROCEDURE — 99214 OFFICE O/P EST MOD 30 MIN: CPT | Performed by: INTERNAL MEDICINE

## 2022-08-10 PROCEDURE — 85025 COMPLETE CBC W/AUTO DIFF WBC: CPT

## 2022-08-10 PROCEDURE — 36415 COLL VENOUS BLD VENIPUNCTURE: CPT

## 2022-08-10 PROCEDURE — 80053 COMPREHEN METABOLIC PANEL: CPT

## 2022-08-10 NOTE — PROGRESS NOTES
Subjective   REASON FOR FOLLOWUP:   1.  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  New diagnosis of small lymphocytic lymphoma from axillary lymph node biopsy 7/22/2022    HISTORY OF PRESENT ILLNESS:   The patient is a 72 y.o. female with the above-noted history of LCIS of the left breast in 2000. She had lumpectomy and took 5 years of tamoxifen as breast cancer prevention. She finished her tamoxifen 17 years ago .     She had been seeing us annually in December but she had a recent visit to her primary care office for a palpable small lymph node in the right neck.  This led to a CT scan of the neck which showed some scattered lymphadenopathy and a thyroid mass.  Because of the scattered lymphadenopathy the radiologist recommended CT scans of the chest abdomen pelvis to evaluate for lymphoproliferative disorder.  The scans were performed on 7/8/2022 showing diffuse scattered lymphadenopathy.    She subsequently underwent a CT-guided axillary lymph node biopsy on 7/22/2022 with pathology returning a small lymphocytic lymphoma.    She returns today for further discussion of this new diagnosis.  She also has undergone a PET scan from 8/4/2022 showing that the lymphadenopathy is not significantly hypermetabolic.    History of Present Illness     Past Medical History, Past Surgical History, Social History, Family History have been reviewed and are without significant changes except as mentioned.    Review of Systems   Constitutional: Negative for activity change, appetite change, fatigue, fever and unexpected weight change.   HENT: Negative for hearing loss, nosebleeds, trouble swallowing and voice change.    Eyes: Negative for visual disturbance.   Respiratory: Negative for cough, shortness of breath and wheezing.    Cardiovascular: Negative for chest pain and palpitations.   Gastrointestinal: Negative for abdominal pain, diarrhea, nausea and vomiting.   Genitourinary: Negative  "for difficulty urinating, frequency, hematuria and urgency.   Musculoskeletal: Negative for back pain and neck pain.   Skin: Negative for rash.   Neurological: Negative for dizziness, seizures, syncope and headaches.   Hematological: Negative for adenopathy. Does not bruise/bleed easily.   Psychiatric/Behavioral: Negative for behavioral problems. The patient is not nervous/anxious.      A comprehensive 14 point review of systems was performed and was negative except as mentioned.    Medications:  The current medication list was reviewed in the EMR    ALLERGIES:  No Known Allergies    Objective      Vitals:    08/10/22 1037   BP: 140/78   Pulse: 55   Resp: 16   Temp: 96.9 °F (36.1 °C)   TempSrc: Temporal   SpO2: 100%   Weight: 53.3 kg (117 lb 9.6 oz)   Height: 160 cm (62.99\")   PainSc: 0-No pain     Current Status 12/2/2021   ECOG score 0       Physical Exam   Constitutional: She is oriented to person, place, and time. She appears well-developed. No distress.   HENT:   Head: Normocephalic.   Eyes: Pupils are equal, round, and reactive to light.   Neck: No JVD present. No thyromegaly present.   Cardiovascular: Normal rate and regular rhythm. Exam reveals no gallop and no friction rub.   No murmur heard.  Pulmonary/Chest: Effort normal and breath sounds normal. She has no wheezes. She has no rales. Right breast exhibits no mass, no nipple discharge and no skin change. Left breast exhibits no mass, no nipple discharge and no skin change.   Abdominal: Soft. Bowel sounds are normal. She exhibits no distension and no mass. There is no abdominal tenderness.   Musculoskeletal: No deformity.   Neurological: She is alert and oriented to person, place, and time. She has normal reflexes. No cranial nerve deficit.   Skin: Skin is dry. No rash noted.   Psychiatric: Judgment normal.     I have reexamined the patient and the results are consistent with the previously documented exam. Michael James MD       RECENT " LABS:  Hematology WBC   Date Value Ref Range Status   08/10/2022 5.47 3.40 - 10.80 10*3/mm3 Final   04/14/2022 5.2 3.4 - 10.8 x10E3/uL Final     RBC   Date Value Ref Range Status   08/10/2022 4.61 3.77 - 5.28 10*6/mm3 Final   04/14/2022 4.87 3.77 - 5.28 x10E6/uL Final     Hemoglobin   Date Value Ref Range Status   08/10/2022 13.8 12.0 - 15.9 g/dL Final     Hematocrit   Date Value Ref Range Status   08/10/2022 42.7 34.0 - 46.6 % Final     Platelets   Date Value Ref Range Status   08/10/2022 197 140 - 450 10*3/mm3 Final          PATHOLOGY 7/22/2022  Final Diagnosis   1-2. Lymph Node, Right Axilla, Core Biopsies for Adenopathy ([id number]): SMALL LYMPHOCYTIC LYMPHOMA (SLL/CLL).     Special Stains    Utilizing appropriate controls, multiple immunohistochemical stains are performed including CD3, CD5, CD10, CD20, CD79A, BCL-2, BCL-6 and Cyclin D1.  The neoplastic B cells stain positive for CD20, CD79A and BCL-2.  CD3 highlights interspersed T cells.  Cyclin D1 is negative. CD5 appears positive by IHC.   [facility]    Flow Cytometry Summary    [id number]: IMMUNOPHENOTYPING REVEALS A CD5 NEGATIVE, CD10 NEGATIVE MONOCLONAL [place] B CELL POPULATION WITHIN THE SMALL CELL REGION REPRESENTING 17 % OF TOTAL EVENTS.     IMAGING:  F-18 FDG PET SKULL BASE TO MID THIGH WITH PET CT FUSION 8/4/2022  IMPRESSION:  1. The most intensely hypermetabolic lymphadenopathy is at the  retroperitoneum and the intensity is slightly greater than that of blood  pool. The rest of the lymphadenopathy has blood pool range metabolic  activity. There is no suspicious splenic or bone activity.  2. Conservative surveillance of the 1.4 cm anterior hepatic segment  lesion is recommended with a contrast-enhanced CT of the abdomen in 3-4  Months.    CT CHEST, ABDOMEN, AND PELVIS WITH IV CONTRAST 7/8/2022  IMPRESSION:  Impression:  1.  Adenopathy throughout the chest abdomen and pelvis concerning for  lymphoproliferative process, however, metastasis is not  entirely  excluded given history. Recommend biopsy for definitive diagnosis,  axillary or left para-aortic adenopathy may be most amenable to  percutaneous sampling.  2.  Indeterminate hypodense lesion in the right hepatic lobe. If there  are no priors available for comparison followup with liver protocol  imaging could be considered.  3.  Right thyroid mass please correlate with recent biopsy.  4.  Bilateral nephrolithiasis with mild pelvocaliectasis. Multiple renal  lesions the largest compatible with cysts the others too small to  completely characterize.  5.  Please see above for additional findings/recommendations.      CONTRAST-ENHANCED CT SCAN OF THE NECK 05/20/2022  IMPRESSION:  1. There is cervical spondylosis as described above with uncovertebral  joint spurs mild to moderately narrowing the left neural foramen at C3-4  and there is posterior spurring and uncovertebral joint hypertrophy  contributing to mild canal and mild-to-moderate left and moderate right  bony foraminal narrowing at C5-C6 and there is mild canal and mild left  and mild-to-moderate right bony foraminal narrowing at C6-7.  2. There is a mass replacing the majority of the mid and inferior right  lobe of the thyroid that measures 2.7 x 2.3 x 4.4 cm in size.  It has  some areas of central necrosis and inferiorly it has some cystic change.   It is an indeterminate thyroid mass and I recommend a thyroid  ultrasound and the mass could be needle aspirated if clinically  indicated.  3. There are multiple small lymph nodes tracking up and down the  posterior cervical triangles of the neck bilaterally, right greater than  left.  They are more numerous than typically seen, but none of them  appear significantly enlarged.  The single largest right posterior  cervical triangle is deep to the BB placed on the skin at the site of  the palpable abnormality and measures 9 x 6 x 11 mm in size and is  borderline in size.  Furthermore, there are multiple  small  supraclavicular nodes and retropectoral nodes and superior axillary  nodes more numerous than typically seen and some of the retropectoral  and high axillary nodes are borderline pathologic by size as well.  The  lymphadenopathy in the retropectoral and high axillary regions could be  secondary to low-grade lymphoproliferative process or reactive nodes  from systemic illness.  I recommend a chest, abdomen and pelvis CT to  further evaluate the nodes and clinical correlation with the findings.  The remainder of the neck CT is unremarkable.      SCREENING MAMMOGRAM WITH R2 COMPUTERIZED ASSISTED DETECTION AND DIGITAL  TOMOSYNTHESIS 7/16/2021  IMPRESSION:  1. There is no evidence for malignancy or significant change in either  breast. Routine followup mammography is recommended.     BI-RADS category 1: Negative.     Assessment & Plan     ASSESSMENT:   1. 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 She continues to do well with no evidence of malignancy on exam or on her mammograms.   2.  New diagnosis of small lymphocytic lymphoma with no bulky lymphadenopathy.  3.  Right lobe thyroid mass.  Patient had an FNA 7/7/2022 with pathology showing follicular lesion of undetermined significance ([place] category 3).  Patient has been seen by Dr. Arden Whitley in ENT and may be undergoing a partial thyroidectomy.    PLAN:   1. We reviewed the results of the CT scans, PET scan, axillary lymph node biopsy path report, and her thyroid FNA pathology report with the patient in the office today at length.  2. She does not have bulky lymphadenopathy (the lymph nodes are less than 2 cm) and remains asymptomatic.  We discussed the option of systemic treatment with Rituxan versus a period of observation.  Because she does not have any bulky nodes and is not having any symptoms directly related to the lymphoma we have elected to observe her for now  without treatment.  3. We will schedule follow-up visit in our office in 3 months with lab and physical exam.  4. We will plan to repeat CT scans of the neck chest abdomen and pelvis in 6 months.  5. I explained to the patient that this need not interfere with any plans that Dr. Whitley has for further evaluation and treatment of her thyroid nodule.                8/10/2022      CC:

## 2022-08-11 LAB
ALBUMIN SERPL ELPH-MCNC: 4.1 G/DL (ref 2.9–4.4)
ALBUMIN/GLOB SERPL: 1.6 {RATIO} (ref 0.7–1.7)
ALPHA1 GLOB SERPL ELPH-MCNC: 0.2 G/DL (ref 0–0.4)
ALPHA2 GLOB SERPL ELPH-MCNC: 0.8 G/DL (ref 0.4–1)
B-GLOBULIN SERPL ELPH-MCNC: 0.9 G/DL (ref 0.7–1.3)
GAMMA GLOB SERPL ELPH-MCNC: 0.7 G/DL (ref 0.4–1.8)
GLOBULIN SER-MCNC: 2.6 G/DL (ref 2.2–3.9)
IGA SERPL-MCNC: 34 MG/DL (ref 64–422)
IGG SERPL-MCNC: 730 MG/DL (ref 586–1602)
IGM SERPL-MCNC: 71 MG/DL (ref 26–217)
INTERPRETATION SERPL IEP-IMP: ABNORMAL
KAPPA LC FREE SER-MCNC: 12.9 MG/L (ref 3.3–19.4)
KAPPA LC FREE/LAMBDA FREE SER: 2.43 {RATIO} (ref 0.26–1.65)
LABORATORY COMMENT REPORT: ABNORMAL
LAMBDA LC FREE SERPL-MCNC: 5.3 MG/L (ref 5.7–26.3)
M PROTEIN SERPL ELPH-MCNC: ABNORMAL G/DL
PROT SERPL-MCNC: 6.7 G/DL (ref 6–8.5)

## 2022-08-17 ENCOUNTER — HOSPITAL ENCOUNTER (OUTPATIENT)
Dept: MAMMOGRAPHY | Facility: HOSPITAL | Age: 72
Discharge: HOME | End: 2022-08-17
Admitting: INTERNAL MEDICINE

## 2022-08-17 DIAGNOSIS — E07.9 THYROID MASS: ICD-10-CM

## 2022-08-17 DIAGNOSIS — Z12.31 ENCOUNTER FOR SCREENING MAMMOGRAM FOR MALIGNANT NEOPLASM OF BREAST: ICD-10-CM

## 2022-08-17 DIAGNOSIS — R59.1 LYMPHADENOPATHY: ICD-10-CM

## 2022-08-17 DIAGNOSIS — D05.02 NEOPLASM OF LEFT BREAST, PRIMARY TUMOR STAGING CATEGORY TIS: LOBULAR CARCINOMA IN SITU (LCIS): ICD-10-CM

## 2022-08-17 PROCEDURE — 77063 BREAST TOMOSYNTHESIS BI: CPT

## 2022-08-17 PROCEDURE — 77067 SCR MAMMO BI INCL CAD: CPT

## 2022-08-20 DIAGNOSIS — E78.2 MIXED HYPERLIPIDEMIA: ICD-10-CM

## 2022-08-22 RX ORDER — ATORVASTATIN CALCIUM 20 MG/1
TABLET, FILM COATED ORAL
Qty: 90 TABLET | Refills: 0 | Status: SHIPPED | OUTPATIENT
Start: 2022-08-22 | End: 2022-11-22 | Stop reason: SDUPTHER

## 2022-09-28 LAB
CYTO UR: NORMAL
LAB AP CASE REPORT: NORMAL
LAB AP CLINICAL INFORMATION: NORMAL
LAB AP NON-GYN SPECIMEN ADEQUACY: NORMAL
Lab: NORMAL
PATH REPORT.ADDENDUM SPEC: NORMAL
PATH REPORT.FINAL DX SPEC: NORMAL
PATH REPORT.GROSS SPEC: NORMAL

## 2022-10-05 ENCOUNTER — TELEPHONE (OUTPATIENT)
Dept: ONCOLOGY | Facility: CLINIC | Age: 72
End: 2022-10-05

## 2022-10-05 NOTE — TELEPHONE ENCOUNTER
Pt's dentist called to verify pt's recent CBC results to evaluate for safety of current dental work. Faxed CBC results to dental office and gave verbal results of her ANC as well.

## 2022-10-17 ENCOUNTER — OFFICE VISIT (OUTPATIENT)
Dept: INTERNAL MEDICINE | Facility: CLINIC | Age: 72
End: 2022-10-17

## 2022-10-17 VITALS
BODY MASS INDEX: 20.73 KG/M2 | SYSTOLIC BLOOD PRESSURE: 120 MMHG | OXYGEN SATURATION: 98 % | DIASTOLIC BLOOD PRESSURE: 70 MMHG | HEIGHT: 63 IN | HEART RATE: 84 BPM | TEMPERATURE: 98.1 F | WEIGHT: 117 LBS

## 2022-10-17 DIAGNOSIS — E78.5 HYPERLIPIDEMIA, UNSPECIFIED HYPERLIPIDEMIA TYPE: Chronic | ICD-10-CM

## 2022-10-17 DIAGNOSIS — C83.00 SMALL LYMPHOCYTIC LYMPHOMA: ICD-10-CM

## 2022-10-17 DIAGNOSIS — L70.9 ACNE, UNSPECIFIED ACNE TYPE: ICD-10-CM

## 2022-10-17 DIAGNOSIS — Z00.00 MEDICARE ANNUAL WELLNESS VISIT, SUBSEQUENT: Primary | ICD-10-CM

## 2022-10-17 DIAGNOSIS — E07.9 THYROID MASS: ICD-10-CM

## 2022-10-17 DIAGNOSIS — Z78.0 POST-MENOPAUSAL: ICD-10-CM

## 2022-10-17 PROBLEM — R53.83 FATIGUE: Status: RESOLVED | Noted: 2019-01-21 | Resolved: 2022-10-17

## 2022-10-17 PROBLEM — H65.193 ACUTE EFFUSION OF BOTH MIDDLE EARS: Status: RESOLVED | Noted: 2022-04-14 | Resolved: 2022-10-17

## 2022-10-17 PROBLEM — R59.1 LYMPHADENOPATHY: Status: RESOLVED | Noted: 2022-04-14 | Resolved: 2022-10-17

## 2022-10-17 LAB
CHOLEST SERPL-MCNC: 156 MG/DL (ref 0–200)
HDLC SERPL-MCNC: 69 MG/DL (ref 40–60)
LDLC SERPL CALC-MCNC: 71 MG/DL (ref 0–100)
TRIGL SERPL-MCNC: 83 MG/DL (ref 0–150)
TSH SERPL DL<=0.005 MIU/L-ACNC: 3.65 UIU/ML (ref 0.27–4.2)
VLDLC SERPL CALC-MCNC: 16 MG/DL (ref 5–40)

## 2022-10-17 PROCEDURE — 1159F MED LIST DOCD IN RCRD: CPT | Performed by: NURSE PRACTITIONER

## 2022-10-17 PROCEDURE — 1126F AMNT PAIN NOTED NONE PRSNT: CPT | Performed by: NURSE PRACTITIONER

## 2022-10-17 PROCEDURE — 1170F FXNL STATUS ASSESSED: CPT | Performed by: NURSE PRACTITIONER

## 2022-10-17 PROCEDURE — G0439 PPPS, SUBSEQ VISIT: HCPCS | Performed by: NURSE PRACTITIONER

## 2022-10-17 NOTE — PROGRESS NOTES
The ABCs of the Annual Wellness Visit  Subsequent Medicare Wellness Visit    Chief Complaint   Patient presents with   • Medicare Wellness-subsequent   • Sinus Problem      Subjective    History of Present Illness:  Mag Palomino is a 72 y.o. female who presents for a Subsequent Medicare Wellness Visit.    The following portions of the patient's history were reviewed and   updated as appropriate: allergies, current medications, past family history, past medical history, past social history, past surgical history and problem list.    Compared to one year ago, the patient feels her physical   health is the same.    Compared to one year ago, the patient feels her mental   health is the same.    Recent Hospitalizations:  She was not admitted to the hospital during the last year.       Current Medical Providers:  Patient Care Team:  Carol Jorge APRN as PCP - General (Internal Medicine)  Michael James MD as Consulting Physician (Hematology and Oncology)  Yessy Hui MD as Consulting Physician (Dermatology)  Yifan Salmeron MD as Consulting Physician (Otolaryngology)  Jose De La Torre MD as Consulting Physician (Cardiology)  Purvi Martinez MD as Referring Physician (Internal Medicine)  Mag York MD as Surgeon (General Surgery)  Stephanie Avendano MD as Consulting Physician (Ophthalmology)    Outpatient Medications Prior to Visit   Medication Sig Dispense Refill   • acetaminophen (TYLENOL) 325 MG tablet Take 650 mg by mouth Every 6 (Six) Hours As Needed for mild pain (1-3).     • atorvastatin (LIPITOR) 20 MG tablet TAKE ONE TABLET BY MOUTH DAILY 90 tablet 0   • cetirizine (ZyrTEC) 10 MG tablet Take 10 mg by mouth daily.     • docusate sodium (COLACE) 100 MG capsule Take 100 mg by mouth as needed for constipation.     • lansoprazole (PREVACID) 15 MG capsule Take 15 mg by mouth As Needed.     • Multiple Vitamin (MULTIVITAMINS PO) Take 1 tablet by mouth Daily.     • Omega-3 Fatty Acids  "(FISH OIL) 1000 MG capsule capsule Take 1,000 mg by mouth Daily With Breakfast.     • spironolactone (ALDACTONE) 100 MG tablet Take 1 tablet by mouth daily.       No facility-administered medications prior to visit.       No opioid medication identified on active medication list. I have reviewed chart for other potential  high risk medication/s and harmful drug interactions in the elderly.          Aspirin is not on active medication list.  Aspirin use is not indicated based on review of current medical condition/s. Risk of harm outweighs potential benefits.  .    Patient Active Problem List   Diagnosis   • Allergic rhinitis   • Backache   • Hyperlipidemia   • Dizzy   • History of colonic polyps   • Palpitation   • Neoplasm of left breast, primary tumor staging category Tis: lobular carcinoma in situ (LCIS)   • Arm pain   • Constipation   • Hair loss   • Borderline high blood pressure   • Heartburn   • Elevated liver function tests   • Acne   • Medicare annual wellness visit, subsequent   • Thyroid mass   • Small lymphocytic lymphoma (HCC)     Advance Care Planning  Advance Directive is on file.  ACP discussion was held with the patient during this visit. Patient has an advance directive in EMR which is still valid.           Objective    Vitals:    10/17/22 0807 10/17/22 0825   BP: 142/74 120/70   BP Location:  Left arm   Patient Position:  Sitting   Cuff Size:  Adult   Pulse: 84    Temp: 98.1 °F (36.7 °C)    SpO2: 98%    Weight: 53.1 kg (117 lb)    Height: 160 cm (63\")    PainSc: 0-No pain      Estimated body mass index is 20.73 kg/m² as calculated from the following:    Height as of this encounter: 160 cm (63\").    Weight as of this encounter: 53.1 kg (117 lb).    BMI is within normal parameters. No other follow-up for BMI required.      Does the patient have evidence of cognitive impairment? No    Physical Exam  Constitutional:       Appearance: Normal appearance. She is normal weight.   HENT:      Head: " Normocephalic and atraumatic.      Right Ear: External ear normal.      Left Ear: External ear normal.      Nose: Nose normal.      Mouth/Throat:      Mouth: Mucous membranes are moist.      Pharynx: Oropharynx is clear.   Eyes:      Pupils: Pupils are equal, round, and reactive to light.      Comments: +glasses   Neck:      Vascular: No carotid bruit.   Cardiovascular:      Rate and Rhythm: Normal rate and regular rhythm.      Pulses: Normal pulses.      Heart sounds: Normal heart sounds. No murmur heard.    No friction rub. No gallop.   Pulmonary:      Effort: Pulmonary effort is normal. No respiratory distress.      Breath sounds: Normal breath sounds. No stridor. No wheezing, rhonchi or rales.   Abdominal:      General: Bowel sounds are normal. There is no distension.      Palpations: Abdomen is soft.      Tenderness: There is no abdominal tenderness.   Musculoskeletal:         General: Normal range of motion.      Cervical back: Normal range of motion and neck supple.   Skin:     General: Skin is warm and dry.      Capillary Refill: Capillary refill takes less than 2 seconds.      Coloration: Skin is not jaundiced.      Findings: No bruising.   Neurological:      General: No focal deficit present.      Mental Status: She is alert and oriented to person, place, and time. Mental status is at baseline.   Psychiatric:         Mood and Affect: Mood normal.         Thought Content: Thought content normal.         Judgment: Judgment normal.                 HEALTH RISK ASSESSMENT    Smoking Status:  Social History     Tobacco Use   Smoking Status Never   Smokeless Tobacco Never     Alcohol Consumption:  Social History     Substance and Sexual Activity   Alcohol Use Yes   • Alcohol/week: 1.0 - 2.0 standard drink   • Types: 1 - 2 Glasses of wine per week    Comment: social     Fall Risk Screen:    STEADI Fall Risk Assessment was completed, and patient is at LOW risk for falls.Assessment completed  on:10/17/2022    Depression Screening:  PHQ-2/PHQ-9 Depression Screening 10/17/2022   Retired PHQ-9 Total Score -   Retired Total Score -   Little Interest or Pleasure in Doing Things 0-->not at all   Feeling Down, Depressed or Hopeless 0-->not at all   PHQ-9: Brief Depression Severity Measure Score 0       Health Habits and Functional and Cognitive Screening:  Functional & Cognitive Status 10/17/2022   Do you have difficulty preparing food and eating? No   Do you have difficulty bathing yourself, getting dressed or grooming yourself? No   Do you have difficulty using the toilet? No   Do you have difficulty moving around from place to place? No   Do you have trouble with steps or getting out of a bed or a chair? No   Current Diet Well Balanced Diet   Dental Exam Up to date   Eye Exam Up to date        Eye Exam Comment -   Exercise (times per week) 3 times per week   Current Exercises Include Walking   Current Exercise Activities Include -   Do you need help using the phone?  No   Are you deaf or do you have serious difficulty hearing?  No   Do you need help with transportation? No   Do you need help shopping? No   Do you need help preparing meals?  No   Do you need help with housework?  No   Do you need help with laundry? No   Do you need help taking your medications? No   Do you need help managing money? No   Do you ever drive or ride in a car without wearing a seat belt? No   Have you felt unusual stress, anger or loneliness in the last month? No   Who do you live with? Spouse   If you need help, do you have trouble finding someone available to you? No   Have you been bothered in the last four weeks by sexual problems? No   Do you have difficulty concentrating, remembering or making decisions? No       Age-appropriate Screening Schedule:  Refer to the list below for future screening recommendations based on patient's age, sex and/or medical conditions. Orders for these recommended tests are listed in the plan  section. The patient has been provided with a written plan.    Health Maintenance   Topic Date Due   • DXA SCAN  10/22/2022   • LIPID PANEL  04/14/2023   • TDAP/TD VACCINES (2 - Td or Tdap) 08/01/2023   • MAMMOGRAM  08/17/2023   • INFLUENZA VACCINE  Completed   • ZOSTER VACCINE  Completed   • PAP SMEAR  Discontinued              Assessment & Plan   CMS Preventative Services Quick Reference  Risk Factors Identified During Encounter  None Identified  The above risks/problems have been discussed with the patient.  Follow up actions/plans if indicated are seen below in the Assessment/Plan Section.  Pertinent information has been shared with the patient in the After Visit Summary.    Diagnoses and all orders for this visit:    1. Medicare annual wellness visit, subsequent (Primary)    2. Thyroid mass  Assessment & Plan:  Following with Dr. Whitley.     Orders:  -     TSH Rfx On Abnormal To Free T4    3. Small lymphocytic lymphoma (HCC)  Assessment & Plan:  Continues following with CBC.       4. Hyperlipidemia, unspecified hyperlipidemia type  Assessment & Plan:  Lipid abnormalities are improving with treatment.  Nutritional counseling was provided. and Pharmacotherapy as ordered.  Lipids will be reassessed in 6 months.    Orders:  -     Lipid panel    5. Acne, unspecified acne type  Assessment & Plan:  Continues on aldactone.       6. Post-menopausal  -     DEXA Bone Density Axial; Future      Follow Up:   Return in about 6 months (around 4/17/2023) for Recheck chronic conditions.     An After Visit Summary and PPPS were made available to the patient.

## 2022-11-02 ENCOUNTER — OFFICE VISIT (OUTPATIENT)
Dept: ONCOLOGY | Facility: CLINIC | Age: 72
End: 2022-11-02

## 2022-11-02 ENCOUNTER — LAB (OUTPATIENT)
Dept: LAB | Facility: HOSPITAL | Age: 72
End: 2022-11-02

## 2022-11-02 VITALS
OXYGEN SATURATION: 99 % | RESPIRATION RATE: 16 BRPM | SYSTOLIC BLOOD PRESSURE: 131 MMHG | HEIGHT: 63 IN | DIASTOLIC BLOOD PRESSURE: 77 MMHG | TEMPERATURE: 97.3 F | HEART RATE: 69 BPM | WEIGHT: 121.3 LBS | BODY MASS INDEX: 21.49 KG/M2

## 2022-11-02 DIAGNOSIS — R59.1 LYMPHADENOPATHY: ICD-10-CM

## 2022-11-02 DIAGNOSIS — C83.00 SMALL LYMPHOCYTIC LYMPHOMA: ICD-10-CM

## 2022-11-02 DIAGNOSIS — D05.02 NEOPLASM OF LEFT BREAST, PRIMARY TUMOR STAGING CATEGORY TIS: LOBULAR CARCINOMA IN SITU (LCIS): ICD-10-CM

## 2022-11-02 DIAGNOSIS — D05.02 NEOPLASM OF LEFT BREAST, PRIMARY TUMOR STAGING CATEGORY TIS: LOBULAR CARCINOMA IN SITU (LCIS): Primary | ICD-10-CM

## 2022-11-02 DIAGNOSIS — E07.9 THYROID MASS: ICD-10-CM

## 2022-11-02 LAB
ALBUMIN SERPL-MCNC: 4.7 G/DL (ref 3.5–5.2)
ALBUMIN/GLOB SERPL: 1.9 G/DL (ref 1.1–2.4)
ALP SERPL-CCNC: 54 U/L (ref 38–116)
ALT SERPL W P-5'-P-CCNC: 19 U/L (ref 0–33)
ANION GAP SERPL CALCULATED.3IONS-SCNC: 11.2 MMOL/L (ref 5–15)
AST SERPL-CCNC: 25 U/L (ref 0–32)
BASOPHILS # BLD AUTO: 0.03 10*3/MM3 (ref 0–0.2)
BASOPHILS NFR BLD AUTO: 0.5 % (ref 0–1.5)
BILIRUB SERPL-MCNC: 0.5 MG/DL (ref 0.2–1.2)
BUN SERPL-MCNC: 17 MG/DL (ref 6–20)
BUN/CREAT SERPL: 22.7 (ref 7.3–30)
CALCIUM SPEC-SCNC: 9.9 MG/DL (ref 8.5–10.2)
CHLORIDE SERPL-SCNC: 103 MMOL/L (ref 98–107)
CO2 SERPL-SCNC: 26.8 MMOL/L (ref 22–29)
CREAT SERPL-MCNC: 0.75 MG/DL (ref 0.6–1.1)
DEPRECATED RDW RBC AUTO: 43.8 FL (ref 37–54)
EGFRCR SERPLBLD CKD-EPI 2021: 85.2 ML/MIN/1.73
EOSINOPHIL # BLD AUTO: 0.05 10*3/MM3 (ref 0–0.4)
EOSINOPHIL NFR BLD AUTO: 0.9 % (ref 0.3–6.2)
ERYTHROCYTE [DISTWIDTH] IN BLOOD BY AUTOMATED COUNT: 13.1 % (ref 12.3–15.4)
GLOBULIN UR ELPH-MCNC: 2.5 GM/DL (ref 1.8–3.5)
GLUCOSE SERPL-MCNC: 91 MG/DL (ref 74–124)
HCT VFR BLD AUTO: 41.2 % (ref 34–46.6)
HGB BLD-MCNC: 13.6 G/DL (ref 12–15.9)
IMM GRANULOCYTES # BLD AUTO: 0.01 10*3/MM3 (ref 0–0.05)
IMM GRANULOCYTES NFR BLD AUTO: 0.2 % (ref 0–0.5)
LDH SERPL-CCNC: 188 U/L (ref 99–259)
LYMPHOCYTES # BLD AUTO: 1.69 10*3/MM3 (ref 0.7–3.1)
LYMPHOCYTES NFR BLD AUTO: 29.8 % (ref 19.6–45.3)
MCH RBC QN AUTO: 30.2 PG (ref 26.6–33)
MCHC RBC AUTO-ENTMCNC: 33 G/DL (ref 31.5–35.7)
MCV RBC AUTO: 91.4 FL (ref 79–97)
MONOCYTES # BLD AUTO: 0.54 10*3/MM3 (ref 0.1–0.9)
MONOCYTES NFR BLD AUTO: 9.5 % (ref 5–12)
NEUTROPHILS NFR BLD AUTO: 3.36 10*3/MM3 (ref 1.7–7)
NEUTROPHILS NFR BLD AUTO: 59.1 % (ref 42.7–76)
NRBC BLD AUTO-RTO: 0 /100 WBC (ref 0–0.2)
PLATELET # BLD AUTO: 207 10*3/MM3 (ref 140–450)
PMV BLD AUTO: 9.7 FL (ref 6–12)
POTASSIUM SERPL-SCNC: 4.3 MMOL/L (ref 3.5–4.7)
PROT SERPL-MCNC: 7.2 G/DL (ref 6.3–8)
RBC # BLD AUTO: 4.51 10*6/MM3 (ref 3.77–5.28)
SODIUM SERPL-SCNC: 141 MMOL/L (ref 134–145)
URATE SERPL-MCNC: 3.8 MG/DL (ref 2.8–7.4)
WBC NRBC COR # BLD: 5.68 10*3/MM3 (ref 3.4–10.8)

## 2022-11-02 PROCEDURE — 36415 COLL VENOUS BLD VENIPUNCTURE: CPT

## 2022-11-02 PROCEDURE — 80053 COMPREHEN METABOLIC PANEL: CPT

## 2022-11-02 PROCEDURE — 99213 OFFICE O/P EST LOW 20 MIN: CPT | Performed by: INTERNAL MEDICINE

## 2022-11-02 PROCEDURE — 83615 LACTATE (LD) (LDH) ENZYME: CPT

## 2022-11-02 PROCEDURE — 85025 COMPLETE CBC W/AUTO DIFF WBC: CPT

## 2022-11-02 PROCEDURE — 84550 ASSAY OF BLOOD/URIC ACID: CPT

## 2022-11-02 NOTE — PROGRESS NOTES
Subjective   REASON FOR FOLLOWUP:   1.  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  New diagnosis of small lymphocytic lymphoma from axillary lymph node biopsy 7/22/2022    HISTORY OF PRESENT ILLNESS:   The patient is a 72 y.o. female with the above-noted history of LCIS of the left breast in 2000. She had lumpectomy and took 5 years of tamoxifen as breast cancer prevention. She finished her tamoxifen 17 years ago .     She had been seeing us annually in December but she had a recent visit to her primary care office for a palpable small lymph node in the right neck.  This led to a CT scan of the neck which showed some scattered lymphadenopathy and a thyroid mass.  Because of the scattered lymphadenopathy the radiologist recommended CT scans of the chest abdomen pelvis to evaluate for lymphoproliferative disorder.  The scans were performed on 7/8/2022 showing diffuse scattered lymphadenopathy.    She subsequently underwent a CT-guided axillary lymph node biopsy on 7/22/2022 with pathology returning a small lymphocytic lymphoma.    INTERVAL HISTORY:  Because she was asymptomatic we elected to observe her without specific therapy initially and she returns today for 3-month follow-up visit.    She seems to be doing well.  Her blood count is normal.  She had her annual screening mammogram performed 8/17/2022 with no suspicious findings.    History of Present Illness     Past Medical History, Past Surgical History, Social History, Family History have been reviewed and are without significant changes except as mentioned.    Review of Systems   Constitutional: Negative for activity change, appetite change, fatigue, fever and unexpected weight change.   HENT: Negative for hearing loss, nosebleeds, trouble swallowing and voice change.    Eyes: Negative for visual disturbance.   Respiratory: Negative for cough, shortness of breath and wheezing.    Cardiovascular: Negative for chest  "pain and palpitations.   Gastrointestinal: Negative for abdominal pain, diarrhea, nausea and vomiting.   Genitourinary: Negative for difficulty urinating, frequency, hematuria and urgency.   Musculoskeletal: Negative for back pain and neck pain.   Skin: Negative for rash.   Neurological: Negative for dizziness, seizures, syncope and headaches.   Hematological: Negative for adenopathy. Does not bruise/bleed easily.   Psychiatric/Behavioral: Negative for behavioral problems. The patient is not nervous/anxious.      A comprehensive 14 point review of systems was performed and was negative except as mentioned.    Medications:  The current medication list was reviewed in the EMR    ALLERGIES:  No Known Allergies    Objective      Vitals:    11/02/22 0959   BP: 131/77   Pulse: 69   Resp: 16   Temp: 97.3 °F (36.3 °C)   TempSrc: Temporal   SpO2: 99%   Weight: 55 kg (121 lb 4.8 oz)   Height: 160 cm (62.99\")   PainSc: 0-No pain     Current Status 11/2/2022   ECOG score 0       Physical Exam   Constitutional: She is oriented to person, place, and time. She appears well-developed. No distress.   HENT:   Head: Normocephalic.   Eyes: Pupils are equal, round, and reactive to light.   Neck: No JVD present. No thyromegaly present.   Cardiovascular: Normal rate and regular rhythm. Exam reveals no gallop and no friction rub.   No murmur heard.  Pulmonary/Chest: Effort normal and breath sounds normal. She has no wheezes. She has no rales. Right breast exhibits no mass, no nipple discharge and no skin change. Left breast exhibits no mass, no nipple discharge and no skin change.   Abdominal: Soft. Bowel sounds are normal. She exhibits no distension and no mass. There is no abdominal tenderness.   Musculoskeletal: No deformity.   Neurological: She is alert and oriented to person, place, and time. She has normal reflexes. No cranial nerve deficit.   Skin: Skin is dry. No rash noted.   Psychiatric: Judgment normal.     I have reexamined the " patient and the results are consistent with the previously documented exam. Michael James MD       RECENT LABS:  Hematology WBC   Date Value Ref Range Status   08/10/2022 5.47 3.40 - 10.80 10*3/mm3 Final   04/14/2022 5.2 3.4 - 10.8 x10E3/uL Final     RBC   Date Value Ref Range Status   08/10/2022 4.61 3.77 - 5.28 10*6/mm3 Final   04/14/2022 4.87 3.77 - 5.28 x10E6/uL Final     Hemoglobin   Date Value Ref Range Status   08/10/2022 13.8 12.0 - 15.9 g/dL Final     Hematocrit   Date Value Ref Range Status   08/10/2022 42.7 34.0 - 46.6 % Final     Platelets   Date Value Ref Range Status   08/10/2022 197 140 - 450 10*3/mm3 Final          PATHOLOGY 7/22/2022  Final Diagnosis   1-2. Lymph Node, Right Axilla, Core Biopsies for Adenopathy ([id number]): SMALL LYMPHOCYTIC LYMPHOMA (SLL/CLL).     Special Stains    Utilizing appropriate controls, multiple immunohistochemical stains are performed including CD3, CD5, CD10, CD20, CD79A, BCL-2, BCL-6 and Cyclin D1.  The neoplastic B cells stain positive for CD20, CD79A and BCL-2.  CD3 highlights interspersed T cells.  Cyclin D1 is negative. CD5 appears positive by IHC.   [facility] [place]    [id number]: IMMUNOPHENOTYPING REVEALS A CD5 NEGATIVE, CD10 NEGATIVE MONOCLONAL KAPPA B CELL POPULATION WITHIN THE SMALL CELL REGION REPRESENTING 17 % OF TOTAL EVENTS.     IMAGING:  F-18 FDG PET SKULL BASE TO MID THIGH WITH PET CT FUSION 8/4/2022  IMPRESSION:  1. The most intensely hypermetabolic lymphadenopathy is at the  retroperitoneum and the intensity is slightly greater than that of blood  pool. The rest of the lymphadenopathy has blood pool range metabolic  activity. There is no suspicious splenic or bone activity.  2. Conservative surveillance of the 1.4 cm anterior hepatic segment  lesion is recommended with a contrast-enhanced CT of the abdomen in 3-4  Months.    CT CHEST, ABDOMEN, AND PELVIS WITH IV CONTRAST 7/8/2022  IMPRESSION:  Impression:  1.  Adenopathy throughout  the chest abdomen and pelvis concerning for  lymphoproliferative process, however, metastasis is not entirely  excluded given history. Recommend biopsy for definitive diagnosis,  axillary or left para-aortic adenopathy may be most amenable to  percutaneous sampling.  2.  Indeterminate hypodense lesion in the right hepatic lobe. If there  are no priors available for comparison followup with liver protocol  imaging could be considered.  3.  Right thyroid mass please correlate with recent biopsy.  4.  Bilateral nephrolithiasis with mild pelvocaliectasis. Multiple renal  lesions the largest compatible with cysts the others too small to  completely characterize.  5.  Please see above for additional findings/recommendations.      CONTRAST-ENHANCED CT SCAN OF THE NECK 05/20/2022  IMPRESSION:  1. There is cervical spondylosis as described above with uncovertebral  joint spurs mild to moderately narrowing the left neural foramen at C3-4  and there is posterior spurring and uncovertebral joint hypertrophy  contributing to mild canal and mild-to-moderate left and moderate right  bony foraminal narrowing at C5-C6 and there is mild canal and mild left  and mild-to-moderate right bony foraminal narrowing at C6-7.  2. There is a mass replacing the majority of the mid and inferior right  lobe of the thyroid that measures 2.7 x 2.3 x 4.4 cm in size.  It has  some areas of central necrosis and inferiorly it has some cystic change.   It is an indeterminate thyroid mass and I recommend a thyroid  ultrasound and the mass could be needle aspirated if clinically  indicated.  3. There are multiple small lymph nodes tracking up and down the  posterior cervical triangles of the neck bilaterally, right greater than  left.  They are more numerous than typically seen, but none of them  appear significantly enlarged.  The single largest right posterior  cervical triangle is deep to the BB placed on the skin at the site of  the palpable  abnormality and measures 9 x 6 x 11 mm in size and is  borderline in size.  Furthermore, there are multiple small  supraclavicular nodes and retropectoral nodes and superior axillary  nodes more numerous than typically seen and some of the retropectoral  and high axillary nodes are borderline pathologic by size as well.  The  lymphadenopathy in the retropectoral and high axillary regions could be  secondary to low-grade lymphoproliferative process or reactive nodes  from systemic illness.  I recommend a chest, abdomen and pelvis CT to  further evaluate the nodes and clinical correlation with the findings.  The remainder of the neck CT is unremarkable.      SCREENING MAMMOGRAM WITH R2 COMPUTERIZED ASSISTED DETECTION AND DIGITAL  TOMOSYNTHESIS 8/17/2022  FINDINGS:  There are scattered areas of fibroglandular density.     There are no suspicious masses, calcifications, or areas of  architectural distortion.      IMPRESSION/RECOMMENDATION(S):  No mammographic evidence of malignancy. Recommend annual screening  mammogram in one year.     BI-RADS Category 1: Negative     Assessment & Plan     ASSESSMENT:   1. 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 She continues to do well with no evidence of malignancy on exam or on her mammograms.   2.  Small lymphocytic lymphoma with no bulky lymphadenopathy.  3.  Right lobe thyroid mass.  Patient had an FNA 7/7/2022 with pathology showing follicular lesion of undetermined significance ([place] category 3).  Patient has been seen by Dr. Arden Whitley in ENT and may be undergoing a partial thyroidectomy.    PLAN:   1. We will continue observation for now and plan to see patient back in the office in 3 months with repeat CT scans of the neck chest abdomen pelvis and labs performed 1 week prior to that visit.  2. I explained to the patient that this need not interfere with any plans that Dr. Whitley has  for further evaluation and treatment of her thyroid nodule.                11/2/2022      CC:

## 2022-11-07 ENCOUNTER — APPOINTMENT (OUTPATIENT)
Dept: BONE DENSITY | Facility: HOSPITAL | Age: 72
End: 2022-11-07

## 2022-11-07 DIAGNOSIS — Z78.0 POST-MENOPAUSAL: ICD-10-CM

## 2022-11-07 PROCEDURE — 77080 DXA BONE DENSITY AXIAL: CPT

## 2022-11-08 NOTE — PROGRESS NOTES
Hi Mrs. Palomino-- your dexa scan results are normal. If you have any questions, please let me know. Have a great day-- LIZBETH Navarro

## 2022-11-22 ENCOUNTER — TELEPHONE (OUTPATIENT)
Dept: INTERNAL MEDICINE | Facility: CLINIC | Age: 72
End: 2022-11-22

## 2022-11-22 DIAGNOSIS — E78.2 MIXED HYPERLIPIDEMIA: ICD-10-CM

## 2022-11-22 RX ORDER — ATORVASTATIN CALCIUM 20 MG/1
20 TABLET, FILM COATED ORAL DAILY
Qty: 90 TABLET | Refills: 0 | Status: SHIPPED | OUTPATIENT
Start: 2022-11-22 | End: 2023-02-20

## 2022-11-22 NOTE — TELEPHONE ENCOUNTER
Caller: Mag Palomino    Relationship: Self    Best call back number: [phone]    Requested Prescriptions:   Requested Prescriptions     Pending Prescriptions Disp Refills   • atorvastatin (LIPITOR) 20 MG tablet 90 tablet 0     Sig: Take 1 tablet by mouth Daily.        Pharmacy where request should be sent: [facility] [id number] [facility] [address] AT [facility] [address] - [phone]  - [phone]     Additional details provided by patient: PATIENT IS CALLING TO REQUEST A NEW 90 DAY SUPPLY WITH REFILLS FOR THE ABOVE MEDICATION    Does the patient have less than a 3 day supply:  [] Yes  [x] No    Allison Moraes, RegSched Rep   11/22/22 14:52 EST     PLEASE ADVISE.

## 2022-12-13 ENCOUNTER — TRANSCRIBE ORDERS (OUTPATIENT)
Dept: ADMINISTRATIVE | Facility: HOSPITAL | Age: 72
End: 2022-12-13

## 2022-12-13 DIAGNOSIS — E04.1 NONTOXIC UNINODULAR GOITER: Primary | ICD-10-CM

## 2023-01-18 ENCOUNTER — HOSPITAL ENCOUNTER (OUTPATIENT)
Dept: ULTRASOUND IMAGING | Facility: HOSPITAL | Age: 73
Discharge: HOME | End: 2023-01-18
Admitting: OTOLARYNGOLOGY
Payer: MEDICARE

## 2023-01-18 DIAGNOSIS — E04.1 NONTOXIC UNINODULAR GOITER: ICD-10-CM

## 2023-01-18 PROCEDURE — 76536 US EXAM OF HEAD AND NECK: CPT

## 2023-01-19 ENCOUNTER — HOSPITAL ENCOUNTER (OUTPATIENT)
Dept: CT IMAGING | Facility: HOSPITAL | Age: 73
Discharge: HOME | End: 2023-01-19
Admitting: INTERNAL MEDICINE
Payer: MEDICARE

## 2023-01-19 DIAGNOSIS — D05.02 NEOPLASM OF LEFT BREAST, PRIMARY TUMOR STAGING CATEGORY TIS: LOBULAR CARCINOMA IN SITU (LCIS): ICD-10-CM

## 2023-01-19 DIAGNOSIS — C83.00 SMALL LYMPHOCYTIC LYMPHOMA: ICD-10-CM

## 2023-01-19 LAB
ALBUMIN SERPL-MCNC: 4.9 G/DL (ref 3.5–5.2)
ALBUMIN/GLOB SERPL: 1.8 G/DL
ALP SERPL-CCNC: 58 U/L (ref 39–117)
ALT SERPL W P-5'-P-CCNC: 23 U/L (ref 1–33)
ANION GAP SERPL CALCULATED.3IONS-SCNC: 8 MMOL/L (ref 5–15)
AST SERPL-CCNC: 26 U/L (ref 1–32)
BASOPHILS # BLD AUTO: 0.04 10*3/MM3 (ref 0–0.2)
BASOPHILS NFR BLD AUTO: 0.7 % (ref 0–1.5)
BILIRUB SERPL-MCNC: 0.6 MG/DL (ref 0–1.2)
BUN SERPL-MCNC: 17 MG/DL (ref 8–23)
BUN/CREAT SERPL: 23.6 (ref 7–25)
CALCIUM SPEC-SCNC: 9.9 MG/DL (ref 8.6–10.5)
CHLORIDE SERPL-SCNC: 105 MMOL/L (ref 98–107)
CO2 SERPL-SCNC: 29 MMOL/L (ref 22–29)
CREAT BLDA-MCNC: 0.8 MG/DL (ref 0.6–1.3)
CREAT SERPL-MCNC: 0.72 MG/DL (ref 0.57–1)
DEPRECATED RDW RBC AUTO: 40.7 FL (ref 37–54)
EGFRCR SERPLBLD CKD-EPI 2021: 89.5 ML/MIN/1.73
EOSINOPHIL # BLD AUTO: 0.08 10*3/MM3 (ref 0–0.4)
EOSINOPHIL NFR BLD AUTO: 1.5 % (ref 0.3–6.2)
ERYTHROCYTE [DISTWIDTH] IN BLOOD BY AUTOMATED COUNT: 12.4 % (ref 12.3–15.4)
GLOBULIN UR ELPH-MCNC: 2.7 GM/DL
GLUCOSE SERPL-MCNC: 92 MG/DL (ref 65–99)
HCT VFR BLD AUTO: 45.2 % (ref 34–46.6)
HGB BLD-MCNC: 14.9 G/DL (ref 12–15.9)
IMM GRANULOCYTES # BLD AUTO: 0.01 10*3/MM3 (ref 0–0.05)
IMM GRANULOCYTES NFR BLD AUTO: 0.2 % (ref 0–0.5)
LYMPHOCYTES # BLD AUTO: 1.67 10*3/MM3 (ref 0.7–3.1)
LYMPHOCYTES NFR BLD AUTO: 30.9 % (ref 19.6–45.3)
MCH RBC QN AUTO: 30 PG (ref 26.6–33)
MCHC RBC AUTO-ENTMCNC: 33 G/DL (ref 31.5–35.7)
MCV RBC AUTO: 90.9 FL (ref 79–97)
MONOCYTES # BLD AUTO: 0.46 10*3/MM3 (ref 0.1–0.9)
MONOCYTES NFR BLD AUTO: 8.5 % (ref 5–12)
NEUTROPHILS NFR BLD AUTO: 3.15 10*3/MM3 (ref 1.7–7)
NEUTROPHILS NFR BLD AUTO: 58.2 % (ref 42.7–76)
NRBC BLD AUTO-RTO: 0 /100 WBC (ref 0–0.2)
PLATELET # BLD AUTO: 202 10*3/MM3 (ref 140–450)
PMV BLD AUTO: 9.8 FL (ref 6–12)
POTASSIUM SERPL-SCNC: 4.2 MMOL/L (ref 3.5–5.2)
PROT SERPL-MCNC: 7.6 G/DL (ref 6–8.5)
RBC # BLD AUTO: 4.97 10*6/MM3 (ref 3.77–5.28)
SODIUM SERPL-SCNC: 142 MMOL/L (ref 136–145)
WBC NRBC COR # BLD: 5.41 10*3/MM3 (ref 3.4–10.8)

## 2023-01-19 PROCEDURE — 83521 IG LIGHT CHAINS FREE EACH: CPT | Performed by: INTERNAL MEDICINE

## 2023-01-19 PROCEDURE — 70491 CT SOFT TISSUE NECK W/DYE: CPT

## 2023-01-19 PROCEDURE — 25010000002 IOPAMIDOL 61 % SOLUTION: Performed by: INTERNAL MEDICINE

## 2023-01-19 PROCEDURE — 86334 IMMUNOFIX E-PHORESIS SERUM: CPT | Performed by: INTERNAL MEDICINE

## 2023-01-19 PROCEDURE — 82784 ASSAY IGA/IGD/IGG/IGM EACH: CPT | Performed by: INTERNAL MEDICINE

## 2023-01-19 PROCEDURE — 71260 CT THORAX DX C+: CPT

## 2023-01-19 PROCEDURE — 84165 PROTEIN E-PHORESIS SERUM: CPT | Performed by: INTERNAL MEDICINE

## 2023-01-19 PROCEDURE — 80053 COMPREHEN METABOLIC PANEL: CPT | Performed by: INTERNAL MEDICINE

## 2023-01-19 PROCEDURE — 74177 CT ABD & PELVIS W/CONTRAST: CPT

## 2023-01-19 PROCEDURE — 82565 ASSAY OF CREATININE: CPT

## 2023-01-19 PROCEDURE — 85025 COMPLETE CBC W/AUTO DIFF WBC: CPT | Performed by: INTERNAL MEDICINE

## 2023-01-19 RX ADMIN — IOPAMIDOL 95 ML: 612 INJECTION, SOLUTION INTRAVENOUS at 09:36

## 2023-01-21 LAB
ALBUMIN SERPL ELPH-MCNC: 4.4 G/DL (ref 2.9–4.4)
ALBUMIN/GLOB SERPL: 1.7 {RATIO} (ref 0.7–1.7)
ALPHA1 GLOB SERPL ELPH-MCNC: 0.2 G/DL (ref 0–0.4)
ALPHA2 GLOB SERPL ELPH-MCNC: 0.8 G/DL (ref 0.4–1)
B-GLOBULIN SERPL ELPH-MCNC: 0.9 G/DL (ref 0.7–1.3)
GAMMA GLOB SERPL ELPH-MCNC: 0.7 G/DL (ref 0.4–1.8)
GLOBULIN SER-MCNC: 2.7 G/DL (ref 2.2–3.9)
IGA SERPL-MCNC: 33 MG/DL (ref 64–422)
IGG SERPL-MCNC: 780 MG/DL (ref 586–1602)
IGM SERPL-MCNC: 79 MG/DL (ref 26–217)
INTERPRETATION SERPL IEP-IMP: ABNORMAL
KAPPA LC FREE SER-MCNC: 17.3 MG/L (ref 3.3–19.4)
KAPPA LC FREE/LAMBDA FREE SER: 2.11 {RATIO} (ref 0.26–1.65)
LABORATORY COMMENT REPORT: ABNORMAL
LAMBDA LC FREE SERPL-MCNC: 8.2 MG/L (ref 5.7–26.3)
M PROTEIN SERPL ELPH-MCNC: ABNORMAL G/DL
PROT SERPL-MCNC: 7.1 G/DL (ref 6–8.5)

## 2023-01-26 ENCOUNTER — OFFICE VISIT (OUTPATIENT)
Dept: ONCOLOGY | Facility: CLINIC | Age: 73
End: 2023-01-26
Payer: MEDICARE

## 2023-01-26 VITALS
OXYGEN SATURATION: 99 % | WEIGHT: 122.5 LBS | RESPIRATION RATE: 18 BRPM | HEIGHT: 63 IN | DIASTOLIC BLOOD PRESSURE: 73 MMHG | HEART RATE: 76 BPM | SYSTOLIC BLOOD PRESSURE: 129 MMHG | TEMPERATURE: 97.3 F | BODY MASS INDEX: 21.71 KG/M2

## 2023-01-26 DIAGNOSIS — C83.00 SMALL LYMPHOCYTIC LYMPHOMA: Primary | ICD-10-CM

## 2023-01-26 DIAGNOSIS — D05.02 NEOPLASM OF LEFT BREAST, PRIMARY TUMOR STAGING CATEGORY TIS: LOBULAR CARCINOMA IN SITU (LCIS): ICD-10-CM

## 2023-01-26 PROCEDURE — 99214 OFFICE O/P EST MOD 30 MIN: CPT | Performed by: INTERNAL MEDICINE

## 2023-01-26 NOTE — PROGRESS NOTES
Subjective   REASON FOR FOLLOWUP:   1.  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  New diagnosis of small lymphocytic lymphoma from axillary lymph node biopsy 7/22/2022    HISTORY OF PRESENT ILLNESS:   The patient is a 72 y.o. female with the above-noted history of LCIS of the left breast in 2000. She had lumpectomy and took 5 years of tamoxifen as breast cancer prevention. She finished her tamoxifen 17 years ago .     She had been seeing us annually in December but she had a recent visit to her primary care office for a palpable small lymph node in the right neck.  This led to a CT scan of the neck which showed some scattered lymphadenopathy and a thyroid mass.  Because of the scattered lymphadenopathy the radiologist recommended CT scans of the chest abdomen pelvis to evaluate for lymphoproliferative disorder.  The scans were performed on 7/8/2022 showing diffuse scattered lymphadenopathy.    She subsequently underwent a CT-guided axillary lymph node biopsy on 7/22/2022 with pathology returning a small lymphocytic lymphoma.    INTERVAL HISTORY:  She returns today for 3-month follow-up with 6-month interval surveillance CT scans of the neck chest abdomen pelvis.  Her areas of lymphadenopathy on the scan performed 1/19/2023 are essentially stable.  Her thyroid mass also appears stable.  She will be following up with Dr. Arden Whitley of ENT later this week.    She reports she is still feeling fine and at this time we do not see any compelling reason to put her on specific treatment.  We did discuss that if and when we need to treat her we likely would start with single agent Rituxan.    History of Present Illness     Past Medical History, Past Surgical History, Social History, Family History have been reviewed and are without significant changes except as mentioned.    Review of Systems   Constitutional: Negative for activity change, appetite change, fatigue, fever and  "unexpected weight change.   HENT: Negative for hearing loss, nosebleeds, trouble swallowing and voice change.    Eyes: Negative for visual disturbance.   Respiratory: Negative for cough, shortness of breath and wheezing.    Cardiovascular: Negative for chest pain and palpitations.   Gastrointestinal: Negative for abdominal pain, diarrhea, nausea and vomiting.   Genitourinary: Negative for difficulty urinating, frequency, hematuria and urgency.   Musculoskeletal: Negative for back pain and neck pain.   Skin: Negative for rash.   Neurological: Negative for dizziness, seizures, syncope and headaches.   Hematological: Negative for adenopathy. Does not bruise/bleed easily.   Psychiatric/Behavioral: Negative for behavioral problems. The patient is not nervous/anxious.      A comprehensive 14 point review of systems was performed and was negative except as mentioned.    Medications:  The current medication list was reviewed in the EMR    ALLERGIES:  No Known Allergies    Objective      Vitals:    01/26/23 1409   BP: 129/73   Pulse: 76   Resp: 18   Temp: 97.3 °F (36.3 °C)   TempSrc: Temporal   SpO2: 99%   Weight: 55.6 kg (122 lb 8 oz)   Height: 160 cm (62.99\")   PainSc: 0-No pain     Current Status 1/26/2023   ECOG score 0       Physical Exam   Constitutional: She is oriented to person, place, and time. She appears well-developed. No distress.   HENT:   Head: Normocephalic.   Eyes: Pupils are equal, round, and reactive to light.   Neck: No JVD present. No thyromegaly present.   Cardiovascular: Normal rate and regular rhythm. Exam reveals no gallop and no friction rub.   No murmur heard.  Pulmonary/Chest: Effort normal and breath sounds normal. She has no wheezes. She has no rales. Right breast exhibits no mass, no nipple discharge and no skin change. Left breast exhibits no mass, no nipple discharge and no skin change.   Abdominal: Soft. Bowel sounds are normal. She exhibits no distension and no mass. There is no abdominal " tenderness.   Musculoskeletal: No deformity.   Neurological: She is alert and oriented to person, place, and time. She has normal reflexes. No cranial nerve deficit.   Skin: Skin is dry. No rash noted.   Psychiatric: Judgment normal.     I have reexamined the patient and the results are consistent with the previously documented exam. Michael James MD       RECENT LABS:  Hematology WBC   Date Value Ref Range Status   01/19/2023 5.41 3.40 - 10.80 10*3/mm3 Final   04/14/2022 5.2 3.4 - 10.8 x10E3/uL Final     RBC   Date Value Ref Range Status   01/19/2023 4.97 3.77 - 5.28 10*6/mm3 Final   04/14/2022 4.87 3.77 - 5.28 x10E6/uL Final     Hemoglobin   Date Value Ref Range Status   01/19/2023 14.9 12.0 - 15.9 g/dL Final     Hematocrit   Date Value Ref Range Status   01/19/2023 45.2 34.0 - 46.6 % Final     Platelets   Date Value Ref Range Status   01/19/2023 202 140 - 450 10*3/mm3 Final          PATHOLOGY 7/22/2022  Final Diagnosis   1-2. Lymph Node, Right Axilla, Core Biopsies for Adenopathy ([id number]): SMALL LYMPHOCYTIC LYMPHOMA (SLL/CLL).     Special Stains    Utilizing appropriate controls, multiple immunohistochemical stains are performed including CD3, CD5, CD10, CD20, CD79A, BCL-2, BCL-6 and Cyclin D1.  The neoplastic B cells stain positive for CD20, CD79A and BCL-2.  CD3 highlights interspersed T cells.  Cyclin D1 is negative. CD5 appears positive by IHC.   [facility] [place]    Flow Cytometry Summary    [id number]: IMMUNOPHENOTYPING REVEALS A CD5 NEGATIVE, CD10 NEGATIVE MONOCLONAL KAPPA B CELL POPULATION WITHIN THE SMALL CELL REGION REPRESENTING 17 % OF TOTAL EVENTS.     IMAGING:  CT NECK, CHEST, ABDOMEN, AND PELVIS WITH IV CONTRAST  1/19/2023  IMPRESSION:  1.  No significant change in adenopathy along for differences in  technique.  2.  Stable indeterminant lesion in the right hepatic lobe which can  better be characterized with MRI or multiphase imaging.  3.  Stable right thyroid mass, please correlate with recent  tissue  diagnosis and follow-up as clinically indicated.  4.  Please see above for additional findings.    F-18 FDG PET SKULL BASE TO MID THIGH WITH PET CT FUSION 8/4/2022  IMPRESSION:  1. The most intensely hypermetabolic lymphadenopathy is at the  retroperitoneum and the intensity is slightly greater than that of blood  pool. The rest of the lymphadenopathy has blood pool range metabolic  activity. There is no suspicious splenic or bone activity.  2. Conservative surveillance of the 1.4 cm anterior hepatic segment  lesion is recommended with a contrast-enhanced CT of the abdomen in 3-4  Months.    CT CHEST, ABDOMEN, AND PELVIS WITH IV CONTRAST 7/8/2022  IMPRESSION:  Impression:  1.  Adenopathy throughout the chest abdomen and pelvis concerning for  lymphoproliferative process, however, metastasis is not entirely  excluded given history. Recommend biopsy for definitive diagnosis,  axillary or left para-aortic adenopathy may be most amenable to  percutaneous sampling.  2.  Indeterminate hypodense lesion in the right hepatic lobe. If there  are no priors available for comparison followup with liver protocol  imaging could be considered.  3.  Right thyroid mass please correlate with recent biopsy.  4.  Bilateral nephrolithiasis with mild pelvocaliectasis. Multiple renal  lesions the largest compatible with cysts the others too small to  completely characterize.  5.  Please see above for additional findings/recommendations.    SCREENING MAMMOGRAM WITH R2 COMPUTERIZED ASSISTED DETECTION AND DIGITAL  TOMOSYNTHESIS 8/17/2022  FINDINGS:  There are scattered areas of fibroglandular density.     There are no suspicious masses, calcifications, or areas of  architectural distortion.      IMPRESSION/RECOMMENDATION(S):  No mammographic evidence of malignancy. Recommend annual screening  mammogram in one year.     BI-RADS Category 1: Negative     Assessment & Plan     ASSESSMENT:   1. Lobular carcinoma in situ of the left breast,  status post excision and 5 years of tamoxifen, which she completed in April 2005. She continues to do well with no evidence of malignancy on exam or on her mammograms.   2.  Small lymphocytic lymphoma with no bulky lymphadenopathy.  3.  Right lobe thyroid mass.  Patient had an FNA 7/7/2022 with pathology showing follicular lesion of undetermined significance ([place] category 3).  Patient is being followed by Dr. Arden Whitley.    PLAN:   1. We reviewed the results of the CT scans and labs from 1/19/2023 with the patient and provided her printed copy of the reports.  Currently at this time she appears to be stable and asymptomatic therefore we will continue to observe her without initiation of treatment at this time.  2. We will schedule MD follow-up in our office in 6 months with labs and CT scans of the neck chest abdomen and pelvis performed 1 week prior to that visit.  3. We discussed that if we do treat her in the future he likely will involve single agent Rituxan therapy weekly for 4 weeks followed by maintenance treatment if she has good tolerance and response.  4. She will be following up with Dr. Arden Whitley of ENT tomorrow to further discuss her right thyroid mass.                  1/26/2023      CC:

## 2023-02-19 DIAGNOSIS — E78.2 MIXED HYPERLIPIDEMIA: ICD-10-CM

## 2023-02-20 RX ORDER — ATORVASTATIN CALCIUM 20 MG/1
TABLET, FILM COATED ORAL
Qty: 90 TABLET | Refills: 0 | Status: SHIPPED | OUTPATIENT
Start: 2023-02-20

## 2023-04-17 ENCOUNTER — PRE-ADMISSION TESTING (OUTPATIENT)
Dept: PREADMISSION TESTING | Facility: HOSPITAL | Age: 73
End: 2023-04-17
Payer: MEDICARE

## 2023-04-17 VITALS
HEART RATE: 85 BPM | SYSTOLIC BLOOD PRESSURE: 141 MMHG | RESPIRATION RATE: 16 BRPM | DIASTOLIC BLOOD PRESSURE: 73 MMHG | OXYGEN SATURATION: 99 % | TEMPERATURE: 98 F | HEIGHT: 63 IN | BODY MASS INDEX: 21.62 KG/M2 | WEIGHT: 122 LBS

## 2023-04-17 LAB
ANION GAP SERPL CALCULATED.3IONS-SCNC: 8 MMOL/L (ref 5–15)
BUN SERPL-MCNC: 18 MG/DL (ref 8–23)
BUN/CREAT SERPL: 23.7 (ref 7–25)
CALCIUM SPEC-SCNC: 9.4 MG/DL (ref 8.6–10.5)
CHLORIDE SERPL-SCNC: 104 MMOL/L (ref 98–107)
CO2 SERPL-SCNC: 27 MMOL/L (ref 22–29)
CREAT SERPL-MCNC: 0.76 MG/DL (ref 0.57–1)
DEPRECATED RDW RBC AUTO: 40.8 FL (ref 37–54)
EGFRCR SERPLBLD CKD-EPI 2021: 83.4 ML/MIN/1.73
ERYTHROCYTE [DISTWIDTH] IN BLOOD BY AUTOMATED COUNT: 12.5 % (ref 12.3–15.4)
GLUCOSE SERPL-MCNC: 105 MG/DL (ref 65–99)
HCT VFR BLD AUTO: 41.5 % (ref 34–46.6)
HGB BLD-MCNC: 13.9 G/DL (ref 12–15.9)
MCH RBC QN AUTO: 29.8 PG (ref 26.6–33)
MCHC RBC AUTO-ENTMCNC: 33.5 G/DL (ref 31.5–35.7)
MCV RBC AUTO: 89.1 FL (ref 79–97)
PLATELET # BLD AUTO: 210 10*3/MM3 (ref 140–450)
PMV BLD AUTO: 10 FL (ref 6–12)
POTASSIUM SERPL-SCNC: 4.1 MMOL/L (ref 3.5–5.2)
QT INTERVAL: 450 MS
RBC # BLD AUTO: 4.66 10*6/MM3 (ref 3.77–5.28)
SODIUM SERPL-SCNC: 139 MMOL/L (ref 136–145)
WBC NRBC COR # BLD: 5.54 10*3/MM3 (ref 3.4–10.8)

## 2023-04-17 PROCEDURE — 80048 BASIC METABOLIC PNL TOTAL CA: CPT

## 2023-04-17 PROCEDURE — 93005 ELECTROCARDIOGRAM TRACING: CPT

## 2023-04-17 PROCEDURE — 36415 COLL VENOUS BLD VENIPUNCTURE: CPT

## 2023-04-17 PROCEDURE — 85027 COMPLETE CBC AUTOMATED: CPT

## 2023-04-17 NOTE — DISCHARGE INSTRUCTIONS
Take the following medications the morning of surgery:    NO MEDS AM OF SURGERY    TIME OF ARRIAL 5:30      If you are on prescription narcotic pain medication to control your pain you may also take that medication the morning of surgery.    General Instructions:  Do not eat solid food after midnight the night before surgery.  You may drink clear liquids day of surgery but must stop at least one hour before your hospital arrival time.  It is beneficial for you to have a clear drink that contains carbohydrates the day of surgery.  We suggest a 12 to 20 ounce bottle of Gatorade or Powerade for non-diabetic patients or a 12 to 20 ounce bottle of G2 or Powerade Zero for diabetic patients. (Pediatric patients, are not advised to drink a 12 to 20 ounce carbohydrate drink)    Clear liquids are liquids you can see through.  Nothing red in color.     Plain water                               Sports drinks  Sodas                                   Gelatin (Jell-O)  Fruit juices without pulp such as white grape juice and apple juice  Popsicles that contain no fruit or yogurt  Tea or coffee (no cream or milk added)  Gatorade / Powerade  G2 / Powerade Zero        Patients who avoid smoking, chewing tobacco and alcohol for 4 weeks prior to surgery have a reduced risk of post-operative complications.  Quit smoking as many days before surgery as you can.  Do not smoke, use chewing tobacco or drink alcohol the day of surgery.   If applicable bring your C-PAP/ BI-PAP machine in with you to preop day of surgery.  Bring any papers given to you in the doctor’s office.  Wear clean comfortable clothes.  Do not wear contact lenses, false eyelashes or make-up.  Bring a case for your glasses.   Bring crutches or walker if applicable.  Remove all piercings.  Leave jewelry and any other valuables at home.  Hair extensions with metal clips must be removed prior to surgery.  The Pre-Admission Testing nurse will instruct you to bring medications  if unable to obtain an accurate list in Pre-Admission Testing.            Preventing a Surgical Site Infection:  For 2 to 3 days before surgery, avoid shaving with a razor because the razor can irritate skin and make it easier to develop an infection.    Any areas of open skin can increase the risk of a post-operative wound infection by allowing bacteria to enter and travel throughout the body.  Notify your surgeon if you have any skin wounds / rashes even if it is not near the expected surgical site.  The area will need assessed to determine if surgery should be delayed until it is healed.  The night prior to surgery shower using a fresh bar of anti-bacterial soap (such as Dial) and clean washcloth.  Sleep in a clean bed with clean clothing.  Do not allow pets to sleep with you.  Shower on the morning of surgery using a fresh bar of anti-bacterial soap (such as Dial) and clean washcloth.  Dry with a clean towel and dress in clean clothing.  Ask your surgeon if you will be receiving antibiotics prior to surgery.  Make sure you, your family, and all healthcare providers clean their hands with soap and water or an alcohol based hand  before caring for you or your wound.    Day of surgery:  Your arrival time is approximately two hours before your scheduled surgery time.  Upon arrival, a Pre-op nurse and Anesthesiologist will review your health history, obtain vital signs, and answer questions you may have.  The only belongings needed at this time will be a list of your home medications and if applicable your C-PAP/BI-PAP machine.  A Pre-op nurse will start an IV and you may receive medication in preparation for surgery, including something to help you relax.     Please be aware that surgery does come with discomfort.  We want to make every effort to control your discomfort so please discuss any uncontrolled symptoms with your nurse.   Your doctor will most likely have prescribed pain medications.      If you  are going home after surgery you will receive individualized written care instructions before being discharged.  A responsible adult must drive you to and from the hospital on the day of your surgery and stay with you for 24 hours.  Discharge prescriptions can be filled by the hospital pharmacy during regular pharmacy hours.  If you are having surgery late in the day/evening your prescription may be e-prescribed to your pharmacy.  Please verify your pharmacy hours or chose a 24 hour pharmacy to avoid not having access to your prescription because your pharmacy has closed for the day.    If you are staying overnight following surgery, you will be transported to your hospital room following the recovery period.  [facility] has all private rooms.    If you have any questions please call Pre-Admission Testing at [phone].  Deductibles and co-payments are collected on the day of service. Please be prepared to pay the required co-pay, deductible or deposit on the day of service as defined by your plan.    Call your surgeon immediately if you experience any of the following symptoms:  Sore Throat  Shortness of Breath or difficulty breathing  Cough  Chills  Body soreness or muscle pain  Headache  Fever  New loss of taste or smell  Do not arrive for your surgery ill.  Your procedure will need to be rescheduled to another time.  You will need to call your physician before the day of surgery to avoid any unnecessary exposure to hospital staff as well as other patients.

## 2023-04-20 ENCOUNTER — OFFICE VISIT (OUTPATIENT)
Dept: INTERNAL MEDICINE | Facility: CLINIC | Age: 73
End: 2023-04-20
Payer: MEDICARE

## 2023-04-20 VITALS
HEIGHT: 63 IN | WEIGHT: 120 LBS | DIASTOLIC BLOOD PRESSURE: 70 MMHG | BODY MASS INDEX: 21.26 KG/M2 | HEART RATE: 75 BPM | SYSTOLIC BLOOD PRESSURE: 128 MMHG | OXYGEN SATURATION: 99 %

## 2023-04-20 DIAGNOSIS — E07.9 THYROID MASS: Primary | ICD-10-CM

## 2023-04-20 DIAGNOSIS — C83.00 SMALL LYMPHOCYTIC LYMPHOMA: Chronic | ICD-10-CM

## 2023-04-20 DIAGNOSIS — R12 HEARTBURN: Chronic | ICD-10-CM

## 2023-04-20 DIAGNOSIS — J30.2 SEASONAL ALLERGIC RHINITIS, UNSPECIFIED TRIGGER: Chronic | ICD-10-CM

## 2023-04-20 DIAGNOSIS — L70.8 OTHER ACNE: Chronic | ICD-10-CM

## 2023-04-20 DIAGNOSIS — Z85.3 HISTORY OF BREAST CANCER: ICD-10-CM

## 2023-04-20 DIAGNOSIS — E78.5 HYPERLIPIDEMIA, UNSPECIFIED HYPERLIPIDEMIA TYPE: Chronic | ICD-10-CM

## 2023-04-20 PROBLEM — R03.0 BORDERLINE HIGH BLOOD PRESSURE: Status: RESOLVED | Noted: 2017-06-20 | Resolved: 2023-04-20

## 2023-04-20 PROBLEM — L70.9 ACNE: Status: RESOLVED | Noted: 2021-10-28 | Resolved: 2023-04-20

## 2023-04-20 LAB
CHOLEST SERPL-MCNC: 159 MG/DL (ref 0–200)
HDLC SERPL-MCNC: 76 MG/DL (ref 40–60)
LDLC SERPL CALC-MCNC: 68 MG/DL (ref 0–100)
TRIGL SERPL-MCNC: 80 MG/DL (ref 0–150)
VLDLC SERPL CALC-MCNC: 15 MG/DL (ref 5–40)

## 2023-04-20 NOTE — PROGRESS NOTES
"Chief Complaint  Hyperlipidemia    Subjective        Mag Palomino presents to [facility]  History of Present Illness  This is a 74 y/o female presenting to office for f/u with chronic health conditions.     Patient is scheduled to have hemithyroidectomy next week for thyroid mass. Patient will have surgery under Dr. Whitley.     Patient continues following with Dr. James r/t recent dx of small lymphoma. Patient is currently under surveillance; not on current tx. Will f/u in 6 months for repeat imaging.     Hx HLD-- continues on statin. Denies any issues.     Hx acne-- continues on aldactone.     Objective   Vital Signs:  /70 (BP Location: Left arm, Patient Position: Sitting, Cuff Size: Adult)   Pulse 75   Ht 160 cm (63\")   Wt 54.4 kg (120 lb)   SpO2 99%   BMI 21.26 kg/m²   Estimated body mass index is 21.26 kg/m² as calculated from the following:    Height as of this encounter: 160 cm (63\").    Weight as of this encounter: 54.4 kg (120 lb).       BMI is within normal parameters. No other follow-up for BMI required.      Physical Exam  Constitutional:       Appearance: Normal appearance. She is normal weight.   HENT:      Head: Normocephalic and atraumatic.      Right Ear: External ear normal.      Left Ear: External ear normal.   Eyes:      Conjunctiva/sclera: Conjunctivae normal.      Pupils: Pupils are equal, round, and reactive to light.   Cardiovascular:      Rate and Rhythm: Normal rate and regular rhythm.      Pulses: Normal pulses.      Heart sounds: Normal heart sounds. No murmur heard.    No friction rub. No gallop.   Pulmonary:      Effort: Pulmonary effort is normal. No respiratory distress.      Breath sounds: Normal breath sounds. No stridor. No wheezing, rhonchi or rales.   Musculoskeletal:         General: No swelling or deformity. Normal range of motion.      Cervical back: Normal range of motion and neck supple.   Skin:     General: Skin is warm and dry. "      Capillary Refill: Capillary refill takes less than 2 seconds.   Neurological:      General: No focal deficit present.      Mental Status: She is alert and oriented to person, place, and time. Mental status is at baseline.      Motor: No weakness.   Psychiatric:         Mood and Affect: Mood normal.         Thought Content: Thought content normal.         Judgment: Judgment normal.        Result Review :  The following data was reviewed by: LIZBETH Navarro on 04/20/2023:  Common labs        11/2/2022    09:51 1/19/2023    08:42 1/19/2023    08:46 4/17/2023    11:03   Common Labs   Glucose 91   92    105     BUN 17   17    18     Creatinine 0.75   0.72   0.80   0.76     Sodium 141   142    139     Potassium 4.3   4.2    4.1     Chloride 103   105    104     Calcium 9.9   9.9    9.4     Total Protein  7.1       Albumin 4.70   4.9      4.4       Total Bilirubin 0.5   0.6       Alkaline Phosphatase 54   58       AST (SGOT) 25   26       ALT (SGPT) 19   23       WBC 5.68   5.41    5.54     Hemoglobin 13.6   14.9    13.9     Hematocrit 41.2   45.2    41.5     Platelets 207   202    210     Uric Acid 3.8                       Assessment and Plan   Diagnoses and all orders for this visit:    1. Thyroid mass (Primary)  Assessment & Plan:  Scheduled for right hemithyroidectomy under Dr. Whitley next week.         2. Hyperlipidemia, unspecified hyperlipidemia type  Assessment & Plan:  Lipid abnormalities are improving with treatment.  Nutritional counseling was provided. and Pharmacotherapy as ordered.  Lipids will be reassessed in 6 months.    Orders:  -     Lipid panel    3. Small lymphocytic lymphoma  Assessment & Plan:  Following with Dr. James  Scheduled for surveillance imaging  Overall, asymptomatic and doing well.         4. Heartburn  Assessment & Plan:  Continues on prevacid.       5. Other acne  Assessment & Plan:  Continues on aldactone.   Following with dermatology.       6. Seasonal allergic rhinitis,  unspecified trigger  Assessment & Plan:  Continues on zyrtec.       7. History of breast cancer           Follow Up   Return in about 6 months (around 10/20/2023) for Medicare Wellness.  Patient was given instructions and counseling regarding her condition or for health maintenance advice. Please see specific information pulled into the AVS if appropriate.

## 2023-04-20 NOTE — ASSESSMENT & PLAN NOTE
Following with Dr. James  Scheduled for surveillance imaging  Overall, asymptomatic and doing well.

## 2023-04-26 NOTE — H&P
[facility]   HISTORY AND PHYSICAL    Patient Name:Mag Palomino  : 1950  MRN: [id number]  Primary Care Physician: Carol Jorge APRN  Date of admission: (Not on file)    Subjective   Subjective     Chief Complaint: Thyroid nodule    History of Present Illness   Mag Palomino is a 73 y.o. female with 4.7 cm TR 4 right thyroid nodule.  Needle biopsy showed Hines class III lesion.    Review of Systems      Personal History     Past Medical History:   Diagnosis Date   • Acne     Derm Dr. Hui   • Anxiety    • Breast cancer 2000    Right lobular (oncology CBC) Dr. James   • Constipation    • Drug therapy    • Fibrocystic breast    • H/O bone density study    • H/O bone density study 10/06/2016   • H/O echocardiogram 2014    Dr De La Torre   • H/O mammogram    • Headache    • History of colonic polyps    • Hyperlipidemia    • Hypertension    • Nocturia    • Palpitation     HX   • Paresthesia of arm    • Personal history of malignant neoplasm of breast    • Piriformis syndrome    • Small lymphocytic lymphoma    • Thyroid nodule        Past Surgical History:   Procedure Laterality Date   • BREAST BIOPSY     • BREAST LUMPECTOMY Right    •  SECTION  1979   • COLONOSCOPY N/A 2015    Dr. Mag York   • ENDOMETRIAL BIOPSY  2005    Normal   • PAP SMEAR  2013   • US GUIDED LYMPH NODE BIOPSY  2022       Family History: Her family history includes Alcohol abuse in her father; Anxiety disorder in her sister; Cancer in her brother and sister; Depression in her sister; Heart attack in her father; Heart disease in her brother, father, and sister; Hyperlipidemia in her mother and another family member; Polycythemia in an other family member; Thyroid disease in her sister and sister; Ulcers in an other family member.     Social History: She  reports that she has never smoked. She has never used smokeless tobacco. She reports current alcohol use of about 1.0 - 2.0 standard  drink per week. She reports that she does not use drugs.    Home Medications:  Multiple Vitamin, acetaminophen, atorvastatin, cetirizine, docusate sodium, fish oil, lansoprazole, and spironolactone    Allergies:  She has No Known Allergies.    Objective    Objective     Vitals:         Physical Exam     Result Review    Result Review:  I have personally reviewed the results from the time of this admission to 4/26/2023 17:22 EDT and agree with these findings:  []  Laboratory list / accordion  []  Microbiology  []  Radiology  []  EKG/Telemetry   []  Cardiology/Vascular   []  Pathology  []  Old records  []  Other:  Most notable findings include: Palpable right thyroid nodule noted.      Assessment & Plan   Assessment / Plan     Brief Patient Summary:  Mag Palomino is a 73 y.o. female with right-sided thyroid nodule that is somewhat suspicious on needle biopsy.    Active Hospital Problems:  There are no active hospital problems to display for this patient.    Plan:   Right thyroid lobectomy.  Risks and benefits discussed.    DVT prophylaxis:  No DVT prophylaxis order currently exists.    Arden Whitley MD

## 2023-04-27 ENCOUNTER — HOSPITAL ENCOUNTER (OUTPATIENT)
Facility: HOSPITAL | Age: 73
Setting detail: HOSPITAL OUTPATIENT SURGERY
Discharge: HOME | End: 2023-04-27
Attending: OTOLARYNGOLOGY | Admitting: ANESTHESIOLOGY
Payer: MEDICARE

## 2023-04-27 ENCOUNTER — ANESTHESIA (OUTPATIENT)
Dept: PERIOP | Facility: HOSPITAL | Age: 73
End: 2023-04-27
Payer: MEDICARE

## 2023-04-27 ENCOUNTER — ANESTHESIA EVENT (OUTPATIENT)
Dept: PERIOP | Facility: HOSPITAL | Age: 73
End: 2023-04-27
Payer: MEDICARE

## 2023-04-27 VITALS
HEIGHT: 63 IN | DIASTOLIC BLOOD PRESSURE: 60 MMHG | BODY MASS INDEX: 21.25 KG/M2 | SYSTOLIC BLOOD PRESSURE: 130 MMHG | TEMPERATURE: 97.6 F | WEIGHT: 119.93 LBS | HEART RATE: 64 BPM | RESPIRATION RATE: 18 BRPM | OXYGEN SATURATION: 100 %

## 2023-04-27 DIAGNOSIS — E04.1 THYROID NODULE: Primary | ICD-10-CM

## 2023-04-27 PROCEDURE — 25010000002 NEOSTIGMINE 5 MG/10ML SOLUTION: Performed by: NURSE ANESTHETIST, CERTIFIED REGISTERED

## 2023-04-27 PROCEDURE — 25010000002 FENTANYL CITRATE (PF) 50 MCG/ML SOLUTION: Performed by: NURSE ANESTHETIST, CERTIFIED REGISTERED

## 2023-04-27 PROCEDURE — 88334 PATH CONSLTJ SURG CYTO XM EA: CPT | Performed by: OTOLARYNGOLOGY

## 2023-04-27 PROCEDURE — 88307 TISSUE EXAM BY PATHOLOGIST: CPT | Performed by: OTOLARYNGOLOGY

## 2023-04-27 PROCEDURE — 25010000002 DEXAMETHASONE SODIUM PHOSPHATE 20 MG/5ML SOLUTION: Performed by: NURSE ANESTHETIST, CERTIFIED REGISTERED

## 2023-04-27 PROCEDURE — 25010000002 MIDAZOLAM PER 1 MG: Performed by: ANESTHESIOLOGY

## 2023-04-27 PROCEDURE — 25010000002 ONDANSETRON PER 1 MG: Performed by: NURSE ANESTHETIST, CERTIFIED REGISTERED

## 2023-04-27 PROCEDURE — 88331 PATH CONSLTJ SURG 1 BLK 1SPC: CPT | Performed by: OTOLARYNGOLOGY

## 2023-04-27 PROCEDURE — 25010000002 PROPOFOL 10 MG/ML EMULSION: Performed by: NURSE ANESTHETIST, CERTIFIED REGISTERED

## 2023-04-27 DEVICE — HORIZON TI MED 6/CART
Type: IMPLANTABLE DEVICE | Site: NECK | Status: FUNCTIONAL
Brand: WECK

## 2023-04-27 RX ORDER — DROPERIDOL 2.5 MG/ML
0.62 INJECTION, SOLUTION INTRAMUSCULAR; INTRAVENOUS
Status: DISCONTINUED | OUTPATIENT
Start: 2023-04-27 | End: 2023-04-27 | Stop reason: HOSPADM

## 2023-04-27 RX ORDER — NEOSTIGMINE METHYLSULFATE 0.5 MG/ML
INJECTION, SOLUTION INTRAVENOUS AS NEEDED
Status: DISCONTINUED | OUTPATIENT
Start: 2023-04-27 | End: 2023-04-27 | Stop reason: SURG

## 2023-04-27 RX ORDER — HYDROCODONE BITARTRATE AND ACETAMINOPHEN 7.5; 325 MG/1; MG/1
1 TABLET ORAL ONCE AS NEEDED
Status: DISCONTINUED | OUTPATIENT
Start: 2023-04-27 | End: 2023-04-27 | Stop reason: HOSPADM

## 2023-04-27 RX ORDER — MAGNESIUM HYDROXIDE 1200 MG/15ML
LIQUID ORAL AS NEEDED
Status: DISCONTINUED | OUTPATIENT
Start: 2023-04-27 | End: 2023-04-27 | Stop reason: HOSPADM

## 2023-04-27 RX ORDER — IPRATROPIUM BROMIDE AND ALBUTEROL SULFATE 2.5; .5 MG/3ML; MG/3ML
3 SOLUTION RESPIRATORY (INHALATION) ONCE AS NEEDED
Status: DISCONTINUED | OUTPATIENT
Start: 2023-04-27 | End: 2023-04-27 | Stop reason: HOSPADM

## 2023-04-27 RX ORDER — EPHEDRINE SULFATE 50 MG/ML
INJECTION INTRAVENOUS AS NEEDED
Status: DISCONTINUED | OUTPATIENT
Start: 2023-04-27 | End: 2023-04-27 | Stop reason: SURG

## 2023-04-27 RX ORDER — SODIUM CHLORIDE 0.9 % (FLUSH) 0.9 %
3-10 SYRINGE (ML) INJECTION AS NEEDED
Status: DISCONTINUED | OUTPATIENT
Start: 2023-04-27 | End: 2023-04-27 | Stop reason: HOSPADM

## 2023-04-27 RX ORDER — HYDROCODONE BITARTRATE AND ACETAMINOPHEN 7.5; 325 MG/1; MG/1
1 TABLET ORAL ONCE AS NEEDED
Status: COMPLETED | OUTPATIENT
Start: 2023-04-27 | End: 2023-04-27

## 2023-04-27 RX ORDER — FAMOTIDINE 10 MG/ML
20 INJECTION, SOLUTION INTRAVENOUS ONCE
Status: COMPLETED | OUTPATIENT
Start: 2023-04-27 | End: 2023-04-27

## 2023-04-27 RX ORDER — LIDOCAINE HYDROCHLORIDE 20 MG/ML
INJECTION, SOLUTION INFILTRATION; PERINEURAL AS NEEDED
Status: DISCONTINUED | OUTPATIENT
Start: 2023-04-27 | End: 2023-04-27 | Stop reason: SURG

## 2023-04-27 RX ORDER — PROPOFOL 10 MG/ML
VIAL (ML) INTRAVENOUS AS NEEDED
Status: DISCONTINUED | OUTPATIENT
Start: 2023-04-27 | End: 2023-04-27 | Stop reason: SURG

## 2023-04-27 RX ORDER — DIPHENHYDRAMINE HYDROCHLORIDE 50 MG/ML
12.5 INJECTION INTRAMUSCULAR; INTRAVENOUS
Status: DISCONTINUED | OUTPATIENT
Start: 2023-04-27 | End: 2023-04-27 | Stop reason: HOSPADM

## 2023-04-27 RX ORDER — MIDAZOLAM HYDROCHLORIDE 1 MG/ML
0.5 INJECTION INTRAMUSCULAR; INTRAVENOUS
Status: COMPLETED | OUTPATIENT
Start: 2023-04-27 | End: 2023-04-27

## 2023-04-27 RX ORDER — PROMETHAZINE HYDROCHLORIDE 25 MG/1
25 SUPPOSITORY RECTAL ONCE AS NEEDED
Status: DISCONTINUED | OUTPATIENT
Start: 2023-04-27 | End: 2023-04-27 | Stop reason: HOSPADM

## 2023-04-27 RX ORDER — SODIUM CHLORIDE, SODIUM LACTATE, POTASSIUM CHLORIDE, CALCIUM CHLORIDE 600; 310; 30; 20 MG/100ML; MG/100ML; MG/100ML; MG/100ML
9 INJECTION, SOLUTION INTRAVENOUS CONTINUOUS
Status: DISCONTINUED | OUTPATIENT
Start: 2023-04-27 | End: 2023-04-27 | Stop reason: HOSPADM

## 2023-04-27 RX ORDER — OXYCODONE AND ACETAMINOPHEN 7.5; 325 MG/1; MG/1
1 TABLET ORAL EVERY 4 HOURS PRN
Status: DISCONTINUED | OUTPATIENT
Start: 2023-04-27 | End: 2023-04-27 | Stop reason: HOSPADM

## 2023-04-27 RX ORDER — ROCURONIUM BROMIDE 10 MG/ML
INJECTION, SOLUTION INTRAVENOUS AS NEEDED
Status: DISCONTINUED | OUTPATIENT
Start: 2023-04-27 | End: 2023-04-27 | Stop reason: SURG

## 2023-04-27 RX ORDER — NALOXONE HCL 0.4 MG/ML
0.2 VIAL (ML) INJECTION AS NEEDED
Status: DISCONTINUED | OUTPATIENT
Start: 2023-04-27 | End: 2023-04-27 | Stop reason: HOSPADM

## 2023-04-27 RX ORDER — ONDANSETRON HYDROCHLORIDE 8 MG/1
8 TABLET, FILM COATED ORAL EVERY 8 HOURS PRN
Qty: 10 TABLET | Refills: 0 | Status: SHIPPED | OUTPATIENT
Start: 2023-04-27

## 2023-04-27 RX ORDER — GINSENG 100 MG
CAPSULE ORAL AS NEEDED
Status: DISCONTINUED | OUTPATIENT
Start: 2023-04-27 | End: 2023-04-27 | Stop reason: HOSPADM

## 2023-04-27 RX ORDER — ONDANSETRON 2 MG/ML
INJECTION INTRAMUSCULAR; INTRAVENOUS AS NEEDED
Status: DISCONTINUED | OUTPATIENT
Start: 2023-04-27 | End: 2023-04-27 | Stop reason: SURG

## 2023-04-27 RX ORDER — HYDRALAZINE HYDROCHLORIDE 20 MG/ML
5 INJECTION INTRAMUSCULAR; INTRAVENOUS
Status: DISCONTINUED | OUTPATIENT
Start: 2023-04-27 | End: 2023-04-27 | Stop reason: HOSPADM

## 2023-04-27 RX ORDER — EPHEDRINE SULFATE 50 MG/ML
5 INJECTION, SOLUTION INTRAVENOUS ONCE AS NEEDED
Status: DISCONTINUED | OUTPATIENT
Start: 2023-04-27 | End: 2023-04-27 | Stop reason: HOSPADM

## 2023-04-27 RX ORDER — DEXAMETHASONE SODIUM PHOSPHATE 4 MG/ML
INJECTION, SOLUTION INTRA-ARTICULAR; INTRALESIONAL; INTRAMUSCULAR; INTRAVENOUS; SOFT TISSUE AS NEEDED
Status: DISCONTINUED | OUTPATIENT
Start: 2023-04-27 | End: 2023-04-27 | Stop reason: SURG

## 2023-04-27 RX ORDER — HYDROMORPHONE HYDROCHLORIDE 1 MG/ML
0.5 INJECTION, SOLUTION INTRAMUSCULAR; INTRAVENOUS; SUBCUTANEOUS
Status: DISCONTINUED | OUTPATIENT
Start: 2023-04-27 | End: 2023-04-27 | Stop reason: HOSPADM

## 2023-04-27 RX ORDER — FENTANYL CITRATE 50 UG/ML
50 INJECTION, SOLUTION INTRAMUSCULAR; INTRAVENOUS ONCE AS NEEDED
Status: DISCONTINUED | OUTPATIENT
Start: 2023-04-27 | End: 2023-04-27 | Stop reason: HOSPADM

## 2023-04-27 RX ORDER — ONDANSETRON 2 MG/ML
4 INJECTION INTRAMUSCULAR; INTRAVENOUS ONCE AS NEEDED
Status: DISCONTINUED | OUTPATIENT
Start: 2023-04-27 | End: 2023-04-27 | Stop reason: HOSPADM

## 2023-04-27 RX ORDER — PHENYLEPHRINE HCL IN 0.9% NACL 1 MG/10 ML
SYRINGE (ML) INTRAVENOUS AS NEEDED
Status: DISCONTINUED | OUTPATIENT
Start: 2023-04-27 | End: 2023-04-27 | Stop reason: SURG

## 2023-04-27 RX ORDER — HYDROCODONE BITARTRATE AND ACETAMINOPHEN 5; 325 MG/1; MG/1
1-2 TABLET ORAL EVERY 6 HOURS PRN
Qty: 20 TABLET | Refills: 0 | Status: SHIPPED | OUTPATIENT
Start: 2023-04-27

## 2023-04-27 RX ORDER — GLYCOPYRROLATE 0.2 MG/ML
INJECTION INTRAMUSCULAR; INTRAVENOUS AS NEEDED
Status: DISCONTINUED | OUTPATIENT
Start: 2023-04-27 | End: 2023-04-27 | Stop reason: SURG

## 2023-04-27 RX ORDER — FLUMAZENIL 0.1 MG/ML
0.2 INJECTION INTRAVENOUS AS NEEDED
Status: DISCONTINUED | OUTPATIENT
Start: 2023-04-27 | End: 2023-04-27 | Stop reason: HOSPADM

## 2023-04-27 RX ORDER — PROMETHAZINE HYDROCHLORIDE 25 MG/1
25 TABLET ORAL ONCE AS NEEDED
Status: DISCONTINUED | OUTPATIENT
Start: 2023-04-27 | End: 2023-04-27 | Stop reason: HOSPADM

## 2023-04-27 RX ORDER — FENTANYL CITRATE 50 UG/ML
INJECTION, SOLUTION INTRAMUSCULAR; INTRAVENOUS AS NEEDED
Status: DISCONTINUED | OUTPATIENT
Start: 2023-04-27 | End: 2023-04-27 | Stop reason: SURG

## 2023-04-27 RX ORDER — LIDOCAINE HYDROCHLORIDE AND EPINEPHRINE 10; 10 MG/ML; UG/ML
INJECTION, SOLUTION INFILTRATION; PERINEURAL AS NEEDED
Status: DISCONTINUED | OUTPATIENT
Start: 2023-04-27 | End: 2023-04-27 | Stop reason: HOSPADM

## 2023-04-27 RX ORDER — LIDOCAINE HYDROCHLORIDE 10 MG/ML
0.5 INJECTION, SOLUTION EPIDURAL; INFILTRATION; INTRACAUDAL; PERINEURAL ONCE AS NEEDED
Status: DISCONTINUED | OUTPATIENT
Start: 2023-04-27 | End: 2023-04-27 | Stop reason: HOSPADM

## 2023-04-27 RX ORDER — FENTANYL CITRATE 50 UG/ML
50 INJECTION, SOLUTION INTRAMUSCULAR; INTRAVENOUS
Status: DISCONTINUED | OUTPATIENT
Start: 2023-04-27 | End: 2023-04-27 | Stop reason: HOSPADM

## 2023-04-27 RX ORDER — LABETALOL HYDROCHLORIDE 5 MG/ML
5 INJECTION, SOLUTION INTRAVENOUS
Status: DISCONTINUED | OUTPATIENT
Start: 2023-04-27 | End: 2023-04-27 | Stop reason: HOSPADM

## 2023-04-27 RX ORDER — SODIUM CHLORIDE 0.9 % (FLUSH) 0.9 %
3 SYRINGE (ML) INJECTION EVERY 12 HOURS SCHEDULED
Status: DISCONTINUED | OUTPATIENT
Start: 2023-04-27 | End: 2023-04-27 | Stop reason: HOSPADM

## 2023-04-27 RX ADMIN — NEOSTIGMINE METHYLSULFATE 3 MG: 0.5 INJECTION INTRAVENOUS at 09:06

## 2023-04-27 RX ADMIN — FAMOTIDINE 20 MG: 10 INJECTION INTRAVENOUS at 06:39

## 2023-04-27 RX ADMIN — ROCURONIUM BROMIDE 40 MG: 10 INJECTION, SOLUTION INTRAVENOUS at 07:44

## 2023-04-27 RX ADMIN — Medication 100 MCG: at 08:24

## 2023-04-27 RX ADMIN — HYDROCODONE BITARTRATE AND ACETAMINOPHEN 1 TABLET: 7.5; 325 TABLET ORAL at 10:00

## 2023-04-27 RX ADMIN — DEXAMETHASONE SODIUM PHOSPHATE 8 MG: 4 INJECTION, SOLUTION INTRAMUSCULAR; INTRAVENOUS at 07:44

## 2023-04-27 RX ADMIN — SODIUM CHLORIDE, POTASSIUM CHLORIDE, SODIUM LACTATE AND CALCIUM CHLORIDE 9 ML/HR: 600; 310; 30; 20 INJECTION, SOLUTION INTRAVENOUS at 06:39

## 2023-04-27 RX ADMIN — EPHEDRINE SULFATE 10 MG: 50 INJECTION INTRAVENOUS at 08:09

## 2023-04-27 RX ADMIN — ONDANSETRON 4 MG: 2 INJECTION INTRAMUSCULAR; INTRAVENOUS at 09:06

## 2023-04-27 RX ADMIN — PROPOFOL 150 MG: 10 INJECTION, EMULSION INTRAVENOUS at 07:44

## 2023-04-27 RX ADMIN — MIDAZOLAM 0.5 MG: 1 INJECTION INTRAMUSCULAR; INTRAVENOUS at 06:39

## 2023-04-27 RX ADMIN — Medication 200 MCG: at 08:51

## 2023-04-27 RX ADMIN — MIDAZOLAM 0.5 MG: 1 INJECTION INTRAMUSCULAR; INTRAVENOUS at 07:34

## 2023-04-27 RX ADMIN — FENTANYL CITRATE 50 MCG: 50 INJECTION, SOLUTION INTRAMUSCULAR; INTRAVENOUS at 09:38

## 2023-04-27 RX ADMIN — GLYCOPYRROLATE 0.4 MCG: 1 INJECTION INTRAMUSCULAR; INTRAVENOUS at 09:06

## 2023-04-27 RX ADMIN — FENTANYL CITRATE 50 MCG: 50 INJECTION, SOLUTION INTRAMUSCULAR; INTRAVENOUS at 07:44

## 2023-04-27 RX ADMIN — Medication 100 MCG: at 07:59

## 2023-04-27 RX ADMIN — Medication 200 MCG: at 09:04

## 2023-04-27 RX ADMIN — LIDOCAINE HYDROCHLORIDE 100 MG: 20 INJECTION, SOLUTION INFILTRATION; PERINEURAL at 07:44

## 2023-04-27 RX ADMIN — Medication 100 MCG: at 08:04

## 2023-04-27 NOTE — ANESTHESIA PREPROCEDURE EVALUATION
Anesthesia Evaluation     Patient summary reviewed and Nursing notes reviewed                Airway   Mallampati: II  TM distance: >3 FB  Neck ROM: full  Dental      Pulmonary - negative pulmonary ROS   Cardiovascular     ECG reviewed  Rhythm: regular  Rate: normal    (+) hypertension, hyperlipidemia,       Neuro/Psych  (+) numbness, psychiatric history Anxiety and Depression,    GI/Hepatic/Renal/Endo    (+)   thyroid problem hypothyroidism    Musculoskeletal (-) negative ROS    Abdominal    Substance History - negative use     OB/GYN negative ob/gyn ROS         Other      history of cancer                    Anesthesia Plan    ASA 2     general     (I have reviewed the patient's history with the patient and the chart, including all pertinent laboratory results and imaging. I have explained the risks of anesthesia including but not limited to dental damage, corneal abrasion, nerve injury, MI, stroke, and death. Questions asked and answered. Anesthetic plan discussed with patient and team as indicated. Patient expressed understanding of the above.  )  intravenous induction     Anesthetic plan, risks, benefits, and alternatives have been provided, discussed and informed consent has been obtained with: patient and spouse/significant other.        CODE STATUS:

## 2023-04-27 NOTE — OP NOTE
THYROIDECTOMY  Progress Note    Mag Palomino  4/27/2023    Pre-op Diagnosis:   Right thyroid nodule       Post-Op Diagnosis Codes:  Same    Procedure/CPT® Codes:        Procedure(s):  RIGHT ZAID-THYROIDECTOMY        Surgeon(s):  Arden Whitley MD    Anesthesia: General    Staff:   Circulator: Abril Wan RN; Soumya Bose RN  Scrub Person: Sheri Car; Suzie Esquivel         Estimated Blood Loss: 10 ml    Urine Voided: * No values recorded between 4/27/2023  7:39 AM and 4/27/2023  9:07 AM *    Specimens:                Specimens     ID Source Type Tests Collected By Collected At Frozen?    A Thyroid Tissue · TISSUE PATHOLOGY EXAM   Arden Whitley MD 4/27/23 0844 Yes    Description: RIGHT THYROID LOBE AND ISTHMUS     Comment: FROZEN PLEASE CALL OR 3 @3227                Drains: * No LDAs found *    Findings: Large probably 4 cm nodule in the right thyroid lobe.  Recurrent laryngeal nerve was noted deeper in the soft tissue.  Frozen section favored benign pathology.    OPERATIVE REPORT: The patient was taken to the operating room placed in the supine position and placed under general endotracheal anesthesia.  The skin incision was planned in a natural skin crease and injected with 1% lidocaine with epinephrine.  Sterile prep and drape with Hibiclens was performed.  The incision was then made with a 15 blade scalpel.  This was carried through the subcutaneous fat and platysma.  Subplatysmal flaps were elevated.  The midline raphae was then found and the strap muscles were reflected laterally over the right thyroid lobe only.  Careful dissection on the capsule of the gland was performed.  The Harmonic scalpel was used to take down the blood supply superiorly, inferiorly and laterally.  Hemoclips were used were necessary for larger vessels.  As the thyroid was reflected anteromedially, the recurrent laryngeal nerve was noted deeper in the soft tissue and dissected free from the  capsule of the gland.The attachments at Berry's ligament were divided using the harmonic scalpel.  The isthmus was divided and the final attachments at the trachea were taken down using the Harmonic scalpel.  Irrigation of the operative site was performed and hemostasis was assured using the bipolar electrocautery.  #10 round drain was placed exiting through separate skin incision.  The midline raphae was then reapproximated with 3-0 Vicryl suture.  A 4-0 Vicryl suture was used for platysmal and dermal closure.   5-0 nylon was then used to close the epidermis.  Bacitracin was then applied.  At this point the procedure was complete.  The patient was allowed to awake from anesthesia and taken to the recovery room in satisfactory condition.      Complications: None          Arden Whitley MD     Date: 4/27/2023  Time: 09:09 EDT

## 2023-04-27 NOTE — ANESTHESIA POSTPROCEDURE EVALUATION
Patient: Mag Palomino    Procedure Summary     Date: 04/27/23 Room / Location: [facility] OR 03 / [facility] MAIN OR    Anesthesia Start: 0740 Anesthesia Stop: 0926    Procedure: RIGHT ZAID-THYROIDECTOMY (Right: Neck) Diagnosis:     Surgeons: Arden Whitley MD Provider: Joe Santa MD    Anesthesia Type: general ASA Status: 2          Anesthesia Type: general    Vitals  Vitals Value Taken Time   /76 04/27/23 1001   Temp 36.4 °C (97.6 °F) 04/27/23 0921   Pulse 68 04/27/23 1014   Resp 18 04/27/23 0930   SpO2 98 % 04/27/23 1014   Vitals shown include unvalidated device data.        Post Anesthesia Care and Evaluation    Patient location during evaluation: PACU  Patient participation: complete - patient participated  Level of consciousness: awake and alert  Pain management: adequate    Airway patency: patent  Anesthetic complications: No anesthetic complications    Cardiovascular status: acceptable  Respiratory status: acceptable  Hydration status: acceptable    Comments: --------------------            04/27/23               1000     --------------------   BP:       143/76     Pulse:      75       Resp:                Temp:                SpO2:      97%      --------------------

## 2023-04-27 NOTE — ANESTHESIA PROCEDURE NOTES
Airway  Urgency: elective    Date/Time: 4/27/2023 7:46 AM  Airway not difficult    General Information and Staff    Patient location during procedure: OR  CRNA/CAA: Sahra Almendarez CRNA    Indications and Patient Condition  Indications for airway management: airway protection    Preoxygenated: yes  Mask difficulty assessment: 1 - vent by mask    Final Airway Details  Final airway type: endotracheal airway      Successful airway: ETT  Cuffed: yes   Successful intubation technique: direct laryngoscopy  Facilitating devices/methods: intubating stylet  Endotracheal tube insertion site: oral  Blade: Skyler  Blade size: 3  ETT size (mm): 7.0  Cormack-Lehane Classification: grade I - full view of glottis  Placement verified by: chest auscultation and capnometry   Cuff volume (mL): 8  Measured from: lips  Number of attempts at approach: 1  Assessment: lips, teeth, and gum same as pre-op and atraumatic intubation

## 2023-04-28 LAB
LAB AP CASE REPORT: NORMAL
LAB AP CLINICAL INFORMATION: NORMAL
Lab: NORMAL
PATH REPORT.FINAL DX SPEC: NORMAL
PATH REPORT.GROSS SPEC: NORMAL

## 2023-05-10 ENCOUNTER — TELEPHONE (OUTPATIENT)
Dept: INTERNAL MEDICINE | Facility: CLINIC | Age: 73
End: 2023-05-10
Payer: MEDICARE

## 2023-05-10 NOTE — TELEPHONE ENCOUNTER
Caller: Mag Palomino    Relationship: Self    Best call back number: [phone]    What is the best time to reach you:  ANY     Who are you requesting to speak with (clinical staff, provider,  specific staff member): CLINICAL STAFF       What was the call regarding: PATIENT HAD THYROID SURGERY ON 04/27/2023 BY DOCTOR AYUSH,  SHE IS WANTING TO KNOW IF SHE WILL NEED TO HAVE LAB WORK DONE TO CHECK THE FUNCTION OF HER THYROID SINCE HAVING SURGERY     Do you require a callback: YES

## 2023-05-18 DIAGNOSIS — E78.2 MIXED HYPERLIPIDEMIA: ICD-10-CM

## 2023-05-18 RX ORDER — ATORVASTATIN CALCIUM 20 MG/1
TABLET, FILM COATED ORAL
Qty: 90 TABLET | Refills: 1 | Status: SHIPPED | OUTPATIENT
Start: 2023-05-18

## 2023-05-30 DIAGNOSIS — E89.0 S/P PARTIAL THYROIDECTOMY: Primary | ICD-10-CM

## 2023-05-30 DIAGNOSIS — E89.0 S/P PARTIAL THYROIDECTOMY: ICD-10-CM

## 2023-06-01 DIAGNOSIS — E78.2 MIXED HYPERLIPIDEMIA: Primary | ICD-10-CM

## 2023-06-01 DIAGNOSIS — E89.0 S/P PARTIAL THYROIDECTOMY: ICD-10-CM

## 2023-06-01 LAB
T3FREE SERPL-MCNC: 3.1 PG/ML (ref 2–4.4)
T4 FREE SERPL-MCNC: 1.11 NG/DL (ref 0.93–1.7)
TSH SERPL DL<=0.005 MIU/L-ACNC: 7.9 UIU/ML (ref 0.27–4.2)

## 2023-08-18 ENCOUNTER — HOSPITAL ENCOUNTER (OUTPATIENT)
Dept: MAMMOGRAPHY | Facility: HOSPITAL | Age: 73
Discharge: HOME | End: 2023-08-18
Admitting: INTERNAL MEDICINE
Payer: MEDICARE

## 2023-08-18 DIAGNOSIS — C85.92 NON-HODGKIN LYMPHOMA OF INTRATHORACIC LYMPH NODES, UNSPECIFIED NON-HODGKIN LYMPHOMA TYPE: ICD-10-CM

## 2023-08-18 DIAGNOSIS — Z85.3 HISTORY OF BREAST CANCER: ICD-10-CM

## 2023-08-18 DIAGNOSIS — Z12.31 ENCOUNTER FOR SCREENING MAMMOGRAM FOR MALIGNANT NEOPLASM OF BREAST: ICD-10-CM

## 2023-08-18 DIAGNOSIS — D05.02 NEOPLASM OF LEFT BREAST, PRIMARY TUMOR STAGING CATEGORY TIS: LOBULAR CARCINOMA IN SITU (LCIS): ICD-10-CM

## 2023-08-18 PROCEDURE — 77063 BREAST TOMOSYNTHESIS BI: CPT

## 2023-08-18 PROCEDURE — 77067 SCR MAMMO BI INCL CAD: CPT

## 2023-08-29 DIAGNOSIS — E89.0 S/P PARTIAL THYROIDECTOMY: Primary | ICD-10-CM

## 2023-10-23 ENCOUNTER — TELEPHONE (OUTPATIENT)
Dept: INTERNAL MEDICINE | Facility: CLINIC | Age: 73
End: 2023-10-23

## 2023-10-23 ENCOUNTER — OFFICE VISIT (OUTPATIENT)
Dept: INTERNAL MEDICINE | Facility: CLINIC | Age: 73
End: 2023-10-23
Payer: MEDICARE

## 2023-10-23 VITALS
HEART RATE: 69 BPM | HEIGHT: 63 IN | SYSTOLIC BLOOD PRESSURE: 138 MMHG | OXYGEN SATURATION: 99 % | BODY MASS INDEX: 21.44 KG/M2 | WEIGHT: 121 LBS | DIASTOLIC BLOOD PRESSURE: 80 MMHG

## 2023-10-23 DIAGNOSIS — L70.8 OTHER ACNE: ICD-10-CM

## 2023-10-23 DIAGNOSIS — Z85.3 HISTORY OF BREAST CANCER: ICD-10-CM

## 2023-10-23 DIAGNOSIS — Z00.00 MEDICARE ANNUAL WELLNESS VISIT, SUBSEQUENT: Primary | ICD-10-CM

## 2023-10-23 DIAGNOSIS — R94.31 ABNORMAL EKG: ICD-10-CM

## 2023-10-23 DIAGNOSIS — E78.5 HYPERLIPIDEMIA, UNSPECIFIED HYPERLIPIDEMIA TYPE: Chronic | ICD-10-CM

## 2023-10-23 DIAGNOSIS — R12 HEARTBURN: ICD-10-CM

## 2023-10-23 DIAGNOSIS — E89.0 HISTORY OF PARTIAL THYROIDECTOMY: ICD-10-CM

## 2023-10-23 DIAGNOSIS — Z23 NEED FOR INFLUENZA VACCINATION: ICD-10-CM

## 2023-10-23 RX ORDER — CHLORAL HYDRATE 500 MG
CAPSULE ORAL
COMMUNITY

## 2023-10-23 NOTE — TELEPHONE ENCOUNTER
Caller: Mag Palomino    Relationship: Self    Best call back number: [phone]    What orders are you requesting (i.e. lab or imaging): BLOOD WORK    In what timeframe would the patient need to come in: AS SOON AS AVAILABLE    Where will you receive your lab/imaging services: IN OFFICE    Additional notes: PATIENT STATED THAT WHEN THEY WENT TO SCHEDULE THE LABS THAT ARE ORDERED SHE WAS TOLD THAT SHE ONLY HAS ORDERS FOR HER THYROID. STATED THAT SHE WANTS TO MAKE SURE THAT SHE HAS LABS DONE FOR TRIGLYCERIDES AND CHOLESTEROL AS WELL. PLEASE CALL AND ADVISE

## 2023-10-23 NOTE — ASSESSMENT & PLAN NOTE
EKG in office showed no previous changes  Will refer to cardiology for further discussion/evaluation  Normal stress echo in 2014-- reviewed records

## 2023-10-23 NOTE — PROGRESS NOTES
The ABCs of the Annual Wellness Visit  Subsequent Medicare Wellness Visit    Subjective    Mag Palomino is a 73 y.o. female who presents for a Subsequent Medicare Wellness Visit.    The following portions of the patient's history were reviewed and   updated as appropriate: allergies, current medications, past family history, past medical history, past social history, past surgical history, and problem list.    Compared to one year ago, the patient feels her physical   health is the same.    Compared to one year ago, the patient feels her mental   health is the same.    Recent Hospitalizations:  She was admitted within the past 365 days at [facility].       Current Medical Providers:  LIZBETH Navarro-- PCP  Dr. James-- oncology  Dr. Avendano-- optho    Outpatient Medications Prior to Visit   Medication Sig Dispense Refill    acetaminophen (TYLENOL) 325 MG tablet Take 2 tablets by mouth Every 6 (Six) Hours As Needed for Mild Pain.      atorvastatin (LIPITOR) 20 MG tablet TAKE ONE TABLET BY MOUTH DAILY 90 tablet 1    cetirizine (ZyrTEC) 10 MG tablet Take 1 tablet by mouth Every Night.      docusate sodium (COLACE) 100 MG capsule Take 1 capsule by mouth Daily.      lansoprazole (PREVACID) 15 MG capsule Take 1 capsule by mouth As Needed.      Omega-3 Fatty Acids (fish oil) 1000 MG capsule capsule       spironolactone (ALDACTONE) 100 MG tablet Take 1 tablet by mouth Daily. ACNE       No facility-administered medications prior to visit.       No opioid medication identified on active medication list. I have reviewed chart for other potential  high risk medication/s and harmful drug interactions in the elderly.        Aspirin is not on active medication list.  Aspirin use is not indicated based on review of current medical condition/s. Risk of harm outweighs potential benefits.  .    Patient Active Problem List   Diagnosis    Allergic rhinitis    Hyperlipidemia    History of colonic polyps    Palpitation    Neoplasm  "of left breast, primary tumor staging category Tis: lobular carcinoma in situ (LCIS)    Constipation    Heartburn    Elevated liver function tests    Other acne    Medicare annual wellness visit, subsequent    Thyroid mass    Small lymphocytic lymphoma    History of breast cancer    Abnormal EKG    History of partial thyroidectomy     Advance Care Planning   Advance Care Planning     Advance Directive is on file.  ACP discussion was held with the patient during this visit. Patient has an advance directive in EMR which is still valid.      Objective    Vitals:    10/23/23 0850   BP: 138/80   BP Location: Left arm   Patient Position: Sitting   Cuff Size: Adult   Pulse: 69   SpO2: 99%   Weight: 54.9 kg (121 lb)   Height: 160 cm (62.99\")     Estimated body mass index is 21.44 kg/m² as calculated from the following:    Height as of this encounter: 160 cm (62.99\").    Weight as of this encounter: 54.9 kg (121 lb).    BMI is within normal parameters. No other follow-up for BMI required.      Does the patient have evidence of cognitive impairment? No          HEALTH RISK ASSESSMENT    Smoking Status:  Social History     Tobacco Use   Smoking Status Never   Smokeless Tobacco Never     Alcohol Consumption:  Social History     Substance and Sexual Activity   Alcohol Use Yes    Alcohol/week: 2.0 - 3.0 standard drinks of alcohol    Types: 1 - 2 Glasses of wine, 1 Cans of beer per week    Comment: social     Fall Risk Screen:    STEADI Fall Risk Assessment was completed, and patient is at LOW risk for falls.Assessment completed on:10/23/2023    Depression Screening:      10/23/2023     8:55 AM   PHQ-2/PHQ-9 Depression Screening   Little Interest or Pleasure in Doing Things 0-->not at all   Feeling Down, Depressed or Hopeless 0-->not at all   PHQ-9: Brief Depression Severity Measure Score 0       Health Habits and Functional and Cognitive Screening:      10/23/2023     8:56 AM   Functional & Cognitive Status   Do you have " difficulty preparing food and eating? No   Do you have difficulty bathing yourself, getting dressed or grooming yourself? No   Do you have difficulty using the toilet? No   Do you have difficulty moving around from place to place? No   Do you have trouble with steps or getting out of a bed or a chair? No   Dental Exam Up to date   Eye Exam Up to date   Exercise (times per week) 5 times per week   Current Exercises Include Walking   Do you need help using the phone?  No   Are you deaf or do you have serious difficulty hearing?  No   Do you need help shopping? No   Do you need help preparing meals?  No   Do you need help with housework?  No   Do you need help with laundry? No   Do you need help taking your medications? No   Do you need help managing money? No   Do you ever drive or ride in a car without wearing a seat belt? No   Have you felt unusual stress, anger or loneliness in the last month? No   Who do you live with? Spouse   If you need help, do you have trouble finding someone available to you? No   Do you have difficulty concentrating, remembering or making decisions? No       Age-appropriate Screening Schedule:  Refer to the list below for future screening recommendations based on patient's age, sex and/or medical conditions. Orders for these recommended tests are listed in the plan section. The patient has been provided with a written plan.    Health Maintenance   Topic Date Due    TDAP/TD VACCINES (2 - Td or Tdap) 10/23/2023 (Originally 8/1/2023)    COVID-19 Vaccine (7 - 2023-24 season) 12/05/2023    LIPID PANEL  04/20/2024    MAMMOGRAM  08/18/2024    ANNUAL WELLNESS VISIT  10/23/2024    DXA SCAN  11/07/2024    COLORECTAL CANCER SCREENING  12/14/2025    HEPATITIS C SCREENING  Completed    INFLUENZA VACCINE  Completed    Pneumococcal Vaccine 65+  Completed    ZOSTER VACCINE  Completed    PAP SMEAR  Discontinued                  CMS Preventative Services Quick Reference  Risk Factors Identified During  "Encounter  Immunizations Discussed/Encouraged: Tdap  Dental Screening Recommended  Vision Screening Recommended  The above risks/problems have been discussed with the patient.  Pertinent information has been shared with the patient in the After Visit Summary.  An After Visit Summary and PPPS were made available to the patient.    Follow Up:   Next Medicare Wellness visit to be scheduled in 1 year.       Additional E&M Note during same encounter follows:  Patient has multiple medical problems which are significant and separately identifiable that require additional work above and beyond the Medicare Wellness Visit.      Chief Complaint  Medicare Wellness-subsequent    Subjective        HPI  Mag Palomino is also being seen today for abnormal EKG in April 2023 for pre-op clearance for partial thyroidectomy under Dr. Whitley.   Had normal stress echo back in 2014; normal results.   Patient was noted on EKG on 4/17/23 to have probable left atrial enlargement/probable anteroseptal infarct.   Denies any current CP; denies any activity level change. Reports chronic some mild shortness of breath with hills in the neighborhood when she walks.       Objective   Vital Signs:  /80 (BP Location: Left arm, Patient Position: Sitting, Cuff Size: Adult)   Pulse 69   Ht 160 cm (62.99\")   Wt 54.9 kg (121 lb)   SpO2 99%   BMI 21.44 kg/m²     Physical Exam  Constitutional:       Appearance: Normal appearance.   HENT:      Head: Normocephalic and atraumatic.      Right Ear: External ear normal.      Left Ear: External ear normal.      Nose: Nose normal.      Mouth/Throat:      Mouth: Mucous membranes are moist.      Pharynx: Oropharynx is clear.   Eyes:      Conjunctiva/sclera: Conjunctivae normal.      Pupils: Pupils are equal, round, and reactive to light.   Neck:      Vascular: No carotid bruit.   Cardiovascular:      Rate and Rhythm: Normal rate and regular rhythm.      Pulses: Normal pulses.      Heart sounds: Normal " heart sounds. No murmur heard.     No friction rub. No gallop.   Pulmonary:      Effort: Pulmonary effort is normal. No respiratory distress.      Breath sounds: Normal breath sounds. No stridor. No wheezing, rhonchi or rales.   Musculoskeletal:      Cervical back: Normal range of motion and neck supple.   Skin:     General: Skin is warm and dry.   Neurological:      General: No focal deficit present.      Mental Status: She is alert and oriented to person, place, and time. Mental status is at baseline.   Psychiatric:         Mood and Affect: Mood normal.         Thought Content: Thought content normal.         Judgment: Judgment normal.          The following data was reviewed by: ILZBETH Navarro on 10/23/2023:  Common labs          4/17/2023    11:03 4/20/2023    08:51 7/6/2023    09:54   Common Labs   Glucose 105   91    BUN 18   18    Creatinine 0.76   0.89     0.90    Sodium 139   141    Potassium 4.1   4.5    Chloride 104   103    Calcium 9.4   10.1    Total Protein   7.0    Albumin   5.2     4.3    Total Bilirubin   0.6    Alkaline Phosphatase   58    AST (SGOT)   27    ALT (SGPT)   18    WBC 5.54   5.67    Hemoglobin 13.9   14.7    Hematocrit 41.5   43.3    Platelets 210   194    Total Cholesterol  159     Triglycerides  80     HDL Cholesterol  76     LDL Cholesterol   68       ECG 12 Lead (04/17/2023 11:21 AM)    Adult ECG Report     Name: Mag Palomino   Age: 73 y.o.   Gender: female       Rate: 65   Rhythm: normal sinus rhythm   QRS Axis: -45   GA Interval: 154   QRS Duration: 114   QTc: 443   Voltages: -   Conduction Disturbances: none   Other Abnormalities: none     Narrative Interpretation: Sinus rhythm  No significant change when compared to April 2023.              Assessment and Plan   Diagnoses and all orders for this visit:    1. Medicare annual wellness visit, subsequent (Primary)  -     CBC & Differential  -     Comprehensive metabolic panel    2. Hyperlipidemia, unspecified  hyperlipidemia type  Assessment & Plan:  Lipid abnormalities are improving with treatment.  Pharmacotherapy as ordered.  Lipids will be reassessed in 6 months.    Orders:  -     TSH Rfx On Abnormal To Free T4  -     Lipid panel    3. History of breast cancer  Assessment & Plan:  Follows with Dr. James  Mammogram UTD      4. Other acne  Assessment & Plan:  Continues on aldactone   Following with dermatology       5. Heartburn  Assessment & Plan:  Continues on prevacid.        6. History of partial thyroidectomy  -     T3, free    7. Need for influenza vaccination  -     Fluzone High-Dose 65+yrs    8. Abnormal EKG  Assessment & Plan:  EKG in office showed no previous changes  Will refer to cardiology for further discussion/evaluation  Normal stress echo in 2014-- reviewed records      Orders:  -     ECG 12 Lead  -     Ambulatory Referral to Cardiology             Follow Up   Return in about 6 months (around 4/23/2024) for Recheck thyroid .  Patient was given instructions and counseling regarding her condition or for health maintenance advice. Please see specific information pulled into the AVS if appropriate.

## 2023-10-24 DIAGNOSIS — E89.0 POSTOPERATIVE HYPOTHYROIDISM: Primary | ICD-10-CM

## 2023-10-24 RX ORDER — LEVOTHYROXINE SODIUM 0.03 MG/1
25 TABLET ORAL
Qty: 90 TABLET | Refills: 0 | Status: SHIPPED | OUTPATIENT
Start: 2023-10-24

## 2023-10-25 ENCOUNTER — TELEPHONE (OUTPATIENT)
Dept: INTERNAL MEDICINE | Facility: CLINIC | Age: 73
End: 2023-10-25
Payer: MEDICARE

## 2023-10-25 NOTE — TELEPHONE ENCOUNTER
----- Message from LIZBETH Navarro sent at 10/24/2023 12:04 PM EDT -----  Please let patient know--   TSH is elevated to 8.94; I'd like to start her on low dose thyroid medication to reduce this; will start synthroid 25mcg daily  She should take on empty stomach first thing in AM; wait 30 min before taking other medications/eating/drinking    F/u in lab in 8 weeks for recheck

## 2023-10-27 LAB
ALBUMIN SERPL-MCNC: 5 G/DL (ref 3.5–5.2)
ALBUMIN/GLOB SERPL: 2.8 G/DL
ALP SERPL-CCNC: 50 U/L (ref 39–117)
ALT SERPL-CCNC: 22 U/L (ref 1–33)
AST SERPL-CCNC: 27 U/L (ref 1–32)
BASOPHILS # BLD AUTO: 0.04 10*3/MM3 (ref 0–0.2)
BASOPHILS NFR BLD AUTO: 0.9 % (ref 0–1.5)
BILIRUB SERPL-MCNC: 0.6 MG/DL (ref 0–1.2)
BUN SERPL-MCNC: 13 MG/DL (ref 8–23)
BUN/CREAT SERPL: 16.3 (ref 7–25)
CALCIUM SERPL-MCNC: 10.1 MG/DL (ref 8.6–10.5)
CHLORIDE SERPL-SCNC: 104 MMOL/L (ref 98–107)
CHOLEST SERPL-MCNC: 151 MG/DL (ref 0–200)
CO2 SERPL-SCNC: 26.9 MMOL/L (ref 22–29)
CREAT SERPL-MCNC: 0.8 MG/DL (ref 0.57–1)
EGFRCR SERPLBLD CKD-EPI 2021: 78.4 ML/MIN/1.73
EOSINOPHIL # BLD AUTO: 0.07 10*3/MM3 (ref 0–0.4)
EOSINOPHIL NFR BLD AUTO: 1.5 % (ref 0.3–6.2)
ERYTHROCYTE [DISTWIDTH] IN BLOOD BY AUTOMATED COUNT: 12.3 % (ref 12.3–15.4)
GLOBULIN SER CALC-MCNC: 1.8 GM/DL
GLUCOSE SERPL-MCNC: 89 MG/DL (ref 65–99)
HCT VFR BLD AUTO: 40.3 % (ref 34–46.6)
HDLC SERPL-MCNC: 65 MG/DL (ref 40–60)
HGB BLD-MCNC: 13.7 G/DL (ref 12–15.9)
IMM GRANULOCYTES # BLD AUTO: 0.01 10*3/MM3 (ref 0–0.05)
IMM GRANULOCYTES NFR BLD AUTO: 0.2 % (ref 0–0.5)
LDLC SERPL CALC-MCNC: 72 MG/DL (ref 0–100)
LYMPHOCYTES # BLD AUTO: 1.46 10*3/MM3 (ref 0.7–3.1)
LYMPHOCYTES NFR BLD AUTO: 32.1 % (ref 19.6–45.3)
MCH RBC QN AUTO: 30.6 PG (ref 26.6–33)
MCHC RBC AUTO-ENTMCNC: 34 G/DL (ref 31.5–35.7)
MCV RBC AUTO: 90 FL (ref 79–97)
MONOCYTES # BLD AUTO: 0.4 10*3/MM3 (ref 0.1–0.9)
MONOCYTES NFR BLD AUTO: 8.8 % (ref 5–12)
NEUTROPHILS # BLD AUTO: 2.57 10*3/MM3 (ref 1.7–7)
NEUTROPHILS NFR BLD AUTO: 56.5 % (ref 42.7–76)
NRBC BLD AUTO-RTO: 0 /100 WBC (ref 0–0.2)
PLATELET # BLD AUTO: 205 10*3/MM3 (ref 140–450)
POTASSIUM SERPL-SCNC: 4.8 MMOL/L (ref 3.5–5.2)
PROT SERPL-MCNC: 6.8 G/DL (ref 6–8.5)
RBC # BLD AUTO: 4.48 10*6/MM3 (ref 3.77–5.28)
SODIUM SERPL-SCNC: 141 MMOL/L (ref 136–145)
T3FREE SERPL-MCNC: 2.9 PG/ML (ref 2–4.4)
T4 FREE SERPL-MCNC: 1.23 NG/DL (ref 0.93–1.7)
TRIGL SERPL-MCNC: 70 MG/DL (ref 0–150)
TSH SERPL DL<=0.005 MIU/L-ACNC: 9.39 UIU/ML (ref 0.27–4.2)
VLDLC SERPL CALC-MCNC: 14 MG/DL (ref 5–40)
WBC # BLD AUTO: 4.55 10*3/MM3 (ref 3.4–10.8)

## 2023-11-03 ENCOUNTER — OFFICE VISIT (OUTPATIENT)
Age: 73
End: 2023-11-03
Payer: MEDICARE

## 2023-11-03 VITALS
SYSTOLIC BLOOD PRESSURE: 140 MMHG | DIASTOLIC BLOOD PRESSURE: 82 MMHG | HEIGHT: 63 IN | BODY MASS INDEX: 21.26 KG/M2 | WEIGHT: 120 LBS

## 2023-11-03 DIAGNOSIS — R94.31 ABNORMAL EKG: Primary | ICD-10-CM

## 2023-11-03 PROCEDURE — 99204 OFFICE O/P NEW MOD 45 MIN: CPT | Performed by: INTERNAL MEDICINE

## 2023-11-03 NOTE — PROGRESS NOTES
"    Subjective:     Encounter Date:11/03/2023      Patient ID: Mag Palomino is a 73 y.o. female.    Chief Complaint: abnl EKG  HPI\"   This is a pleasant 73-year-old woman who presents for evaluation.  Earlier this year she had a partial thyroidectomy.  Preoperative EKG showed anterior infarct with left atrial enlargement.  She was referred for follow-up of this.  She underwent surgery without any difficulty.  Upon review of her prior EKGs she had a stress echocardiogram performed in 2014 which also showed anterior infarct pattern.  The stress echo was normal.  She is quite active.  She walks 2 to 3 miles every day of the week without angina or dyspnea.  Energy level is good.  She is occasionally dizzy when standing for a long time in the heat.  She has had a few presyncopal/syncopal episodes which she recovers well from and can abort by sitting down.  She has a history of breast cancer and lymphoma.  She sees Dr. James.  Her lipids are excellent on Lipitor 20.  She does not have hypertension.    The following portions of the patient's history were reviewed and updated as appropriate: allergies, current medications, past family history, past medical history, past social history, past surgical history and problem list.     REVIEW OF SYSTEMS:   All systems reviewed.  Pertinent positives identified in HPI.  All other systems are negative.    Past Medical History:   Diagnosis Date    Abnormal EKG     Acne     Derm Dr. Hui    Allergic     seasonal    Anxiety     Breast cancer 01/2000    Right lobular (oncology CBC) Dr. James    Constipation     Drug therapy     Fibrocystic breast     H/O bone density study     H/O bone density study 10/06/2016    H/O echocardiogram 11/2014    Dr De La Torre    H/O mammogram     Headache     History of colonic polyps     Hyperlipidemia     Hypertension     Nocturia     Palpitation     HX    Paresthesia of arm     Personal history of malignant neoplasm of breast     Piriformis syndrome     " Small lymphocytic lymphoma     Thyroid nodule        Family History   Problem Relation Age of Onset    Hyperlipidemia Mother     Heart attack Father     Alcohol abuse Father     Heart disease Father     Cancer Sister     Thyroid disease Sister     Heart disease Sister     Anxiety disorder Sister     Depression Sister     Thyroid disease Sister     Cancer Brother     Heart disease Brother     Ulcers Other     Polycythemia Other     Hyperlipidemia Other     Malig Hyperthermia Neg Hx        Social History     Socioeconomic History    Marital status:    Tobacco Use    Smoking status: Never    Smokeless tobacco: Never   Vaping Use    Vaping Use: Never used   Substance and Sexual Activity    Alcohol use: Yes     Alcohol/week: 2.0 - 3.0 standard drinks of alcohol     Types: 1 - 2 Glasses of wine, 1 Cans of beer per week     Comment: social    Drug use: No    Sexual activity: Yes     Partners: Male     Birth control/protection: Post-menopausal       No Known Allergies    Past Surgical History:   Procedure Laterality Date    BREAST BIOPSY      BREAST LUMPECTOMY Right 2000     SECTION  1979    COLONOSCOPY N/A 2015    Dr. Mag York    ENDOMETRIAL BIOPSY  2005    Normal    PAP SMEAR  2013    THYROIDECTOMY Right 2023    Procedure: RIGHT ZAID-THYROIDECTOMY;  Surgeon: Arden Whitley MD;  Location: [facility];  Service: ENT;  Laterality: Right;    US GUIDED LYMPH NODE BIOPSY  2022       Procedures       Objective:         PHYSICAL EXAM:  GEN: VSS, no distress,   Eyes: normal sclera, normal lids and lashes  HENT: moist mucus membranes,   Respiratory: CTAB, no rales or wheezes  CV: RRR, 2.6 systolic nonradiating LUSB murmurs, , +2 DP and 2+ carotid pulses b/l  GI: NABS, soft,  Nontender, nondistended  MSK: no edema, no scoliosis or kyphosis  Skin: no rash, warm, dry  Heme/Lymph: no bruising or bleeding  Psych: organized thought, normal behavior and affect  Neuro: Cranial  nerves grossly intact, Alert and Oriented x 3.         Assessment:          Diagnosis Plan   1. Abnormal EKG               Plan:         1.  Abnormal EKG: Anterior infarct pattern is stable from 2014 at which time she had a normal stress echo.  No further testing is required.  2.  Faint 2 out of 6 systolic murmur at the right upper sternal border, nonradiating.  Likely represents aortic sclerosis.  Continue to monitor clinically.    Renetta, thank you very much for referring this kind patient to me. Please call me with any questions or concerns. I will see the patient again in the office in 1 year.         Alison Valencia MD  11/03/23  [facility]    Outpatient Encounter Medications as of 11/3/2023   Medication Sig Dispense Refill    acetaminophen (TYLENOL) 325 MG tablet Take 2 tablets by mouth Every 6 (Six) Hours As Needed for Mild Pain.      atorvastatin (LIPITOR) 20 MG tablet TAKE ONE TABLET BY MOUTH DAILY 90 tablet 1    cetirizine (ZyrTEC) 10 MG tablet Take 1 tablet by mouth Every Night.      docusate sodium (COLACE) 100 MG capsule Take 1 capsule by mouth Daily.      lansoprazole (PREVACID) 15 MG capsule Take 1 capsule by mouth As Needed.      levothyroxine (Synthroid) 25 MCG tablet Take 1 tablet by mouth Every Morning. 90 tablet 0    Omega-3 Fatty Acids (fish oil) 1000 MG capsule capsule       spironolactone (ALDACTONE) 100 MG tablet Take 1 tablet by mouth Daily. ACNE       No facility-administered encounter medications on file as of 11/3/2023.

## 2023-11-14 DIAGNOSIS — E78.2 MIXED HYPERLIPIDEMIA: ICD-10-CM

## 2023-11-14 RX ORDER — ATORVASTATIN CALCIUM 20 MG/1
20 TABLET, FILM COATED ORAL DAILY
Qty: 90 TABLET | Refills: 1 | Status: SHIPPED | OUTPATIENT
Start: 2023-11-14

## 2023-12-15 DIAGNOSIS — E89.0 POSTOPERATIVE HYPOTHYROIDISM: ICD-10-CM

## 2023-12-20 LAB
T4 FREE SERPL-MCNC: 1.46 NG/DL (ref 0.93–1.7)
TSH SERPL DL<=0.005 MIU/L-ACNC: 4.9 UIU/ML (ref 0.27–4.2)

## 2023-12-21 ENCOUNTER — TELEPHONE (OUTPATIENT)
Dept: INTERNAL MEDICINE | Facility: CLINIC | Age: 73
End: 2023-12-21
Payer: MEDICARE

## 2023-12-21 DIAGNOSIS — E89.0 POSTOPERATIVE HYPOTHYROIDISM: Primary | ICD-10-CM

## 2023-12-21 NOTE — TELEPHONE ENCOUNTER
----- Message from LIZBETH Navarro sent at 12/21/2023  9:44 AM EST -----  Please let patient know--  TSH has improved but still slightly elevated. I'd like to go ahead and increase her synthroid to 50mcg. Return to lab in 6 weeks for recheck. Please let me know which pharmacy to send new dose of synthroid

## 2023-12-21 NOTE — TELEPHONE ENCOUNTER
Patient will callback to schedule lab appt. Patient states she will double her 25mcg for now and will let us know when she runs out do you can send the 50mcg

## 2024-01-03 ENCOUNTER — TELEPHONE (OUTPATIENT)
Dept: INTERNAL MEDICINE | Facility: CLINIC | Age: 74
End: 2024-01-03
Payer: MEDICARE

## 2024-01-03 RX ORDER — LEVOTHYROXINE SODIUM 0.05 MG/1
50 TABLET ORAL DAILY
Qty: 90 TABLET | Refills: 3 | Status: SHIPPED | OUTPATIENT
Start: 2024-01-03

## 2024-01-03 NOTE — TELEPHONE ENCOUNTER
Caller: Mag Palomino    Relationship: Self    Best call back number: [phone]    What medication are you requesting: LEVOTHYROXINE 50 MG    If a prescription is needed, what is your preferred pharmacy and phone number: [facility] [id number] - [facility] [address] AT [facility] [address] - [phone] PH - [phone] FX     Additional notes:PATIENT STATED HER LAST TSH LEVELS WERE ELEVATED AND THEY WANTED HER TO START 50 MG AFTER HE FINISHED HER CURRENT 25 MG;S.  PATIENT IS REQUESTING IF THE 50 MG BE CALLED IN NOW.

## 2024-01-11 ENCOUNTER — HOSPITAL ENCOUNTER (OUTPATIENT)
Facility: HOSPITAL | Age: 74
Discharge: HOME | End: 2024-01-11
Payer: MEDICARE

## 2024-01-11 ENCOUNTER — LAB (OUTPATIENT)
Facility: HOSPITAL | Age: 74
End: 2024-01-11
Payer: MEDICARE

## 2024-01-11 DIAGNOSIS — C85.92 NON-HODGKIN LYMPHOMA OF INTRATHORACIC LYMPH NODES, UNSPECIFIED NON-HODGKIN LYMPHOMA TYPE: ICD-10-CM

## 2024-01-11 DIAGNOSIS — E07.9 THYROID MASS: ICD-10-CM

## 2024-01-11 DIAGNOSIS — C83.00 SMALL LYMPHOCYTIC LYMPHOMA: ICD-10-CM

## 2024-01-11 DIAGNOSIS — D05.02 NEOPLASM OF LEFT BREAST, PRIMARY TUMOR STAGING CATEGORY TIS: LOBULAR CARCINOMA IN SITU (LCIS): ICD-10-CM

## 2024-01-11 LAB
ALBUMIN SERPL-MCNC: 4.6 G/DL (ref 3.5–5.2)
ALBUMIN/GLOB SERPL: 1.8 G/DL
ALP SERPL-CCNC: 52 U/L (ref 39–117)
ALT SERPL W P-5'-P-CCNC: 18 U/L (ref 1–33)
ANION GAP SERPL CALCULATED.3IONS-SCNC: 12.8 MMOL/L (ref 5–15)
AST SERPL-CCNC: 24 U/L (ref 1–32)
BASOPHILS # BLD AUTO: 0.03 10*3/MM3 (ref 0–0.2)
BASOPHILS NFR BLD AUTO: 0.6 % (ref 0–1.5)
BILIRUB SERPL-MCNC: 0.5 MG/DL (ref 0–1.2)
BUN SERPL-MCNC: 16 MG/DL (ref 8–23)
BUN/CREAT SERPL: 21.6 (ref 7–25)
CALCIUM SPEC-SCNC: 9.6 MG/DL (ref 8.6–10.5)
CHLORIDE SERPL-SCNC: 103 MMOL/L (ref 98–107)
CO2 SERPL-SCNC: 25.2 MMOL/L (ref 22–29)
CREAT SERPL-MCNC: 0.74 MG/DL (ref 0.57–1)
DEPRECATED RDW RBC AUTO: 40.4 FL (ref 37–54)
EGFRCR SERPLBLD CKD-EPI 2021: 86.1 ML/MIN/1.73
EOSINOPHIL # BLD AUTO: 0.04 10*3/MM3 (ref 0–0.4)
EOSINOPHIL NFR BLD AUTO: 0.8 % (ref 0.3–6.2)
ERYTHROCYTE [DISTWIDTH] IN BLOOD BY AUTOMATED COUNT: 12.5 % (ref 12.3–15.4)
GLOBULIN UR ELPH-MCNC: 2.5 GM/DL
GLUCOSE SERPL-MCNC: 88 MG/DL (ref 65–99)
HCT VFR BLD AUTO: 42.9 % (ref 34–46.6)
HGB BLD-MCNC: 13.8 G/DL (ref 12–15.9)
IGA1 MFR SER: <50 MG/DL (ref 70–400)
IGG1 SER-MCNC: 701 MG/DL (ref 700–1600)
IGM SERPL-MCNC: 65 MG/DL (ref 40–230)
IMM GRANULOCYTES # BLD AUTO: 0.03 10*3/MM3 (ref 0–0.05)
IMM GRANULOCYTES NFR BLD AUTO: 0.6 % (ref 0–0.5)
LDH SERPL-CCNC: 176 U/L (ref 135–214)
LYMPHOCYTES # BLD AUTO: 1.43 10*3/MM3 (ref 0.7–3.1)
LYMPHOCYTES NFR BLD AUTO: 29.4 % (ref 19.6–45.3)
MCH RBC QN AUTO: 28.8 PG (ref 26.6–33)
MCHC RBC AUTO-ENTMCNC: 32.2 G/DL (ref 31.5–35.7)
MCV RBC AUTO: 89.4 FL (ref 79–97)
MONOCYTES # BLD AUTO: 0.44 10*3/MM3 (ref 0.1–0.9)
MONOCYTES NFR BLD AUTO: 9.1 % (ref 5–12)
NEUTROPHILS NFR BLD AUTO: 2.89 10*3/MM3 (ref 1.7–7)
NEUTROPHILS NFR BLD AUTO: 59.5 % (ref 42.7–76)
NRBC BLD AUTO-RTO: 0 /100 WBC (ref 0–0.2)
PLATELET # BLD AUTO: 246 10*3/MM3 (ref 140–450)
PMV BLD AUTO: 9.6 FL (ref 6–12)
POTASSIUM SERPL-SCNC: 4.1 MMOL/L (ref 3.5–5.2)
PROT SERPL-MCNC: 7.1 G/DL (ref 6–8.5)
RBC # BLD AUTO: 4.8 10*6/MM3 (ref 3.77–5.28)
SODIUM SERPL-SCNC: 141 MMOL/L (ref 136–145)
WBC NRBC COR # BLD AUTO: 4.86 10*3/MM3 (ref 3.4–10.8)

## 2024-01-11 PROCEDURE — 74177 CT ABD & PELVIS W/CONTRAST: CPT

## 2024-01-11 PROCEDURE — 25510000001 IOPAMIDOL 61 % SOLUTION: Performed by: INTERNAL MEDICINE

## 2024-01-11 PROCEDURE — 85025 COMPLETE CBC W/AUTO DIFF WBC: CPT | Performed by: INTERNAL MEDICINE

## 2024-01-11 PROCEDURE — 80053 COMPREHEN METABOLIC PANEL: CPT | Performed by: INTERNAL MEDICINE

## 2024-01-11 PROCEDURE — 71260 CT THORAX DX C+: CPT

## 2024-01-11 PROCEDURE — 82784 ASSAY IGA/IGD/IGG/IGM EACH: CPT | Performed by: INTERNAL MEDICINE

## 2024-01-11 PROCEDURE — 0 DIATRIZOATE MEGLUMINE & SODIUM PER 1 ML: Performed by: INTERNAL MEDICINE

## 2024-01-11 PROCEDURE — 83615 LACTATE (LD) (LDH) ENZYME: CPT | Performed by: INTERNAL MEDICINE

## 2024-01-11 PROCEDURE — 70491 CT SOFT TISSUE NECK W/DYE: CPT

## 2024-01-11 RX ADMIN — IOPAMIDOL 85 ML: 612 INJECTION, SOLUTION INTRAVENOUS at 09:27

## 2024-01-11 RX ADMIN — DIATRIZOATE MEGLUMINE AND DIATRIZOATE SODIUM 30 ML: 600; 100 SOLUTION ORAL; RECTAL at 09:27

## 2024-01-18 ENCOUNTER — OFFICE VISIT (OUTPATIENT)
Dept: ONCOLOGY | Facility: CLINIC | Age: 74
End: 2024-01-18
Payer: MEDICARE

## 2024-01-18 VITALS
SYSTOLIC BLOOD PRESSURE: 154 MMHG | BODY MASS INDEX: 21.79 KG/M2 | HEART RATE: 70 BPM | RESPIRATION RATE: 18 BRPM | WEIGHT: 123 LBS | TEMPERATURE: 98 F | OXYGEN SATURATION: 100 % | DIASTOLIC BLOOD PRESSURE: 73 MMHG | HEIGHT: 63 IN

## 2024-01-18 DIAGNOSIS — Z85.3 HISTORY OF BREAST CANCER: ICD-10-CM

## 2024-01-18 DIAGNOSIS — C83.00 SMALL LYMPHOCYTIC LYMPHOMA: Primary | ICD-10-CM

## 2024-01-18 DIAGNOSIS — E07.9 THYROID MASS: ICD-10-CM

## 2024-01-18 DIAGNOSIS — D05.02 NEOPLASM OF LEFT BREAST, PRIMARY TUMOR STAGING CATEGORY TIS: LOBULAR CARCINOMA IN SITU (LCIS): ICD-10-CM

## 2024-01-18 DIAGNOSIS — C85.92 NON-HODGKIN LYMPHOMA OF INTRATHORACIC LYMPH NODES, UNSPECIFIED NON-HODGKIN LYMPHOMA TYPE: ICD-10-CM

## 2024-01-18 DIAGNOSIS — R59.1 LYMPHADENOPATHY: ICD-10-CM

## 2024-01-18 NOTE — PROGRESS NOTES
Subjective   REASON FOR FOLLOWUP:   1.  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 female with the above-noted history of LCIS of the left breast in 2000. She had lumpectomy and took 5 years of tamoxifen as breast cancer prevention. She finished her tamoxifen 17 years ago .     She had been seeing us annually in December but she had a recent visit to her primary care office for a palpable small lymph node in the right neck.  This led to a CT scan of the neck which showed some scattered lymphadenopathy and a thyroid mass.  Because of the scattered lymphadenopathy the radiologist recommended CT scans of the chest abdomen pelvis to evaluate for lymphoproliferative disorder.  The scans were performed on 7/8/2022 showing diffuse scattered lymphadenopathy.    She subsequently underwent a CT-guided axillary lymph node biopsy on 7/22/2022 with pathology returning a small lymphocytic lymphoma.    INTERVAL HISTORY:  She returns today for 6-month follow-up with 6-month interval surveillance CT scans of the neck chest abdomen pelvis.  The CT scans performed 1/11/2024 showed some slight interval increase in retroperitoneal and mesenteric nodes but no bulky adenopathy.      She reports she is still feeling fine and at this time we do not see any compelling reason to put her on specific treatment.  We did discuss that if and when we need to treat her we likely would start with single agent Rituxan.    Her annual screening mammogram was performed 8/18/2023 with no suspicious findings.    History of Present Illness     Past Medical History, Past Surgical History, Social History, Family History have been reviewed and are without significant changes except as mentioned.    Review of Systems   Constitutional:  Negative for activity change, appetite change, fatigue,  "fever and unexpected weight change.   HENT:  Negative for hearing loss, nosebleeds, trouble swallowing and voice change.    Eyes:  Negative for visual disturbance.   Respiratory:  Negative for cough, shortness of breath and wheezing.    Cardiovascular:  Negative for chest pain and palpitations.   Gastrointestinal:  Negative for abdominal pain, diarrhea, nausea and vomiting.   Genitourinary:  Negative for difficulty urinating, frequency, hematuria and urgency.   Musculoskeletal:  Negative for back pain and neck pain.   Skin:  Negative for rash.   Neurological:  Negative for dizziness, seizures, syncope and headaches.   Hematological:  Negative for adenopathy. Does not bruise/bleed easily.   Psychiatric/Behavioral:  Negative for behavioral problems. The patient is not nervous/anxious.      A comprehensive 14 point review of systems was performed and was negative except as mentioned.    Medications:  The current medication list was reviewed in the EMR    ALLERGIES:  No Known Allergies    Objective      Vitals:    01/18/24 1101   BP: 154/73   Pulse: 70   Resp: 18   Temp: 98 °F (36.7 °C)   TempSrc: Temporal   SpO2: 100%   Weight: 55.8 kg (123 lb)   Height: 160 cm (63\")   PainSc: 0-No pain           1/18/2024    10:56 AM   Current Status   ECOG score 0       Physical Exam   Constitutional: She is oriented to person, place, and time. She appears well-developed. No distress.   HENT:   Head: Normocephalic.   Eyes: Pupils are equal, round, and reactive to light.   Neck: No JVD present. No thyromegaly present.   Cardiovascular: Normal rate and regular rhythm. Exam reveals no gallop and no friction rub.   No murmur heard.  Pulmonary/Chest: Effort normal and breath sounds normal. She has no wheezes. She has no rales. Right breast exhibits no mass, no nipple discharge and no skin change. Left breast exhibits no mass, no nipple discharge and no skin change.   Abdominal: Soft. Bowel sounds are normal. She exhibits no distension and " no mass. There is no abdominal tenderness.   Musculoskeletal: No deformity.   Lymphadenopathy:        Right cervical: Superficial cervical adenopathy present.   Freely mobile pea-sized right cervical lymph node.   Neurological: She is alert and oriented to person, place, and time. She has normal reflexes. No cranial nerve deficit.   Skin: Skin is dry. No rash noted.   Psychiatric: Judgment normal.     I have reexamined the patient and the results are consistent with the previously documented exam. Michael James MD       RECENT LABS:  Hematology WBC   Date Value Ref Range Status   01/11/2024 4.86 3.40 - 10.80 10*3/mm3 Final   10/26/2023 4.55 3.40 - 10.80 10*3/mm3 Final     RBC   Date Value Ref Range Status   01/11/2024 4.80 3.77 - 5.28 10*6/mm3 Final   10/26/2023 4.48 3.77 - 5.28 10*6/mm3 Final     Hemoglobin   Date Value Ref Range Status   01/11/2024 13.8 12.0 - 15.9 g/dL Final     Hematocrit   Date Value Ref Range Status   01/11/2024 42.9 34.0 - 46.6 % Final     Platelets   Date Value Ref Range Status   01/11/2024 246 140 - 450 10*3/mm3 Final            Lab Results   Component Value Date    GLUCOSE 88 01/11/2024    BUN 16 01/11/2024    CREATININE 0.74 01/11/2024    EGFRRESULT 78.4 10/26/2023    EGFR 86.1 01/11/2024    BCR 21.6 01/11/2024    K 4.1 01/11/2024    CO2 25.2 01/11/2024    CALCIUM 9.6 01/11/2024    PROTENTOTREF 6.8 10/26/2023    ALBUMIN 4.6 01/11/2024    BILITOT 0.5 01/11/2024    AST 24 01/11/2024    ALT 18 01/11/2024         PATHOLOGY 7/22/2022  Final Diagnosis   1-2. Lymph Node, Right Axilla, Core Biopsies for Adenopathy ([id number]): SMALL LYMPHOCYTIC LYMPHOMA (SLL/CLL).     Special Stains    Utilizing appropriate controls, multiple immunohistochemical stains are performed including CD3, CD5, CD10, CD20, CD79A, BCL-2, BCL-6 and Cyclin D1.  The neoplastic B cells stain positive for CD20, CD79A and BCL-2.  CD3 highlights interspersed T cells.  Cyclin D1 is negative. CD5 appears positive by IHC.    [facility]/cuong    Flow Cytometry Summary    [id number]: IMMUNOPHENOTYPING REVEALS A CD5 NEGATIVE, CD10 NEGATIVE MONOCLONAL [place] B CELL POPULATION WITHIN THE SMALL CELL REGION REPRESENTING 17 % OF TOTAL EVENTS.     IMAGING:  CT NECK, CHEST, ABDOMEN, AND PELVIS WITH IV CONTRAST  1/11/2024  FINDINGS:  Neck:     The visualized intracranial contents are normal. The parapharyngeal fat  pads are symmetric. No enlarged submandibular, posterior cervical chain  lymph nodes are seen. There are prominent bilateral jugular chain lymph  nodes, for example on the right measuring 8 mm in short axis diameter on  series 3, image 32, stable and on the left measuring the same at the  same level, stable. No enlarged supraclavicular lymph nodes are seen.     The visualized orbits, paranasal sinuses, and mastoids are normal.  Degenerative changes are seen in the cervical spine, without suspicious  osseous lesion seen.     CHEST:  Atelectasis is seen in the lungs with apical pleural-parenchymal  scarring. Old granulomatous disease is seen. No consolidation, pleural  effusion, significant size nodule or mass, or pneumothorax are seen. The  central airways are patent.     No enlarged clavicular, axillary, mediastinal, or hilar lymph nodes are  seen. The mediastinal vasculature is normal in caliber. The heart is  normal in size and configuration, without pericardial effusion.     Pelvis:     Old granulomatous disease is seen in the liver and spleen.  Low-attenuation hepatic and renal lesions are technically indeterminate,  likely cysts. Nonobstructing stones are seen in the kidneys. There is a  stable prominent gastrohepatic ligament lymph node, measuring 9 mm in  short axis diameter on series 3, image 90. A portacaval lymph node  measures 1.8 cm in short axis diameter on series 3, image 96, previously  1.6 cm. Retroperitoneal lymph nodes are increased in size, including a  left para-aortic node measuring 1.7 cm in short axis diameter,  previously  1.4 cm. There is mild interval progression of mesenteric  lymphadenopathy.     The gallbladder, adrenal glands, pancreas, stomach, small bowel, large  bowel, uterus, urinary bladder, and abdominal vasculature are normal. No  intraperitoneal fluid collection or free gas are seen.     Bone windows demonstrate degenerative changes, without suspicious  osseous lesion seen.     IMPRESSION:  1. Progression of upper abdominal, retroperitoneal, and mesenteric  lymphadenopathy     2. Stable cervical lymph nodes     3. Additional stable incidental findings as above.      F-18 FDG PET SKULL BASE TO MID THIGH WITH PET CT FUSION 8/4/2022  IMPRESSION:  1. The most intensely hypermetabolic lymphadenopathy is at the  retroperitoneum and the intensity is slightly greater than that of blood  pool. The rest of the lymphadenopathy has blood pool range metabolic  activity. There is no suspicious splenic or bone activity.  2. Conservative surveillance of the 1.4 cm anterior hepatic segment  lesion is recommended with a contrast-enhanced CT of the abdomen in 3-4  Months.    SCREENING MAMMOGRAM WITH R2 COMPUTERIZED ASSISTED DETECTION AND DIGITAL  TOMOSYNTHESIS 8/18/202  IMPRESSION/RECOMMENDATION(S):  No mammographic evidence of malignancy. Recommend annual screening  mammogram in one year. Supplemental screening ultrasound or MRI should  be considered due to dense breast tissue.     BI-RADS Category 2: Benign     Assessment & Plan     ASSESSMENT:   1. 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 She continues to do well with no evidence of malignancy on exam or on her mammograms.   2.  Small lymphocytic lymphoma with no bulky lymphadenopathy.  Her latest scan as noted above showed very minimal increase in size of some of the retroperitoneal and mesenteric nodes.  3.  Right lobe thyroid mass.  She underwent right hemithyroidectomy with Dr. Whitley 4/27/2023  with no malignancy.  Pathology showed benign adenoma.    PLAN:   We reviewed the results of the CT scans and labs from 1/11/2024 with the patient and provided her printed copy of the reports.  Currently at this time she appears to be relatively stable and asymptomatic therefore we will continue to observe her without initiation of treatment at this time.  We will schedule MD follow-up in our office in 6 months with labs and CT scans of the neck chest abdomen and pelvis performed 1 week prior to that visit.  We discussed that if we do treat her in the future he likely will involve single agent Rituxan therapy weekly for 4 weeks followed by maintenance treatment if she has good tolerance and response.  She will be due for her bilateral screening mammogram in late August or early September 2024.                  1/18/2024      CC:

## 2024-01-19 DIAGNOSIS — E89.0 POSTOPERATIVE HYPOTHYROIDISM: ICD-10-CM

## 2024-01-19 RX ORDER — LEVOTHYROXINE SODIUM 0.03 MG/1
25 TABLET ORAL EVERY MORNING
Qty: 90 TABLET | Refills: 0 | OUTPATIENT
Start: 2024-01-19

## 2024-04-24 ENCOUNTER — OFFICE VISIT (OUTPATIENT)
Dept: INTERNAL MEDICINE | Facility: CLINIC | Age: 74
End: 2024-04-24
Payer: MEDICARE

## 2024-04-24 ENCOUNTER — TELEPHONE (OUTPATIENT)
Dept: INTERNAL MEDICINE | Facility: CLINIC | Age: 74
End: 2024-04-24

## 2024-04-24 VITALS
DIASTOLIC BLOOD PRESSURE: 80 MMHG | HEART RATE: 72 BPM | HEIGHT: 63 IN | BODY MASS INDEX: 21.79 KG/M2 | SYSTOLIC BLOOD PRESSURE: 120 MMHG | OXYGEN SATURATION: 99 % | WEIGHT: 123 LBS

## 2024-04-24 DIAGNOSIS — J30.2 SEASONAL ALLERGIC RHINITIS, UNSPECIFIED TRIGGER: Chronic | ICD-10-CM

## 2024-04-24 DIAGNOSIS — Z78.0 POST-MENOPAUSAL: ICD-10-CM

## 2024-04-24 DIAGNOSIS — Z12.31 SCREENING MAMMOGRAM FOR BREAST CANCER: ICD-10-CM

## 2024-04-24 DIAGNOSIS — E78.2 MIXED HYPERLIPIDEMIA: Chronic | ICD-10-CM

## 2024-04-24 DIAGNOSIS — E89.0 POSTOPERATIVE HYPOTHYROIDISM: Primary | ICD-10-CM

## 2024-04-24 DIAGNOSIS — L70.8 OTHER ACNE: ICD-10-CM

## 2024-04-24 DIAGNOSIS — C83.00 SMALL LYMPHOCYTIC LYMPHOMA: ICD-10-CM

## 2024-04-24 LAB
ALBUMIN SERPL-MCNC: 4.9 G/DL (ref 3.5–5.2)
ALBUMIN/GLOB SERPL: 2.7 G/DL
ALP SERPL-CCNC: 56 U/L (ref 39–117)
ALT SERPL-CCNC: 20 U/L (ref 1–33)
AST SERPL-CCNC: 23 U/L (ref 1–32)
BASOPHILS # BLD AUTO: 0.05 10*3/MM3 (ref 0–0.2)
BASOPHILS NFR BLD AUTO: 0.7 % (ref 0–1.5)
BILIRUB SERPL-MCNC: 0.5 MG/DL (ref 0–1.2)
BUN SERPL-MCNC: 15 MG/DL (ref 8–23)
BUN/CREAT SERPL: 19.2 (ref 7–25)
CALCIUM SERPL-MCNC: 9.8 MG/DL (ref 8.6–10.5)
CHLORIDE SERPL-SCNC: 104 MMOL/L (ref 98–107)
CHOLEST SERPL-MCNC: 154 MG/DL (ref 0–200)
CO2 SERPL-SCNC: 27.3 MMOL/L (ref 22–29)
CREAT SERPL-MCNC: 0.78 MG/DL (ref 0.57–1)
EGFRCR SERPLBLD CKD-EPI 2021: 80.3 ML/MIN/1.73
EOSINOPHIL # BLD AUTO: 0.04 10*3/MM3 (ref 0–0.4)
EOSINOPHIL NFR BLD AUTO: 0.6 % (ref 0.3–6.2)
ERYTHROCYTE [DISTWIDTH] IN BLOOD BY AUTOMATED COUNT: 12.8 % (ref 12.3–15.4)
GLOBULIN SER CALC-MCNC: 1.8 GM/DL
GLUCOSE SERPL-MCNC: 91 MG/DL (ref 65–99)
HCT VFR BLD AUTO: 43.2 % (ref 34–46.6)
HDLC SERPL-MCNC: 62 MG/DL (ref 40–60)
HGB BLD-MCNC: 13.9 G/DL (ref 12–15.9)
IMM GRANULOCYTES # BLD AUTO: 0.02 10*3/MM3 (ref 0–0.05)
IMM GRANULOCYTES NFR BLD AUTO: 0.3 % (ref 0–0.5)
LDLC SERPL CALC-MCNC: 76 MG/DL (ref 0–100)
LYMPHOCYTES # BLD AUTO: 1.49 10*3/MM3 (ref 0.7–3.1)
LYMPHOCYTES NFR BLD AUTO: 21.8 % (ref 19.6–45.3)
MCH RBC QN AUTO: 29.6 PG (ref 26.6–33)
MCHC RBC AUTO-ENTMCNC: 32.2 G/DL (ref 31.5–35.7)
MCV RBC AUTO: 92.1 FL (ref 79–97)
MONOCYTES # BLD AUTO: 0.51 10*3/MM3 (ref 0.1–0.9)
MONOCYTES NFR BLD AUTO: 7.4 % (ref 5–12)
NEUTROPHILS # BLD AUTO: 4.74 10*3/MM3 (ref 1.7–7)
NEUTROPHILS NFR BLD AUTO: 69.2 % (ref 42.7–76)
NRBC BLD AUTO-RTO: 0 /100 WBC (ref 0–0.2)
PLATELET # BLD AUTO: 219 10*3/MM3 (ref 140–450)
POTASSIUM SERPL-SCNC: 4.6 MMOL/L (ref 3.5–5.2)
PROT SERPL-MCNC: 6.7 G/DL (ref 6–8.5)
RBC # BLD AUTO: 4.69 10*6/MM3 (ref 3.77–5.28)
SODIUM SERPL-SCNC: 140 MMOL/L (ref 136–145)
TRIGL SERPL-MCNC: 86 MG/DL (ref 0–150)
TSH SERPL DL<=0.005 MIU/L-ACNC: 2.89 UIU/ML (ref 0.27–4.2)
VLDLC SERPL CALC-MCNC: 16 MG/DL (ref 5–40)
WBC # BLD AUTO: 6.85 10*3/MM3 (ref 3.4–10.8)

## 2024-04-24 PROCEDURE — 1160F RVW MEDS BY RX/DR IN RCRD: CPT | Performed by: NURSE PRACTITIONER

## 2024-04-24 PROCEDURE — G2211 COMPLEX E/M VISIT ADD ON: HCPCS | Performed by: NURSE PRACTITIONER

## 2024-04-24 PROCEDURE — 99214 OFFICE O/P EST MOD 30 MIN: CPT | Performed by: NURSE PRACTITIONER

## 2024-04-24 PROCEDURE — 1159F MED LIST DOCD IN RCRD: CPT | Performed by: NURSE PRACTITIONER

## 2024-04-24 NOTE — TELEPHONE ENCOUNTER
I had patient Mag Johnson calling office after being seen today she went downstairs to Pharmacy at our office to get a tetanus injection they stated patient needs order for tetanus injection. I explained to patient will need to send a message she stated would try at [facility]

## 2024-04-24 NOTE — TELEPHONE ENCOUNTER
I called the pharmacy and they stated they do not need us to order anything.     When I called the pharmacy they said the patient was still there waiting

## 2024-04-24 NOTE — PROGRESS NOTES
"Chief Complaint  Hyperlipidemia and Hypothyroidism    Subjective        Mag Palomino presents to [facility]  History of Present Illness  This is a 75 y/o female presenting to office for f/u with chronic health conditions.     S/p partial thyroidectomy-- now postoperative hypothyroid-- on levothyroxine 50mcg daily;    Continues following with oncology for hx of lobular carcinoma in situ of left breast s/p excision and oral therapy; alsosmall lymphocytic lymphoma-- recent scans 1/11/24 showed minimal increase size in nodes; has f/u in 6 months with repeat scans before hand.     HLD-- continues on statin therapy, omega-3's;     Objective   Vital Signs:  /80 (BP Location: Left arm, Patient Position: Sitting, Cuff Size: Adult)   Pulse 72   Ht 160 cm (63\")   Wt 55.8 kg (123 lb)   SpO2 99%   BMI 21.79 kg/m²   Estimated body mass index is 21.79 kg/m² as calculated from the following:    Height as of this encounter: 160 cm (63\").    Weight as of this encounter: 55.8 kg (123 lb).       BMI is within normal parameters. No other follow-up for BMI required.      Physical Exam  Constitutional:       General: She is awake.      Appearance: Normal appearance.   HENT:      Head: Normocephalic and atraumatic.      Right Ear: Hearing and external ear normal.      Left Ear: Hearing and external ear normal.      Nose: Nose normal.      Mouth/Throat:      Lips: Pink.      Mouth: Mucous membranes are moist.      Pharynx: Oropharynx is clear.   Eyes:      Extraocular Movements: Extraocular movements intact.      Conjunctiva/sclera: Conjunctivae normal.      Pupils: Pupils are equal, round, and reactive to light.   Cardiovascular:      Rate and Rhythm: Normal rate and regular rhythm.      Pulses: Normal pulses.      Heart sounds: Normal heart sounds. No murmur heard.     No gallop.   Pulmonary:      Effort: Pulmonary effort is normal. No respiratory distress.      Breath sounds: Normal breath " sounds. No stridor. No wheezing, rhonchi or rales.   Musculoskeletal:      Cervical back: Normal range of motion and neck supple.   Skin:     General: Skin is warm and dry.      Capillary Refill: Capillary refill takes less than 2 seconds.   Neurological:      General: No focal deficit present.      Mental Status: She is alert and oriented to person, place, and time. Mental status is at baseline.      Motor: Motor function is intact.      Coordination: Coordination is intact.      Gait: Gait is intact.      Deep Tendon Reflexes: Reflexes are normal and symmetric.   Psychiatric:         Attention and Perception: Attention normal.         Mood and Affect: Mood normal.         Speech: Speech normal.         Behavior: Behavior normal. Behavior is cooperative.         Thought Content: Thought content normal.         Cognition and Memory: Cognition normal.         Judgment: Judgment normal.        Result Review :    The following data was reviewed by: LIZBETH Navarro on 04/24/2024:  Common labs          7/6/2023    09:54 10/26/2023    10:07 1/11/2024    09:00   Common Labs   Glucose 91  89  88    BUN 18  13  16    Creatinine 0.89     0.90  0.80  0.74    Sodium 141  141  141    Potassium 4.5  4.8  4.1    Chloride 103  104  103    Calcium 10.1  10.1  9.6    Total Protein 7.0  6.8     Albumin 5.2     4.3  5.0  4.6    Total Bilirubin 0.6  0.6  0.5    Alkaline Phosphatase 58  50  52    AST (SGOT) 27  27  24    ALT (SGPT) 18  22  18    WBC 5.67  4.55  4.86    Hemoglobin 14.7  13.7  13.8    Hematocrit 43.3  40.3  42.9    Platelets 194  205  246    Total Cholesterol  151     Triglycerides  70     HDL Cholesterol  65     LDL Cholesterol   72       Tobacco Use: Low Risk  (4/24/2024)    Patient History     Smoking Tobacco Use: Never     Smokeless Tobacco Use: Never     Passive Exposure: Not on file     Social History     Substance and Sexual Activity   Alcohol Use Yes    Alcohol/week: 2.0 - 3.0 standard drinks of alcohol     Types: 1 - 2 Glasses of wine, 1 Cans of beer per week    Comment: social     Family History   Problem Relation Age of Onset    Hyperlipidemia Mother     Heart attack Father     Alcohol abuse Father     Heart disease Father     Cancer Sister     Thyroid disease Sister     Heart disease Sister     Anxiety disorder Sister     Depression Sister     Thyroid disease Sister     Cancer Brother     Heart disease Brother     COPD Brother     Ulcers Other     Polycythemia Other     Hyperlipidemia Other     Malig Hyperthermia Neg Hx        CT Soft Tissue Neck With Contrast (01/11/2024 9:30 AM)  CT Abdomen Pelvis With Contrast (01/11/2024 9:30 AM)  CT Chest With Contrast Diagnostic (01/11/2024 9:30 AM)  TSH Rfx On Abnormal To Free T4 (01/31/2024 9:03 AM)          Assessment and Plan     Diagnoses and all orders for this visit:    1. Postoperative hypothyroidism (Primary)  Assessment & Plan:  Continues on synthroid   Lab today       Orders:  -     TSH Rfx On Abnormal To Free T4    2. Screening mammogram for breast cancer  -     Mammo Screening Digital Tomosynthesis Bilateral With CAD; Future    3. Mixed hyperlipidemia  Assessment & Plan:   Lipid abnormalities are improving with treatment    Plan:  Continue same medication/s without change.      Discussed medication dosage, use, side effects, and goals of treatment in detail.    Counseled patient on lifestyle modifications to help control hyperlipidemia.     Patient Treatment Goals:   LDL goal is under 100    Followup at the next regular appointment.    Orders:  -     TSH Rfx On Abnormal To Free T4  -     Lipid panel    4. Small lymphocytic lymphoma  Assessment & Plan:  Continues following with oncology  Has f/u appt with labs/scans in 6 months for recheck    Orders:  -     Comprehensive metabolic panel  -     CBC & Differential    5. Seasonal allergic rhinitis, unspecified trigger  Assessment & Plan:  Continues on zyrtec       6. Post-menopausal  -     DEXA Bone Density Axial;  Future    7. Other acne  Assessment & Plan:  Continues on aldactone               Follow Up     Return in about 6 months (around 10/24/2024) for Medicare Wellness.  Patient was given instructions and counseling regarding her condition or for health maintenance advice. Please see specific information pulled into the AVS if appropriate.         Answers submitted by the patient for this visit:  Other (Submitted on 4/17/2024)  Please describe your symptoms.: Seasonal Allergies  Have you had these symptoms before?: Yes  How long have you been having these symptoms?: Greater than 2 weeks  Please list any medications you are currently taking for this condition.: generic zyrtec  Please describe any probable cause for these symptoms. : tree pollen  Primary Reason for Visit (Submitted on 4/17/2024)  What is the primary reason for your visit?: Other

## 2024-05-14 DIAGNOSIS — E78.2 MIXED HYPERLIPIDEMIA: ICD-10-CM

## 2024-05-14 RX ORDER — ATORVASTATIN CALCIUM 20 MG/1
20 TABLET, FILM COATED ORAL DAILY
Qty: 90 TABLET | Refills: 1 | Status: SHIPPED | OUTPATIENT
Start: 2024-05-14

## 2024-07-25 ENCOUNTER — LAB (OUTPATIENT)
Facility: HOSPITAL | Age: 74
End: 2024-07-25
Payer: MEDICARE

## 2024-07-25 ENCOUNTER — HOSPITAL ENCOUNTER (OUTPATIENT)
Facility: HOSPITAL | Age: 74
Discharge: HOME | End: 2024-07-25
Payer: MEDICARE

## 2024-07-25 DIAGNOSIS — C85.92 NON-HODGKIN LYMPHOMA OF INTRATHORACIC LYMPH NODES, UNSPECIFIED NON-HODGKIN LYMPHOMA TYPE: ICD-10-CM

## 2024-07-25 DIAGNOSIS — C83.00 SMALL LYMPHOCYTIC LYMPHOMA: ICD-10-CM

## 2024-07-25 DIAGNOSIS — R59.1 LYMPHADENOPATHY: ICD-10-CM

## 2024-07-25 DIAGNOSIS — Z85.3 HISTORY OF BREAST CANCER: ICD-10-CM

## 2024-07-25 DIAGNOSIS — E07.9 THYROID MASS: ICD-10-CM

## 2024-07-25 DIAGNOSIS — D05.02 NEOPLASM OF LEFT BREAST, PRIMARY TUMOR STAGING CATEGORY TIS: LOBULAR CARCINOMA IN SITU (LCIS): ICD-10-CM

## 2024-07-25 LAB
ALBUMIN SERPL-MCNC: 4.8 G/DL (ref 3.5–5.2)
ALBUMIN/GLOB SERPL: 1.9 G/DL
ALP SERPL-CCNC: 52 U/L (ref 39–117)
ALT SERPL W P-5'-P-CCNC: 21 U/L (ref 1–33)
ANION GAP SERPL CALCULATED.3IONS-SCNC: 11.3 MMOL/L (ref 5–15)
AST SERPL-CCNC: 28 U/L (ref 1–32)
BASOPHILS # BLD AUTO: 0.03 10*3/MM3 (ref 0–0.2)
BASOPHILS NFR BLD AUTO: 0.5 % (ref 0–1.5)
BILIRUB SERPL-MCNC: 0.4 MG/DL (ref 0–1.2)
BUN SERPL-MCNC: 13 MG/DL (ref 8–23)
BUN/CREAT SERPL: 15.7 (ref 7–25)
CALCIUM SPEC-SCNC: 9.5 MG/DL (ref 8.6–10.5)
CHLORIDE SERPL-SCNC: 102 MMOL/L (ref 98–107)
CO2 SERPL-SCNC: 25.7 MMOL/L (ref 22–29)
CREAT SERPL-MCNC: 0.83 MG/DL (ref 0.57–1)
DEPRECATED RDW RBC AUTO: 43.3 FL (ref 37–54)
EGFRCR SERPLBLD CKD-EPI 2021: 74.5 ML/MIN/1.73
EOSINOPHIL # BLD AUTO: 0.05 10*3/MM3 (ref 0–0.4)
EOSINOPHIL NFR BLD AUTO: 0.9 % (ref 0.3–6.2)
ERYTHROCYTE [DISTWIDTH] IN BLOOD BY AUTOMATED COUNT: 12.9 % (ref 12.3–15.4)
GLOBULIN UR ELPH-MCNC: 2.5 GM/DL
GLUCOSE SERPL-MCNC: 82 MG/DL (ref 65–99)
HCT VFR BLD AUTO: 43.1 % (ref 34–46.6)
HGB BLD-MCNC: 14.1 G/DL (ref 12–15.9)
IMM GRANULOCYTES # BLD AUTO: 0.02 10*3/MM3 (ref 0–0.05)
IMM GRANULOCYTES NFR BLD AUTO: 0.3 % (ref 0–0.5)
LDH SERPL-CCNC: 219 U/L (ref 135–214)
LYMPHOCYTES # BLD AUTO: 1.69 10*3/MM3 (ref 0.7–3.1)
LYMPHOCYTES NFR BLD AUTO: 28.9 % (ref 19.6–45.3)
MCH RBC QN AUTO: 30.3 PG (ref 26.6–33)
MCHC RBC AUTO-ENTMCNC: 32.7 G/DL (ref 31.5–35.7)
MCV RBC AUTO: 92.5 FL (ref 79–97)
MONOCYTES # BLD AUTO: 0.53 10*3/MM3 (ref 0.1–0.9)
MONOCYTES NFR BLD AUTO: 9.1 % (ref 5–12)
NEUTROPHILS NFR BLD AUTO: 3.53 10*3/MM3 (ref 1.7–7)
NEUTROPHILS NFR BLD AUTO: 60.3 % (ref 42.7–76)
NRBC BLD AUTO-RTO: 0 /100 WBC (ref 0–0.2)
PLATELET # BLD AUTO: 211 10*3/MM3 (ref 140–450)
PMV BLD AUTO: 10.3 FL (ref 6–12)
POTASSIUM SERPL-SCNC: 4.4 MMOL/L (ref 3.5–5.2)
PROT SERPL-MCNC: 7.3 G/DL (ref 6–8.5)
RBC # BLD AUTO: 4.66 10*6/MM3 (ref 3.77–5.28)
SODIUM SERPL-SCNC: 139 MMOL/L (ref 136–145)
WBC NRBC COR # BLD AUTO: 5.85 10*3/MM3 (ref 3.4–10.8)

## 2024-07-25 PROCEDURE — 71260 CT THORAX DX C+: CPT

## 2024-07-25 PROCEDURE — 85025 COMPLETE CBC W/AUTO DIFF WBC: CPT | Performed by: INTERNAL MEDICINE

## 2024-07-25 PROCEDURE — 70491 CT SOFT TISSUE NECK W/DYE: CPT

## 2024-07-25 PROCEDURE — 83615 LACTATE (LD) (LDH) ENZYME: CPT | Performed by: INTERNAL MEDICINE

## 2024-07-25 PROCEDURE — 80053 COMPREHEN METABOLIC PANEL: CPT

## 2024-07-25 PROCEDURE — 25510000001 IOPAMIDOL 61 % SOLUTION: Performed by: INTERNAL MEDICINE

## 2024-07-25 PROCEDURE — 0 DIATRIZOATE MEGLUMINE & SODIUM PER 1 ML: Performed by: INTERNAL MEDICINE

## 2024-07-25 PROCEDURE — 74177 CT ABD & PELVIS W/CONTRAST: CPT

## 2024-07-25 RX ADMIN — DIATRIZOATE MEGLUMINE AND DIATRIZOATE SODIUM 30 ML: 600; 100 SOLUTION ORAL; RECTAL at 07:46

## 2024-07-25 RX ADMIN — IOPAMIDOL 85 ML: 612 INJECTION, SOLUTION INTRAVENOUS at 09:18

## 2024-07-30 NOTE — PROGRESS NOTES
"REASON FOR FOLLOWUP:   1.  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  Small lymphocytic lymphoma from axillary lymph node biopsy 7/22/2022    INTERVAL HISTORY:  Mag Palomino is a 74 y.o. female who returns today for follow up    She returns today for 6-month follow-up.  She feels well.  No fevers, chills, night sweats, unintended weight loss.  She has a small left axillary node that she notices every once in a while.        PE:    Vitals:    08/01/24 0954   BP: 129/78   Pulse: 83   Temp: 98.6 °F (37 °C)   TempSrc: Oral   SpO2: 99%   Weight: 55.4 kg (122 lb 3.2 oz)   Height: 160 cm (62.99\")   PainSc: 0-No pain       Physical Exam   Constitutional: She is oriented to person, place, and time. She appears well-developed. No distress.   HENT:   Head: Normocephalic.   Eyes: Pupils are equal, round, and reactive to light.   Neck: No JVD present. No thyromegaly present.   Cardiovascular: Normal rate and regular rhythm. Exam reveals no gallop and no friction rub.   No murmur heard.  Pulmonary/Chest: Effort normal and breath sounds normal. She has no wheezes. She has no rales. Right breast exhibits no mass, no nipple discharge and no skin change. Left breast exhibits no mass, no nipple discharge and no skin change.   Abdominal: Soft. Bowel sounds are normal. She exhibits no distension and no mass. There is no abdominal tenderness.   Musculoskeletal: No deformity.   Lymphadenopathy:        Right cervical: Superficial cervical (Small palpable right anterior cervical node) adenopathy present.   Neurological: She is alert and oriented to person, place, and time. She has normal reflexes. No cranial nerve deficit.   Skin: Skin is dry. No rash noted.   Psychiatric: Judgment normal.         RECENT LABS:  Comprehensive Metabolic Panel (07/25/2024 08:58)  CBC & Differential (07/25/2024 08:58)  Lactate Dehydrogenase (07/25/2024 08:58)      IMAGING:  CT Soft Tissue Neck With Contrast " (07/25/2024 09:20)  CT Chest With Contrast Diagnostic (07/25/2024 09:20)  CT Abdomen Pelvis With Contrast (07/25/2024 09:20)    CT images personally reviewed.  Similar axillary adenopathy, stable in size measuring up to 2.1 cm, from 2.4 cm.  Small abdominal and pelvic adenopathy, slightly enlarged, largest left para-aortic node 2.0 cm compared to 1.7 cm previously.      ASSESSMENT:    *History of lobular carcinoma in situ, excision, 5 years tamoxifen complete April 2005    *Small lymphocytic lymphoma, very small, no treatment required at this time    *History of right lobe thyroid mass status post right hemithyroidectomy with Dr. Whitley 4/27/2023.  No malignancy identified.  Benign adenoma.        PLAN:   Proceed with observation.  Her LDH is very mildly elevated today but lymph nodes on CT scan are just very minimally increased in size.  She remains asymptomatic.    Proceed with labs and CT imaging in 6 months and I will see her back after that for follow-up.  We are certainly available sooner if needed.  Dr. James previously discussed if treatment is necessary proceeding with 4 weeks of weekly rituximab followed by maintenance therapy if appropriate.  Mammogram scheduled in the near future on 8/19 8/1/2024

## 2024-08-01 ENCOUNTER — OFFICE VISIT (OUTPATIENT)
Dept: ONCOLOGY | Facility: CLINIC | Age: 74
End: 2024-08-01
Payer: MEDICARE

## 2024-08-01 VITALS
TEMPERATURE: 98.6 F | OXYGEN SATURATION: 99 % | HEART RATE: 83 BPM | WEIGHT: 122.2 LBS | DIASTOLIC BLOOD PRESSURE: 78 MMHG | HEIGHT: 63 IN | SYSTOLIC BLOOD PRESSURE: 129 MMHG | BODY MASS INDEX: 21.65 KG/M2

## 2024-08-01 DIAGNOSIS — C83.00 SMALL LYMPHOCYTIC LYMPHOMA: Primary | ICD-10-CM

## 2024-08-01 PROCEDURE — 1159F MED LIST DOCD IN RCRD: CPT | Performed by: INTERNAL MEDICINE

## 2024-08-01 PROCEDURE — 1126F AMNT PAIN NOTED NONE PRSNT: CPT | Performed by: INTERNAL MEDICINE

## 2024-08-01 PROCEDURE — 1160F RVW MEDS BY RX/DR IN RCRD: CPT | Performed by: INTERNAL MEDICINE

## 2024-08-01 PROCEDURE — 99214 OFFICE O/P EST MOD 30 MIN: CPT | Performed by: INTERNAL MEDICINE

## 2024-08-19 ENCOUNTER — HOSPITAL ENCOUNTER (OUTPATIENT)
Dept: MAMMOGRAPHY | Facility: HOSPITAL | Age: 74
Discharge: HOME | End: 2024-08-19
Admitting: NURSE PRACTITIONER
Payer: MEDICARE

## 2024-08-19 DIAGNOSIS — Z12.31 SCREENING MAMMOGRAM FOR BREAST CANCER: ICD-10-CM

## 2024-08-19 PROCEDURE — 77067 SCR MAMMO BI INCL CAD: CPT

## 2024-08-19 PROCEDURE — 77063 BREAST TOMOSYNTHESIS BI: CPT

## 2024-10-23 ENCOUNTER — OFFICE VISIT (OUTPATIENT)
Dept: INTERNAL MEDICINE | Facility: CLINIC | Age: 74
End: 2024-10-23
Payer: MEDICARE

## 2024-10-23 VITALS
OXYGEN SATURATION: 98 % | HEART RATE: 72 BPM | BODY MASS INDEX: 21.26 KG/M2 | HEIGHT: 63 IN | WEIGHT: 120 LBS | SYSTOLIC BLOOD PRESSURE: 130 MMHG | DIASTOLIC BLOOD PRESSURE: 84 MMHG

## 2024-10-23 DIAGNOSIS — Z23 ENCOUNTER FOR IMMUNIZATION: ICD-10-CM

## 2024-10-23 DIAGNOSIS — E89.0 POSTOPERATIVE HYPOTHYROIDISM: ICD-10-CM

## 2024-10-23 DIAGNOSIS — E78.2 MIXED HYPERLIPIDEMIA: Chronic | ICD-10-CM

## 2024-10-23 DIAGNOSIS — Z85.3 HISTORY OF BREAST CANCER: ICD-10-CM

## 2024-10-23 DIAGNOSIS — Z00.00 MEDICARE ANNUAL WELLNESS VISIT, SUBSEQUENT: Primary | ICD-10-CM

## 2024-10-23 DIAGNOSIS — C83.00 SMALL LYMPHOCYTIC LYMPHOMA: ICD-10-CM

## 2024-10-23 DIAGNOSIS — R12 HEARTBURN: ICD-10-CM

## 2024-10-23 DIAGNOSIS — Z23 NEED FOR INFLUENZA VACCINATION: ICD-10-CM

## 2024-10-23 LAB
CHOLEST SERPL-MCNC: 146 MG/DL (ref 0–200)
HDLC SERPL-MCNC: 62 MG/DL (ref 40–60)
LDLC SERPL CALC-MCNC: 69 MG/DL (ref 0–100)
TRIGL SERPL-MCNC: 77 MG/DL (ref 0–150)
TSH SERPL DL<=0.005 MIU/L-ACNC: 2.43 UIU/ML (ref 0.27–4.2)
VLDLC SERPL CALC-MCNC: 15 MG/DL (ref 5–40)

## 2024-10-23 PROCEDURE — 1160F RVW MEDS BY RX/DR IN RCRD: CPT | Performed by: NURSE PRACTITIONER

## 2024-10-23 PROCEDURE — 90662 IIV NO PRSV INCREASED AG IM: CPT | Performed by: NURSE PRACTITIONER

## 2024-10-23 PROCEDURE — 90480 ADMN SARSCOV2 VAC 1/ONLY CMP: CPT | Performed by: NURSE PRACTITIONER

## 2024-10-23 PROCEDURE — 1159F MED LIST DOCD IN RCRD: CPT | Performed by: NURSE PRACTITIONER

## 2024-10-23 PROCEDURE — 99214 OFFICE O/P EST MOD 30 MIN: CPT | Performed by: NURSE PRACTITIONER

## 2024-10-23 PROCEDURE — G0439 PPPS, SUBSEQ VISIT: HCPCS | Performed by: NURSE PRACTITIONER

## 2024-10-23 PROCEDURE — 1126F AMNT PAIN NOTED NONE PRSNT: CPT | Performed by: NURSE PRACTITIONER

## 2024-10-23 PROCEDURE — 91320 SARSCV2 VAC 30MCG TRS-SUC IM: CPT | Performed by: NURSE PRACTITIONER

## 2024-10-23 PROCEDURE — G0008 ADMIN INFLUENZA VIRUS VAC: HCPCS | Performed by: NURSE PRACTITIONER

## 2024-10-23 NOTE — PROGRESS NOTES
Subjective   The ABCs of the Annual Wellness Visit  Medicare Wellness Visit      Mag Palomino is a 74 y.o. patient who presents for a Medicare Wellness Visit.    The following portions of the patient's history were reviewed and   updated as appropriate: allergies, current medications, past family history, past medical history, past social history, past surgical history, and problem list.    Compared to one year ago, the patient's physical   health is the same.  Compared to one year ago, the patient's mental   health is the same.    Recent Hospitalizations:  She was not admitted to the hospital during the last year.     Current Medical Providers:  Patient Care Team:  Carol Jorge APRN as PCP - General (Internal Medicine)  Yessy Hui MD as Consulting Physician (Dermatology)  Stephanie Avendano MD as Consulting Physician (Ophthalmology)  Angel Baca MD as Consulting Physician (Hematology and Oncology)  Flavia Arenas APRN as Nurse Practitioner (Nurse Practitioner)    Outpatient Medications Prior to Visit   Medication Sig Dispense Refill    acetaminophen (TYLENOL) 325 MG tablet Take 2 tablets by mouth Every 6 (Six) Hours As Needed for Mild Pain.      atorvastatin (LIPITOR) 20 MG tablet TAKE 1 TABLET BY MOUTH DAILY 90 tablet 1    cetirizine (ZyrTEC) 10 MG tablet Take 1 tablet by mouth Every Night.      docusate sodium (COLACE) 100 MG capsule Take 1 capsule by mouth Daily.      lansoprazole (PREVACID) 15 MG capsule Take 1 capsule by mouth As Needed.      levothyroxine (Synthroid) 50 MCG tablet Take 1 tablet by mouth Daily. 90 tablet 3    Omega-3 Fatty Acids (fish oil) 1000 MG capsule capsule       spironolactone (ALDACTONE) 100 MG tablet Take 1 tablet by mouth Daily. ACNE       No facility-administered medications prior to visit.     No opioid medication identified on active medication list. I have reviewed chart for other potential  high risk medication/s and harmful drug interactions in the  "elderly.      Aspirin is not on active medication list.  Aspirin use is not indicated based on review of current medical condition/s. Risk of harm outweighs potential benefits.  .    Patient Active Problem List   Diagnosis    Allergic rhinitis    Hyperlipidemia    History of colonic polyps    Palpitation    Neoplasm of left breast, primary tumor staging category Tis: lobular carcinoma in situ (LCIS)    Constipation    Heartburn    Elevated liver function tests    Other acne    Medicare annual wellness visit, subsequent    Thyroid mass    Small lymphocytic lymphoma    History of breast cancer    Abnormal EKG    Postoperative hypothyroidism     Advance Care Planning Advance Directive is on file.  ACP discussion was held with the patient during this visit. Patient has an advance directive in EMR which is still valid.             Objective   Vitals:    10/23/24 0955   BP: 130/84   BP Location: Left arm   Patient Position: Sitting   Cuff Size: Adult   Pulse: 72   SpO2: 98%   Weight: 54.4 kg (120 lb)   Height: 160 cm (62.99\")   PainSc: 0-No pain       Estimated body mass index is 21.26 kg/m² as calculated from the following:    Height as of this encounter: 160 cm (62.99\").    Weight as of this encounter: 54.4 kg (120 lb).    BMI is within normal parameters. No other follow-up for BMI required.       Does the patient have evidence of cognitive impairment? No                                                                                                Health  Risk Assessment    Smoking Status:  Social History     Tobacco Use   Smoking Status Never   Smokeless Tobacco Never     Alcohol Consumption:  Social History     Substance and Sexual Activity   Alcohol Use Yes    Alcohol/week: 2.0 - 3.0 standard drinks of alcohol    Types: 1 - 2 Glasses of wine, 1 Cans of beer per week    Comment: social       Fall Risk Screen  LATONIAADI Fall Risk Assessment was completed, and patient is at LOW risk for falls.Assessment completed " on:10/23/2024    Depression Screening:      10/23/2024     9:57 AM   PHQ-2/PHQ-9 Depression Screening   Little interest or pleasure in doing things Not at all   Feeling down, depressed, or hopeless Not at all     Health Habits and Functional and Cognitive Screening:      10/17/2024     9:03 AM   Functional & Cognitive Status   Do you have difficulty preparing food and eating? No    Do you have difficulty bathing yourself, getting dressed or grooming yourself? No    Do you have difficulty using the toilet? No    Do you have difficulty moving around from place to place? No    Do you have trouble with steps or getting out of a bed or a chair? No    Current Diet Well Balanced Diet    Dental Exam Up to date    Eye Exam Up to date    Exercise (times per week) 5 times per week    Current Exercises Include Walking    Do you need help using the phone?  No    Are you deaf or do you have serious difficulty hearing?  No    Do you need help to go to places out of walking distance? No    Do you need help shopping? No    Do you need help preparing meals?  No    Do you need help with housework?  No    Do you need help with laundry? No    Do you need help taking your medications? No    Do you need help managing money? No    Do you ever drive or ride in a car without wearing a seat belt? No    Have you felt unusual stress, anger or loneliness in the last month? No    Who do you live with? Spouse    If you need help, do you have trouble finding someone available to you? Yes    Have you been bothered in the last four weeks by sexual problems? No    Do you have difficulty concentrating, remembering or making decisions? No        Patient-reported           Age-appropriate Screening Schedule:  Refer to the list below for future screening recommendations based on patient's age, sex and/or medical conditions. Orders for these recommended tests are listed in the plan section. The patient has been provided with a written plan.    Health  Maintenance List  Health Maintenance   Topic Date Due    INFLUENZA VACCINE  08/01/2024    COVID-19 Vaccine (7 - 2023-24 season) 09/01/2024    DXA SCAN  11/07/2024    LIPID PANEL  04/24/2025    MAMMOGRAM  08/19/2025    ANNUAL WELLNESS VISIT  10/23/2025    COLORECTAL CANCER SCREENING  12/14/2025    TDAP/TD VACCINES (3 - Td or Tdap) 04/24/2034    HEPATITIS C SCREENING  Completed    Pneumococcal Vaccine 65+  Completed    ZOSTER VACCINE  Completed    PAP SMEAR  Discontinued                                                                                                                                                CMS Preventative Services Quick Reference  Risk Factors Identified During Encounter  Immunizations Discussed/Encouraged: Influenza and COVID19  Dental Screening Recommended  Vision Screening Recommended    The above risks/problems have been discussed with the patient.  Pertinent information has been shared with the patient in the After Visit Summary.  An After Visit Summary and PPPS were made available to the patient.    Follow Up:   Next Medicare Wellness visit to be scheduled in 1 year.         Additional E&M Note during same encounter follows:  Patient has additional, significant, and separately identifiable condition(s)/problem(s) that require work above and beyond the Medicare Wellness Visit     Chief Complaint  Medicare Wellness-subsequent    Subjective   HPI  Mag is also being seen today for additional medical problem/s.    Continues on synthroid for postoperative hypothyroidism. Denies any current issues.     HLD-- continues on atorvastatin; continues with active lifestyle; following healthy diet choices.     Continues on aldactone for acne per dermatology.     Continues with prevacid for acid reflux OTC; reports overall things are stable. Denies any break through symptoms currently.    Continues following with CBC for surveillance of small lymphocytic lymphoma-- cont with q6 month blood  "work/scans; \"IMPRESSION:  COMBINED IMPRESSION:     1.  Abdominal and pelvic lymphadenopathy has slightly progressed from  1/11/2024. Cervical and thoracic lymph nodes are similar.  2.  Normal spleen size.  3.  Additional findings, as above.\" 7/25/24; hx breast cancer-- UTD mammogram.           Objective   Vital Signs:  /84 (BP Location: Left arm, Patient Position: Sitting, Cuff Size: Adult)   Pulse 72   Ht 160 cm (62.99\")   Wt 54.4 kg (120 lb)   SpO2 98%   BMI 21.26 kg/m²   Physical Exam  Constitutional:       Appearance: Normal appearance.   HENT:      Head: Normocephalic and atraumatic.      Right Ear: External ear normal.      Left Ear: External ear normal.      Nose: Nose normal.      Mouth/Throat:      Mouth: Mucous membranes are moist.      Pharynx: Oropharynx is clear.   Eyes:      Conjunctiva/sclera: Conjunctivae normal.      Pupils: Pupils are equal, round, and reactive to light.   Neck:      Vascular: No carotid bruit.   Cardiovascular:      Rate and Rhythm: Normal rate and regular rhythm.      Pulses: Normal pulses.      Heart sounds: Normal heart sounds. No murmur heard.     No friction rub. No gallop.   Pulmonary:      Effort: Pulmonary effort is normal. No respiratory distress.      Breath sounds: Normal breath sounds. No stridor. No wheezing, rhonchi or rales.   Musculoskeletal:      Cervical back: Normal range of motion and neck supple.   Skin:     General: Skin is warm and dry.      Capillary Refill: Capillary refill takes less than 2 seconds.   Neurological:      Mental Status: She is alert and oriented to person, place, and time. Mental status is at baseline.   Psychiatric:         Mood and Affect: Mood normal.         Thought Content: Thought content normal.         Judgment: Judgment normal.         The following data was reviewed by: LIZBETH Navarro on 10/23/2024:  Tobacco Use: Low Risk  (10/23/2024)    Patient History     Smoking Tobacco Use: Never     Smokeless Tobacco Use: " Never     Passive Exposure: Not on file     Social History     Substance and Sexual Activity   Alcohol Use Yes    Alcohol/week: 2.0 - 3.0 standard drinks of alcohol    Types: 1 - 2 Glasses of wine, 1 Cans of beer per week    Comment: social     Family History   Problem Relation Age of Onset    Hyperlipidemia Mother     Heart attack Father     Alcohol abuse Father     Heart disease Father     Cancer Sister     Thyroid disease Sister     Heart disease Sister     Anxiety disorder Sister     Depression Sister     Thyroid disease Sister     Cancer Brother     Heart disease Brother     COPD Brother     Ulcers Other     Polycythemia Other     Hyperlipidemia Other     Malig Hyperthermia Neg Hx              Assessment and Plan Additional age appropriate preventative wellness advice topics were discussed during today's preventative wellness exam(some topics already addressed during AWV portion of the note above):    Physical Activity: Advised cardiovascular activity 150 minutes per week as tolerated. (example brisk walk for 30 minutes, 5 days a week).     Nutrition: Discussed nutrition plan with patient. Information shared in after visit summary. Goal is for a well balanced diet to enhance overall health.     Healthy Weight: Discussed current and goal BMI with patient. Steps to attain this goal discussed. Information shared in after visit summary.              Medicare annual wellness visit, subsequent    Mixed hyperlipidemia   Lipid abnormalities are improving with treatment    Plan:  Continue same medication/s without change.      Discussed medication dosage, use, side effects, and goals of treatment in detail.    Counseled patient on lifestyle modifications to help control hyperlipidemia.     Patient Treatment Goals:   LDL goal is under 100    Followup at the next regular appointment.  Postoperative hypothyroidism  Continues on synthroid  Heartburn  Continues on prevacid OTC  History of breast cancer  Mammo UTD 2024  Small  lymphocytic lymphoma  Under surveillance  Following with Dr. Baca   7/25/24  CBC/CMP stable  Encounter for immunization      Orders Placed This Encounter   Procedures    COVID-19 ([employer]) 12yrs+ (COMIRNATY)    Lipid panel     Order Specific Question:   Release to patient     Answer:   Routine Release [[id number]]    TSH Rfx On Abnormal To Free T4     Order Specific Question:   Release to patient     Answer:   Routine Release [[id number]]             Follow Up   Return in about 6 months (around 4/23/2025) for Recheck chronic conditions.  Patient was given instructions and counseling regarding her condition or for health maintenance advice. Please see specific information pulled into the AVS if appropriate.

## 2024-10-23 NOTE — ASSESSMENT & PLAN NOTE
History of right lobe thyroid mass status post right hemithyroidectomy with Dr. Whitley 4/27/2023   Continues on synthroid

## 2024-11-08 ENCOUNTER — APPOINTMENT (OUTPATIENT)
Dept: BONE DENSITY | Facility: HOSPITAL | Age: 74
End: 2024-11-08
Payer: MEDICARE

## 2024-11-08 DIAGNOSIS — Z78.0 POST-MENOPAUSAL: ICD-10-CM

## 2024-11-08 PROCEDURE — 77080 DXA BONE DENSITY AXIAL: CPT

## 2024-11-09 DIAGNOSIS — E78.2 MIXED HYPERLIPIDEMIA: ICD-10-CM

## 2024-11-11 RX ORDER — ATORVASTATIN CALCIUM 20 MG/1
20 TABLET, FILM COATED ORAL DAILY
Qty: 90 TABLET | Refills: 1 | Status: SHIPPED | OUTPATIENT
Start: 2024-11-11

## 2024-11-13 ENCOUNTER — OFFICE VISIT (OUTPATIENT)
Dept: CARDIOLOGY | Facility: CLINIC | Age: 74
End: 2024-11-13
Payer: MEDICARE

## 2024-11-13 VITALS
HEART RATE: 78 BPM | OXYGEN SATURATION: 99 % | BODY MASS INDEX: 21.09 KG/M2 | HEIGHT: 63 IN | DIASTOLIC BLOOD PRESSURE: 70 MMHG | WEIGHT: 119 LBS | SYSTOLIC BLOOD PRESSURE: 138 MMHG

## 2024-11-13 DIAGNOSIS — R94.31 ABNORMAL EKG: Primary | ICD-10-CM

## 2024-11-13 PROCEDURE — 99214 OFFICE O/P EST MOD 30 MIN: CPT | Performed by: NURSE PRACTITIONER

## 2024-11-13 PROCEDURE — 1159F MED LIST DOCD IN RCRD: CPT | Performed by: NURSE PRACTITIONER

## 2024-11-13 PROCEDURE — 93000 ELECTROCARDIOGRAM COMPLETE: CPT | Performed by: NURSE PRACTITIONER

## 2024-11-13 PROCEDURE — 1160F RVW MEDS BY RX/DR IN RCRD: CPT | Performed by: NURSE PRACTITIONER

## 2024-11-13 NOTE — PROGRESS NOTES
Date of Office Visit: 2024  Encounter Provider: LIZBETH Givens  Place of Service: [facility] CARDIOLOGY  Patient Name: Mag Palomino  :1950    Chief complaint: abnormal ekg    HPI: Mag Palomino is a 74 y.o. female who is a patient of  Dr. Valencia and is new to me today.  Earlier this year she had a partial thyroidectomy.  Perioperatively she had an EKG that showed an anterior infarct with left atrial enlargement.  She was referred to us for follow-up.  Upon review of previous EKGs and stress echocardiogram performed in  the pattern was the same at that time and she had a stress echo that was normal.  She was in the office a year ago she is quite active walks 2 to 3 miles every day.  She has hypertension and hyperlipidemia.  She is here for yearly follow-up.    She comes in today for follow-up.  She denies any chest pain, pressure or tightness. She stays active and walks everyday with her  who is also our patient. Her BP is well controlled at home. Recent lipid panel shows an LDL of 69 and HDL of 62.    Previous testing and notes have been reviewed by me.   Past Medical History:   Diagnosis Date    Abnormal EKG     Acne     Derm Dr. Hui    Allergic     seasonal    Anxiety     Breast cancer 2000    Right lobular (oncology CBC) Dr. James    Constipation     Drug therapy     Fibrocystic breast     H/O bone density study     H/O bone density study 10/06/2016    H/O echocardiogram 2014    Dr De La Torre    H/O mammogram     Headache     History of colonic polyps     Hyperlipidemia     Hypertension     Nocturia     Palpitation     HX    Paresthesia of arm     Personal history of malignant neoplasm of breast     Piriformis syndrome     Small lymphocytic lymphoma     Thyroid nodule        Past Surgical History:   Procedure Laterality Date    BREAST BIOPSY      BREAST LUMPECTOMY Right 2000     SECTION  1979    COLONOSCOPY N/A 2015     Urology H&P    Name:  Latoya Riddle  MRN:  [id number]  Age/: 46 year old, 1970    CC: obstructing ureteral stone    HPI: Latoya Riddle is a(n) 46 year old female with a history of solitary left kidney and recurrent nephrolithiasis (has passed >50 stones) who presented to an outside ER earlier today with left flank pain. Workup at [facility] revealed a 4mm distal obstructing ureteral stone with leukocytosis to 16 and LINDEN. She was subsequently transferred here for stent placement.     On arrival to [facility], she reports that her pain is currently well controlled with medications. She has a history of UTIs but does not feel like she currently has a UTI.     Past Medical History:  Past Medical History:   Diagnosis Date     Hyperlipidemia      Kidney stone        Past Surgical History:  Past Surgical History:   Procedure Laterality Date     EXCISE TOENAIL(S) Left 2016    Procedure: EXCISE TOENAIL(S);  Surgeon: Ady Mejia DPM;  Location: [facility] OR     GENITOURINARY SURGERY       SURGICAL HISTORY OF -            SURGICAL HISTORY OF -   -present    Lithothypsy X 49     SURGICAL HISTORY OF -       Percutaneous nephrostomy X2     SURGICAL HISTORY OF -   2011    Cystoscopy with bilateral ureteral pyeloscopy, stone basketing and stent placement       Allergies:     Allergies   Allergen Reactions     Gentamycin [Gentamicin Sulfate]        Medications:    Current Facility-Administered Medications on File Prior to Encounter:  [COMPLETED] 0.9% sodium chloride BOLUS   [COMPLETED] HYDROmorphone (PF) (DILAUDID) injection 0.5 mg   [COMPLETED] ketorolac (TORADOL) injection 30 mg   [COMPLETED] 0.9% sodium chloride BOLUS   [COMPLETED] HYDROmorphone (PF) (DILAUDID) 0.5 MG/0.5 ML injection   [DISCONTINUED] 0.9% sodium chloride infusion   [DISCONTINUED] ondansetron (ZOFRAN) injection 4 mg     Current Outpatient Prescriptions on File Prior to Encounter:  LORazepam (ATIVAN) 0.5 MG tablet Take  1 tablet (0.5 mg) by mouth every 8 hours as needed for anxiety   oxyCODONE-acetaminophen (PERCOCET) 5-325 MG per tablet Take 1-2 tablets by mouth every 4 hours as needed for pain (moderate to severe)   naproxen sodium (ALEVE) 220 MG tablet Take 220 mg by mouth 2 times daily as needed.   ZYRTEC 10 MG OR TABS 1 TABLET DAILY       Social History:  Social History     Social History     Marital status:      Spouse name: N/A     Number of children: 1     Years of education: N/A     Occupational History      Unemployed      Disabled     due to kidney disease     Social History Main Topics     Smoking status: Current Every Day Smoker     Packs/day: 1.00     Years: 20.00     Types: Cigarettes     Smokeless tobacco: Never Used      Comment: 7/25/17 declined , tried quit plan- did not help per patient      Alcohol use No     Drug use: No     Sexual activity: Yes     Partners: Male     Birth control/ protection: None     Other Topics Concern     Parent/Sibling W/ Cabg, Mi Or Angioplasty Before 65f 55m? Yes     Social History Narrative       Family History:  Family History   Problem Relation Age of Onset     Respiratory Father      CATHY     C.A.D. Father      Heart Failure Father      Coronary Artery Disease Father      Hypertension Father      Depression Father      Anxiety Disorder Father      DIABETES Father      type 2     Lipids Mother      high cholesterol     Allergies Mother      Arthritis Mother      RA     Hyperlipidemia Mother      Lipids Sister      high cholesterol     Allergies Sister      Asthma Sister      Hypertension Sister      Allergies Sister      Respiratory Sister      cathy     Genitourinary Problems Maternal Grandmother      passed from complications from renal failure     Arthritis Maternal Grandfather      rheumatoid     DIABETES Paternal Grandmother      adult onset     Cardiovascular Paternal Grandfather      AAA     Hypertension Sister      Hyperlipidemia Sister      Anxiety Disorder Sister       Dr. Mag York    ENDOMETRIAL BIOPSY  02/2005    Normal    PAP SMEAR  08/01/2013    THYROIDECTOMY Right 4/27/2023    Procedure: RIGHT ZAID-THYROIDECTOMY;  Surgeon: Arden Whitley MD;  Location: [facility];  Service: ENT;  Laterality: Right;    US GUIDED LYMPH NODE BIOPSY  07/22/2022       Social History     Socioeconomic History    Marital status:    Tobacco Use    Smoking status: Never    Smokeless tobacco: Never   Vaping Use    Vaping status: Never Used   Substance and Sexual Activity    Alcohol use: Yes     Alcohol/week: 2.0 - 3.0 standard drinks of alcohol     Types: 1 - 2 Glasses of wine, 1 Cans of beer per week     Comment: social    Drug use: No    Sexual activity: Yes     Partners: Male     Birth control/protection: Post-menopausal       Family History   Problem Relation Age of Onset    Hyperlipidemia Mother     Heart attack Father     Alcohol abuse Father     Heart disease Father     Cancer Sister     Thyroid disease Sister     Heart disease Sister     Anxiety disorder Sister     Depression Sister     Thyroid disease Sister     Cancer Brother     Heart disease Brother     COPD Brother     Ulcers Other     Polycythemia Other     Hyperlipidemia Other     Malig Hyperthermia Neg Hx        Review of Systems   Constitutional: Negative for diaphoresis and malaise/fatigue.   Cardiovascular:  Negative for chest pain, claudication, dyspnea on exertion, irregular heartbeat, leg swelling, near-syncope, orthopnea, palpitations, paroxysmal nocturnal dyspnea and syncope.   Respiratory:  Negative for cough, shortness of breath and sleep disturbances due to breathing.    Musculoskeletal:  Negative for falls.   Neurological:  Negative for dizziness and weakness.   Psychiatric/Behavioral:  Negative for altered mental status and substance abuse.        No Known Allergies      Current Outpatient Medications:     acetaminophen (TYLENOL) 325 MG tablet, Take 2 tablets by mouth Every 6 (Six) Hours As Needed for  "Mild Pain., Disp: , Rfl:     atorvastatin (LIPITOR) 20 MG tablet, TAKE 1 TABLET BY MOUTH DAILY, Disp: 90 tablet, Rfl: 1    cetirizine (ZyrTEC) 10 MG tablet, Take 1 tablet by mouth Every Night., Disp: , Rfl:     docusate sodium (COLACE) 100 MG capsule, Take 1 capsule by mouth Daily., Disp: , Rfl:     lansoprazole (PREVACID) 15 MG capsule, Take 1 capsule by mouth As Needed., Disp: , Rfl:     levothyroxine (Synthroid) 50 MCG tablet, Take 1 tablet by mouth Daily., Disp: 90 tablet, Rfl: 3    Omega-3 Fatty Acids (fish oil) 1000 MG capsule capsule, , Disp: , Rfl:     spironolactone (ALDACTONE) 100 MG tablet, Take 1 tablet by mouth Daily. ACNE, Disp: , Rfl:       Objective:     Vitals:    11/13/24 1433   BP: 138/70   BP Location: Left arm   Patient Position: Sitting   Pulse: 78   SpO2: 99%   Weight: 54 kg (119 lb)   Height: 160 cm (62.99\")     Body mass index is 21.09 kg/m².    PHYSICAL EXAM:    Constitutional:       General: Not in acute distress.     Appearance: Normal appearance. Well-developed.   Eyes:      Pupils: Pupils are equal, round, and reactive to light.   HENT:      Head: Normocephalic.   Neck:      Vascular: No carotid bruit or JVD.   Pulmonary:      Effort: Pulmonary effort is normal. No tachypnea.      Breath sounds: Normal breath sounds. No wheezing. No rales.   Cardiovascular:      Normal rate. Regular rhythm.      Murmurs: There is a grade 1/6 systolic murmur.      No gallop.    Pulses:     Intact distal pulses.   Edema:     Peripheral edema absent.   Abdominal:      General: Bowel sounds are normal.      Palpations: Abdomen is soft.      Tenderness: There is no abdominal tenderness.   Musculoskeletal: Normal range of motion.      Cervical back: Normal range of motion and neck supple. No edema. Skin:     General: Skin is warm and dry.   Neurological:      Mental Status: Alert and oriented to person, place, and time.           ECG 12 Lead    Date/Time: 11/13/2024 2:57 PM  Performed by: Flavia Arenas, " Asthma Sister        ROS:  The remainder of the complete ROS was negative unless noted in the HPI.    Exam:  LMP 2013  General: Alert, interactive, & in NAD  Resp: No respiratory distress  Cardiac: Regular rate; extremities warm  Abdomen: Soft, nontender, nondistended. Left CVA tenderness.    Labs:  All laboratory data reviewed. Notable for Cr 2.3 (baseline 1.6), WBC 16. UA/UC not sent.     Imagin/10/17 CT abd/pelvis:  IMPRESSION:  1. Obstructing 0.4 cm distal left ureteral stone. Marked left  hydronephrosis and perinephric stranding is present. Additional tiny  nonobstructing left renal collecting system stones are noted.  2. Interval right nephrectomy when compared to prior CT.  3. Abdominal and pelvic organs are otherwise within normal limits. No  bowel obstruction, diverticulitis or appendicitis.       Assessment and Plan: Latoya Riddle is a(n) 46 year old female with a solitary left kidney and extensive history of nephrolithiasis, now with a 4mm distal obstructing ureteral stone.    -Admit to urology  -To OR emergently for left ureteral stent placement  -Perioperative ancef     Seen with Dr. Michael Winston MD  Urology PGY4  Pager [phone]     LIZBETH    Authorized by: Flavia Arenas APRN  Comparison: compared with previous ECG from 4/2/2023  Similar to previous ECG  Rhythm: sinus rhythm  Rate: normal  Conduction: left anterior fascicular block  Q waves: I and aVF    QRS axis: normal    Clinical impression: non-specific ECG            Assessment:      Abnormal EKG- unchanged from previous, asymptomatic    2. Mild murmur suggestive of aortic sclerosis    3. Dyslipidemia- controlled on current regimen    4. Essential HTN- controlled on regimen       Plan:       Follow up in 1 year         Your medication list            Accurate as of November 13, 2024  2:44 PM. If you have any questions, ask your nurse or doctor.                CONTINUE taking these medications        Instructions Last Dose Given Next Dose Due   acetaminophen 325 MG tablet  Commonly known as: TYLENOL      Take 2 tablets by mouth Every 6 (Six) Hours As Needed for Mild Pain.       atorvastatin 20 MG tablet  Commonly known as: LIPITOR      TAKE 1 TABLET BY MOUTH DAILY       cetirizine 10 MG tablet  Commonly known as: zyrTEC      Take 1 tablet by mouth Every Night.       docusate sodium 100 MG capsule  Commonly known as: COLACE      Take 1 capsule by mouth Daily.       fish oil 1000 MG capsule capsule           lansoprazole 15 MG capsule  Commonly known as: PREVACID      Take 1 capsule by mouth As Needed.       levothyroxine 50 MCG tablet  Commonly known as: Synthroid      Take 1 tablet by mouth Daily.       spironolactone 100 MG tablet  Commonly known as: ALDACTONE      Take 1 tablet by mouth Daily. ACNE                  As always, it has been a pleasure to participate in your patient's care.      Sincerely,     Flavia NIEVES

## 2024-12-18 RX ORDER — LEVOTHYROXINE SODIUM 50 UG/1
50 TABLET ORAL DAILY
Qty: 90 TABLET | Refills: 1 | Status: SHIPPED | OUTPATIENT
Start: 2024-12-18

## 2025-01-30 ENCOUNTER — HOSPITAL ENCOUNTER (OUTPATIENT)
Facility: HOSPITAL | Age: 75
Discharge: HOME | End: 2025-01-30
Payer: MEDICARE

## 2025-01-30 ENCOUNTER — LAB (OUTPATIENT)
Facility: HOSPITAL | Age: 75
End: 2025-01-30
Payer: MEDICARE

## 2025-01-30 DIAGNOSIS — C83.00 SMALL LYMPHOCYTIC LYMPHOMA: ICD-10-CM

## 2025-01-30 LAB
ALBUMIN SERPL-MCNC: 4.6 G/DL (ref 3.5–5.2)
ALBUMIN/GLOB SERPL: 1.9 G/DL
ALP SERPL-CCNC: 54 U/L (ref 39–117)
ALT SERPL W P-5'-P-CCNC: 17 U/L (ref 1–33)
ANION GAP SERPL CALCULATED.3IONS-SCNC: 7.8 MMOL/L (ref 5–15)
AST SERPL-CCNC: 28 U/L (ref 1–32)
BASOPHILS # BLD AUTO: 0.04 10*3/MM3 (ref 0–0.2)
BASOPHILS NFR BLD AUTO: 0.5 % (ref 0–1.5)
BILIRUB SERPL-MCNC: 0.6 MG/DL (ref 0–1.2)
BUN SERPL-MCNC: 15 MG/DL (ref 8–23)
BUN/CREAT SERPL: 19.5 (ref 7–25)
CALCIUM SPEC-SCNC: 9.6 MG/DL (ref 8.6–10.5)
CHLORIDE SERPL-SCNC: 103 MMOL/L (ref 98–107)
CO2 SERPL-SCNC: 28.2 MMOL/L (ref 22–29)
CREAT SERPL-MCNC: 0.77 MG/DL (ref 0.57–1)
DEPRECATED RDW RBC AUTO: 39.5 FL (ref 37–54)
EGFRCR SERPLBLD CKD-EPI 2021: 81.6 ML/MIN/1.73
EOSINOPHIL # BLD AUTO: 0.1 10*3/MM3 (ref 0–0.4)
EOSINOPHIL NFR BLD AUTO: 1.2 % (ref 0.3–6.2)
ERYTHROCYTE [DISTWIDTH] IN BLOOD BY AUTOMATED COUNT: 12.3 % (ref 12.3–15.4)
GLOBULIN UR ELPH-MCNC: 2.4 GM/DL
GLUCOSE SERPL-MCNC: 93 MG/DL (ref 65–99)
HCT VFR BLD AUTO: 43.1 % (ref 34–46.6)
HGB BLD-MCNC: 14.4 G/DL (ref 12–15.9)
IMM GRANULOCYTES # BLD AUTO: 0.02 10*3/MM3 (ref 0–0.05)
IMM GRANULOCYTES NFR BLD AUTO: 0.2 % (ref 0–0.5)
LDH SERPL-CCNC: 227 U/L (ref 135–214)
LYMPHOCYTES # BLD AUTO: 2.16 10*3/MM3 (ref 0.7–3.1)
LYMPHOCYTES NFR BLD AUTO: 26.6 % (ref 19.6–45.3)
MCH RBC QN AUTO: 29.8 PG (ref 26.6–33)
MCHC RBC AUTO-ENTMCNC: 33.4 G/DL (ref 31.5–35.7)
MCV RBC AUTO: 89 FL (ref 79–97)
MONOCYTES # BLD AUTO: 0.59 10*3/MM3 (ref 0.1–0.9)
MONOCYTES NFR BLD AUTO: 7.3 % (ref 5–12)
NEUTROPHILS NFR BLD AUTO: 5.2 10*3/MM3 (ref 1.7–7)
NEUTROPHILS NFR BLD AUTO: 64.2 % (ref 42.7–76)
NRBC BLD AUTO-RTO: 0 /100 WBC (ref 0–0.2)
PLATELET # BLD AUTO: 228 10*3/MM3 (ref 140–450)
PMV BLD AUTO: 10.5 FL (ref 6–12)
POTASSIUM SERPL-SCNC: 4.4 MMOL/L (ref 3.5–5.2)
PROT SERPL-MCNC: 7 G/DL (ref 6–8.5)
RBC # BLD AUTO: 4.84 10*6/MM3 (ref 3.77–5.28)
SODIUM SERPL-SCNC: 139 MMOL/L (ref 136–145)
WBC NRBC COR # BLD AUTO: 8.11 10*3/MM3 (ref 3.4–10.8)

## 2025-01-30 PROCEDURE — 70491 CT SOFT TISSUE NECK W/DYE: CPT

## 2025-01-30 PROCEDURE — 80053 COMPREHEN METABOLIC PANEL: CPT | Performed by: INTERNAL MEDICINE

## 2025-01-30 PROCEDURE — 85025 COMPLETE CBC W/AUTO DIFF WBC: CPT

## 2025-01-30 PROCEDURE — 71260 CT THORAX DX C+: CPT

## 2025-01-30 PROCEDURE — 74177 CT ABD & PELVIS W/CONTRAST: CPT

## 2025-01-30 PROCEDURE — 83615 LACTATE (LD) (LDH) ENZYME: CPT | Performed by: INTERNAL MEDICINE

## 2025-01-30 PROCEDURE — 25510000001 IOPAMIDOL 61 % SOLUTION: Performed by: INTERNAL MEDICINE

## 2025-01-30 PROCEDURE — 25510000002 DIATRIZOATE MEGLUMINE & SODIUM PER 1 ML: Performed by: INTERNAL MEDICINE

## 2025-01-30 RX ORDER — IOPAMIDOL 612 MG/ML
100 INJECTION, SOLUTION INTRAVASCULAR
Status: COMPLETED | OUTPATIENT
Start: 2025-01-30 | End: 2025-01-30

## 2025-01-30 RX ORDER — DIATRIZOATE MEGLUMINE AND DIATRIZOATE SODIUM 660; 100 MG/ML; MG/ML
30 SOLUTION ORAL; RECTAL
Status: COMPLETED | OUTPATIENT
Start: 2025-01-30 | End: 2025-01-30

## 2025-01-30 RX ADMIN — IOPAMIDOL 85 ML: 612 INJECTION, SOLUTION INTRAVENOUS at 09:28

## 2025-01-30 RX ADMIN — DIATRIZOATE MEGLUMINE AND DIATRIZOATE SODIUM 30 ML: 600; 100 SOLUTION ORAL; RECTAL at 08:08

## 2025-02-04 NOTE — PROGRESS NOTES
"REASON FOR FOLLOWUP:   1.  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  Small lymphocytic lymphoma from axillary lymph node biopsy 7/22/2022    INTERVAL HISTORY:  Mag Palomino is a 74 y.o. female who returns today for follow up    6-month follow-up today.  She continues to feel well.  No fevers, chills, night sweats, unintended weight loss.  She does have a small right anterior cervical lymph node that remains palpable at times.        PE:    Vitals:    02/05/25 1056   BP: 145/77   Pulse: 81   Resp: 16   Temp: 98.1 °F (36.7 °C)   TempSrc: Oral   SpO2: 100%   Weight: 55.8 kg (123 lb)   Height: 160 cm (62.99\")   PainSc: 0-No pain         Physical Exam   Constitutional: She is oriented to person, place, and time. She appears well-developed. No distress.   HENT:   Head: Normocephalic.   Eyes: Pupils are equal, round, and reactive to light.   Neck: No JVD present. No thyromegaly present.   Cardiovascular: Normal rate and regular rhythm. Exam reveals no gallop and no friction rub.   No murmur heard.  Pulmonary/Chest: Effort normal and breath sounds normal. She has no wheezes. She has no rales. Right breast exhibits no mass, no nipple discharge and no skin change. Left breast exhibits no mass, no nipple discharge and no skin change.   Abdominal: Soft. Bowel sounds are normal. She exhibits no distension and no mass. There is no abdominal tenderness.   Musculoskeletal: No deformity.   Lymphadenopathy:     She has cervical adenopathy (Small palpable right anterior cervical node at about 8 mm).        Right cervical: Superficial cervical (Small palpable right anterior cervical node) adenopathy present.   Neurological: She is alert and oriented to person, place, and time. She has normal reflexes. No cranial nerve deficit.   Skin: Skin is dry. No rash noted.   Psychiatric: Judgment normal.         RECENT LABS:  Lactate Dehydrogenase (01/30/2025 08:15)  Comprehensive Metabolic Panel " (01/30/2025 08:15)  CBC & Differential (01/30/2025 08:15)    IMAGING:  CT Abdomen Pelvis With Contrast (01/30/2025 09:27)  CT Soft Tissue Neck With Contrast (01/30/2025 09:27)  CT Chest With Contrast Diagnostic (01/30/2025 09:27)      CT images reviewed.  Most nodes are similar.  Couple have enlarged by few millimeters.  Stable renal cysts.      ASSESSMENT:    *History of lobular carcinoma in situ, excision, 5 years tamoxifen complete April 2005    *Small lymphocytic lymphoma  Lymphadenopathy is very slowly increasing in size.  No indication to treat at this time.  We will continue monitoring.    *History of right lobe thyroid mass status post right hemithyroidectomy with Dr. Whitley 4/27/2023.  No malignancy identified.  Benign adenoma.        PLAN:   Continue observation.  She remains asymptomatic.  The LDH remains very mildly elevated.  No need for treatment based on the lymph node size.  CT imaging and labs in 6 months and I will see her back after that for follow-up  Dr. James previously discussed if treatment is necessary proceeding with 4 weeks of weekly rituximab followed by maintenance therapy if appropriate.  Mammogram will be due in August 2025    I spent 30 minutes this visit today reviewing her record, communicating with her, examining her, placing orders, documenting care..    2/5/2025

## 2025-02-05 ENCOUNTER — OFFICE VISIT (OUTPATIENT)
Dept: ONCOLOGY | Facility: CLINIC | Age: 75
End: 2025-02-05
Payer: MEDICARE

## 2025-02-05 VITALS
HEART RATE: 81 BPM | TEMPERATURE: 98.1 F | RESPIRATION RATE: 16 BRPM | WEIGHT: 123 LBS | SYSTOLIC BLOOD PRESSURE: 145 MMHG | OXYGEN SATURATION: 100 % | HEIGHT: 63 IN | BODY MASS INDEX: 21.79 KG/M2 | DIASTOLIC BLOOD PRESSURE: 77 MMHG

## 2025-02-05 DIAGNOSIS — C83.00 SMALL LYMPHOCYTIC LYMPHOMA: Primary | ICD-10-CM

## 2025-04-23 ENCOUNTER — OFFICE VISIT (OUTPATIENT)
Dept: INTERNAL MEDICINE | Facility: CLINIC | Age: 75
End: 2025-04-23
Payer: MEDICARE

## 2025-04-23 VITALS
OXYGEN SATURATION: 98 % | SYSTOLIC BLOOD PRESSURE: 138 MMHG | HEART RATE: 70 BPM | HEIGHT: 63 IN | DIASTOLIC BLOOD PRESSURE: 80 MMHG | BODY MASS INDEX: 21.62 KG/M2 | WEIGHT: 122 LBS

## 2025-04-23 DIAGNOSIS — N89.8 VAGINAL DRYNESS: Primary | ICD-10-CM

## 2025-04-23 DIAGNOSIS — C83.00 SMALL LYMPHOCYTIC LYMPHOMA: Chronic | ICD-10-CM

## 2025-04-23 DIAGNOSIS — E78.2 MIXED HYPERLIPIDEMIA: Chronic | ICD-10-CM

## 2025-04-23 DIAGNOSIS — E89.0 POSTOPERATIVE HYPOTHYROIDISM: Chronic | ICD-10-CM

## 2025-04-23 DIAGNOSIS — Z23 ENCOUNTER FOR IMMUNIZATION: ICD-10-CM

## 2025-04-23 DIAGNOSIS — D05.02 NEOPLASM OF LEFT BREAST, PRIMARY TUMOR STAGING CATEGORY TIS: LOBULAR CARCINOMA IN SITU (LCIS): Chronic | ICD-10-CM

## 2025-04-23 LAB
CHOLEST SERPL-MCNC: 170 MG/DL (ref 0–200)
HDLC SERPL-MCNC: 60 MG/DL (ref 40–60)
LDLC SERPL CALC-MCNC: 93 MG/DL (ref 0–100)
TRIGL SERPL-MCNC: 91 MG/DL (ref 0–150)
TSH SERPL DL<=0.005 MIU/L-ACNC: 2.25 UIU/ML (ref 0.27–4.2)
VLDLC SERPL CALC-MCNC: 17 MG/DL (ref 5–40)

## 2025-04-23 NOTE — ASSESSMENT & PLAN NOTE
We discussed options including replens or vaginal estrogen  She would like to proceed with replens for now

## 2025-04-23 NOTE — ASSESSMENT & PLAN NOTE
Stable on atorvastatin  Recommend following a low saturated fat, low sugar diet and getting 150 minutes of weekly exercise.

## 2025-04-23 NOTE — ASSESSMENT & PLAN NOTE
Continues on synthroid  Advised to take medication on empty stomach; wait at least 30 minutes before eating, drinking, or taking other medications  Take medication at same time everyday

## 2025-04-23 NOTE — PROGRESS NOTES
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 Reports taking this by itself.      HLD-- continues on atorvastatin; continues with active lifestyle; following healthy diet choices.      Continues on aldactone for acne per dermatology.      Continues with prevacid for acid reflux OTC; reports overall things are stable. Denies any break through symptoms currently.     Continues following with CBC for surveillance of small lymphocytic lymphoma-- cont under surveillance  She reports hx of vaginal dryness-- she does have hx of breast cancer but with recent data changes, she is curious as far as what products she could use.       Objective   Vital Signs:  /80 (BP Location: Left arm, Patient Position: Sitting, Cuff Size: Adult)   Pulse 70   Ht 160 cm (62.99\")   Wt 55.3 kg (122 lb)   SpO2 98%   BMI 21.62 kg/m²   Estimated body mass index is 21.62 kg/m² as calculated from the following:    Height as of this encounter: 160 cm (62.99\").    Weight as of this encounter: 55.3 kg (122 lb).    BMI is within normal parameters. No other follow-up for BMI required.      Physical Exam  Constitutional:       General: She is awake.      Appearance: Normal appearance.   HENT:      Head: Normocephalic and atraumatic.      Right Ear: Hearing and external ear normal.      Left Ear: Hearing and external ear normal.      Nose: Nose normal.      Mouth/Throat:      Lips: Pink.      Mouth: Mucous membranes are moist.      Pharynx: Oropharynx is clear.   Eyes:      Extraocular Movements: Extraocular movements intact.      Conjunctiva/sclera: Conjunctivae normal.      Pupils: Pupils are equal, round, and reactive to light.   Cardiovascular:      Rate and Rhythm: Normal rate and regular " rhythm.      Pulses: Normal pulses.      Heart sounds: Normal heart sounds. No murmur heard.     No gallop.   Pulmonary:      Effort: Pulmonary effort is normal. No respiratory distress.      Breath sounds: Normal breath sounds. No stridor. No wheezing, rhonchi or rales.   Musculoskeletal:      Cervical back: Normal range of motion and neck supple.   Skin:     General: Skin is warm and dry.      Capillary Refill: Capillary refill takes less than 2 seconds.   Neurological:      General: No focal deficit present.      Mental Status: She is alert and oriented to person, place, and time. Mental status is at baseline.      Motor: Motor function is intact.      Coordination: Coordination is intact.      Gait: Gait is intact.      Deep Tendon Reflexes: Reflexes are normal and symmetric.   Psychiatric:         Attention and Perception: Attention normal.         Mood and Affect: Mood normal.         Speech: Speech normal.         Behavior: Behavior normal. Behavior is cooperative.         Thought Content: Thought content normal.         Cognition and Memory: Cognition normal.         Judgment: Judgment normal.        Result Review :  The following data was reviewed by: LIZBETH Navarro on 04/23/2025:  Common labs          7/25/2024    08:58 10/23/2024    11:12 1/30/2025    08:15   Common Labs   Glucose 82   93    BUN 13   15    Creatinine 0.83   0.77    Sodium 139   139    Potassium 4.4   4.4    Chloride 102   103    Calcium 9.5   9.6    Albumin 4.8   4.6    Total Bilirubin 0.4   0.6    Alkaline Phosphatase 52   54    AST (SGOT) 28   28    ALT (SGPT) 21   17    WBC 5.85   8.11    Hemoglobin 14.1   14.4    Hematocrit 43.1   43.1    Platelets 211   228    Total Cholesterol  146     Triglycerides  77     HDL Cholesterol  62     LDL Cholesterol   69       Tobacco Use: Low Risk  (4/23/2025)    Patient History     Smoking Tobacco Use: Never     Smokeless Tobacco Use: Never     Passive Exposure: Not on file     Social History      Substance and Sexual Activity   Alcohol Use Yes    Alcohol/week: 1.0 standard drink of alcohol    Types: 1 Glasses of wine per week    Comment: social     Family History   Problem Relation Age of Onset    Hyperlipidemia Mother     Hypertension Mother     Heart attack Father     Alcohol abuse Father     Heart disease Father     Cancer Sister     Thyroid disease Sister     Heart disease Sister     Anxiety disorder Sister     Depression Sister     Thyroid disease Sister     Cancer Brother     Heart disease Brother     COPD Brother     Ulcers Other     Polycythemia Other     Hyperlipidemia Other     Hypertension Sister     Heart attack Sister     Malig Hyperthermia Neg Hx                Assessment and Plan   Diagnoses and all orders for this visit:    1. Vaginal dryness (Primary)  Assessment & Plan:  We discussed options including replens or vaginal estrogen  She would like to proceed with replens for now       2. Mixed hyperlipidemia  Assessment & Plan:  Stable on atorvastatin  Recommend following a low saturated fat, low sugar diet and getting 150 minutes of weekly exercise.       Orders:  -     Lipid Panel    3. Postoperative hypothyroidism  Assessment & Plan:  Continues on synthroid  Advised to take medication on empty stomach; wait at least 30 minutes before eating, drinking, or taking other medications  Take medication at same time everyday      Orders:  -     TSH Rfx On Abnormal To Free T4    4. Small lymphocytic lymphoma  Assessment & Plan:  Under surveillance  Following with Dr. Baca   1/2025        5. Neoplasm of left breast, primary tumor staging category Tis: lobular carcinoma in situ (LCIS)  Assessment & Plan:  Continues with yearly mammograms      6. Encounter for immunization  -     COVID-19 (Pfizer) 12yrs+ (COMIRNATY)             Follow Up   Return in about 6 months (around 10/23/2025) for Medicare Wellness.  Patient was given instructions and counseling regarding her condition or for health  maintenance advice. Please see specific information pulled into the AVS if appropriate.

## 2025-05-07 DIAGNOSIS — E78.2 MIXED HYPERLIPIDEMIA: ICD-10-CM

## 2025-05-07 RX ORDER — ATORVASTATIN CALCIUM 20 MG/1
20 TABLET, FILM COATED ORAL DAILY
Qty: 90 TABLET | Refills: 1 | Status: SHIPPED | OUTPATIENT
Start: 2025-05-07

## 2025-06-16 RX ORDER — LEVOTHYROXINE SODIUM 50 UG/1
50 TABLET ORAL DAILY
Qty: 90 TABLET | Refills: 1 | Status: SHIPPED | OUTPATIENT
Start: 2025-06-16

## 2025-06-25 ENCOUNTER — TRANSCRIBE ORDERS (OUTPATIENT)
Dept: ADMINISTRATIVE | Facility: HOSPITAL | Age: 75
End: 2025-06-25
Payer: MEDICARE

## 2025-06-25 DIAGNOSIS — Z12.31 SCREENING MAMMOGRAM FOR BREAST CANCER: Primary | ICD-10-CM

## 2025-08-06 ENCOUNTER — LAB (OUTPATIENT)
Facility: HOSPITAL | Age: 75
End: 2025-08-06
Payer: MEDICARE

## 2025-08-06 ENCOUNTER — HOSPITAL ENCOUNTER (OUTPATIENT)
Facility: HOSPITAL | Age: 75
Discharge: HOME | End: 2025-08-06
Payer: MEDICARE

## 2025-08-06 DIAGNOSIS — C83.00 SMALL LYMPHOCYTIC LYMPHOMA: ICD-10-CM

## 2025-08-06 LAB
ALBUMIN SERPL-MCNC: 4.5 G/DL (ref 3.5–5.2)
ALBUMIN/GLOB SERPL: 1.9 G/DL
ALP SERPL-CCNC: 51 U/L (ref 39–117)
ALT SERPL W P-5'-P-CCNC: 19 U/L (ref 1–33)
ANION GAP SERPL CALCULATED.3IONS-SCNC: 9.6 MMOL/L (ref 5–15)
AST SERPL-CCNC: 25 U/L (ref 1–32)
BASOPHILS # BLD AUTO: 0.04 10*3/MM3 (ref 0–0.2)
BASOPHILS NFR BLD AUTO: 0.6 % (ref 0–1.5)
BILIRUB SERPL-MCNC: 0.4 MG/DL (ref 0–1.2)
BUN SERPL-MCNC: 16 MG/DL (ref 8–23)
BUN/CREAT SERPL: 23.5 (ref 7–25)
CALCIUM SPEC-SCNC: 9.5 MG/DL (ref 8.6–10.5)
CHLORIDE SERPL-SCNC: 104 MMOL/L (ref 98–107)
CO2 SERPL-SCNC: 26.4 MMOL/L (ref 22–29)
CREAT SERPL-MCNC: 0.68 MG/DL (ref 0.57–1)
DEPRECATED RDW RBC AUTO: 43.8 FL (ref 37–54)
EGFRCR SERPLBLD CKD-EPI 2021: 91.5 ML/MIN/1.73
EOSINOPHIL # BLD AUTO: 0.1 10*3/MM3 (ref 0–0.4)
EOSINOPHIL NFR BLD AUTO: 1.4 % (ref 0.3–6.2)
ERYTHROCYTE [DISTWIDTH] IN BLOOD BY AUTOMATED COUNT: 13.1 % (ref 12.3–15.4)
GLOBULIN UR ELPH-MCNC: 2.4 GM/DL
GLUCOSE SERPL-MCNC: 93 MG/DL (ref 65–99)
HCT VFR BLD AUTO: 42.3 % (ref 34–46.6)
HGB BLD-MCNC: 14.1 G/DL (ref 12–15.9)
IMM GRANULOCYTES # BLD AUTO: 0.01 10*3/MM3 (ref 0–0.05)
IMM GRANULOCYTES NFR BLD AUTO: 0.1 % (ref 0–0.5)
LDH SERPL-CCNC: 203 U/L (ref 135–214)
LYMPHOCYTES # BLD AUTO: 3.06 10*3/MM3 (ref 0.7–3.1)
LYMPHOCYTES NFR BLD AUTO: 42.7 % (ref 19.6–45.3)
MCH RBC QN AUTO: 30.7 PG (ref 26.6–33)
MCHC RBC AUTO-ENTMCNC: 33.3 G/DL (ref 31.5–35.7)
MCV RBC AUTO: 92 FL (ref 79–97)
MONOCYTES # BLD AUTO: 0.59 10*3/MM3 (ref 0.1–0.9)
MONOCYTES NFR BLD AUTO: 8.2 % (ref 5–12)
NEUTROPHILS NFR BLD AUTO: 3.37 10*3/MM3 (ref 1.7–7)
NEUTROPHILS NFR BLD AUTO: 47 % (ref 42.7–76)
NRBC BLD AUTO-RTO: 0 /100 WBC (ref 0–0.2)
PLATELET # BLD AUTO: 196 10*3/MM3 (ref 140–450)
PMV BLD AUTO: 10.4 FL (ref 6–12)
POTASSIUM SERPL-SCNC: 4.4 MMOL/L (ref 3.5–5.2)
PROT SERPL-MCNC: 6.9 G/DL (ref 6–8.5)
RBC # BLD AUTO: 4.6 10*6/MM3 (ref 3.77–5.28)
SODIUM SERPL-SCNC: 140 MMOL/L (ref 136–145)
WBC NRBC COR # BLD AUTO: 7.17 10*3/MM3 (ref 3.4–10.8)

## 2025-08-06 PROCEDURE — 85025 COMPLETE CBC W/AUTO DIFF WBC: CPT | Performed by: INTERNAL MEDICINE

## 2025-08-06 PROCEDURE — 25510000001 IOPAMIDOL 61 % SOLUTION: Performed by: INTERNAL MEDICINE

## 2025-08-06 PROCEDURE — 70491 CT SOFT TISSUE NECK W/DYE: CPT

## 2025-08-06 PROCEDURE — 71260 CT THORAX DX C+: CPT

## 2025-08-06 PROCEDURE — 83615 LACTATE (LD) (LDH) ENZYME: CPT

## 2025-08-06 PROCEDURE — 80053 COMPREHEN METABOLIC PANEL: CPT | Performed by: INTERNAL MEDICINE

## 2025-08-06 PROCEDURE — 74177 CT ABD & PELVIS W/CONTRAST: CPT

## 2025-08-06 PROCEDURE — 25510000002 DIATRIZOATE MEGLUMINE & SODIUM PER 1 ML: Performed by: INTERNAL MEDICINE

## 2025-08-06 RX ORDER — DIATRIZOATE MEGLUMINE AND DIATRIZOATE SODIUM 660; 100 MG/ML; MG/ML
30 SOLUTION ORAL; RECTAL
Status: COMPLETED | OUTPATIENT
Start: 2025-08-06 | End: 2025-08-06

## 2025-08-06 RX ORDER — IOPAMIDOL 612 MG/ML
100 INJECTION, SOLUTION INTRAVASCULAR
Status: COMPLETED | OUTPATIENT
Start: 2025-08-06 | End: 2025-08-06

## 2025-08-06 RX ADMIN — DIATRIZOATE MEGLUMINE AND DIATRIZOATE SODIUM 30 ML: 600; 100 SOLUTION ORAL; RECTAL at 08:08

## 2025-08-06 RX ADMIN — IOPAMIDOL 85 ML: 612 INJECTION, SOLUTION INTRAVENOUS at 09:14

## 2025-08-13 ENCOUNTER — OFFICE VISIT (OUTPATIENT)
Dept: ONCOLOGY | Facility: CLINIC | Age: 75
End: 2025-08-13
Payer: MEDICARE

## 2025-08-13 VITALS
SYSTOLIC BLOOD PRESSURE: 129 MMHG | OXYGEN SATURATION: 98 % | RESPIRATION RATE: 16 BRPM | TEMPERATURE: 98.1 F | HEIGHT: 63 IN | WEIGHT: 121.8 LBS | HEART RATE: 76 BPM | DIASTOLIC BLOOD PRESSURE: 74 MMHG | BODY MASS INDEX: 21.58 KG/M2

## 2025-08-13 DIAGNOSIS — C83.00 SMALL LYMPHOCYTIC LYMPHOMA: Primary | ICD-10-CM

## 2025-08-15 ENCOUNTER — TELEPHONE (OUTPATIENT)
Dept: ONCOLOGY | Facility: CLINIC | Age: 75
End: 2025-08-15
Payer: MEDICARE

## 2025-08-15 DIAGNOSIS — R59.1 LYMPHADENOPATHY: Primary | ICD-10-CM

## 2025-08-18 ENCOUNTER — TELEPHONE (OUTPATIENT)
Dept: ONCOLOGY | Facility: CLINIC | Age: 75
End: 2025-08-18
Payer: MEDICARE

## 2025-08-18 ENCOUNTER — HOSPITAL ENCOUNTER (OUTPATIENT)
Dept: CT IMAGING | Facility: HOSPITAL | Age: 75
Discharge: HOME | End: 2025-08-18
Payer: MEDICARE

## 2025-08-18 VITALS
SYSTOLIC BLOOD PRESSURE: 100 MMHG | TEMPERATURE: 98 F | HEART RATE: 68 BPM | HEIGHT: 63 IN | WEIGHT: 120.5 LBS | RESPIRATION RATE: 16 BRPM | BODY MASS INDEX: 21.35 KG/M2 | DIASTOLIC BLOOD PRESSURE: 53 MMHG | OXYGEN SATURATION: 97 %

## 2025-08-18 DIAGNOSIS — R59.1 LYMPHADENOPATHY: ICD-10-CM

## 2025-08-18 LAB
INR PPP: 1.1 (ref 0.8–1.2)
PLATELET # BLD AUTO: 186 10*3/MM3 (ref 140–450)
PROTHROMBIN TIME: 11.4 SECONDS (ref 12.8–15.2)

## 2025-08-18 PROCEDURE — 25010000002 FENTANYL CITRATE (PF) 50 MCG/ML SOLUTION: Performed by: RADIOLOGY

## 2025-08-18 PROCEDURE — 85049 AUTOMATED PLATELET COUNT: CPT | Performed by: RADIOLOGY

## 2025-08-18 PROCEDURE — 25010000002 MIDAZOLAM PER 1 MG: Performed by: RADIOLOGY

## 2025-08-18 PROCEDURE — 85610 PROTHROMBIN TIME: CPT

## 2025-08-18 PROCEDURE — 99152 MOD SED SAME PHYS/QHP 5/>YRS: CPT

## 2025-08-18 PROCEDURE — 25010000002 LIDOCAINE 1 % SOLUTION: Performed by: RADIOLOGY

## 2025-08-18 PROCEDURE — 77012 CT SCAN FOR NEEDLE BIOPSY: CPT

## 2025-08-18 RX ORDER — SODIUM CHLORIDE 0.9 % (FLUSH) 0.9 %
3 SYRINGE (ML) INJECTION EVERY 12 HOURS SCHEDULED
Status: DISCONTINUED | OUTPATIENT
Start: 2025-08-18 | End: 2025-08-19 | Stop reason: HOSPADM

## 2025-08-18 RX ORDER — LIDOCAINE HYDROCHLORIDE 10 MG/ML
20 INJECTION, SOLUTION INFILTRATION; PERINEURAL ONCE
Status: COMPLETED | OUTPATIENT
Start: 2025-08-18 | End: 2025-08-18

## 2025-08-18 RX ORDER — FENTANYL CITRATE 50 UG/ML
INJECTION, SOLUTION INTRAMUSCULAR; INTRAVENOUS AS NEEDED
Status: COMPLETED | OUTPATIENT
Start: 2025-08-18 | End: 2025-08-18

## 2025-08-18 RX ORDER — SODIUM CHLORIDE 0.9 % (FLUSH) 0.9 %
10 SYRINGE (ML) INJECTION AS NEEDED
Status: DISCONTINUED | OUTPATIENT
Start: 2025-08-18 | End: 2025-08-19 | Stop reason: HOSPADM

## 2025-08-18 RX ORDER — SODIUM CHLORIDE 9 MG/ML
40 INJECTION, SOLUTION INTRAVENOUS AS NEEDED
Status: DISCONTINUED | OUTPATIENT
Start: 2025-08-18 | End: 2025-08-19 | Stop reason: HOSPADM

## 2025-08-18 RX ORDER — SODIUM CHLORIDE 9 MG/ML
25 INJECTION, SOLUTION INTRAVENOUS ONCE
Status: DISCONTINUED | OUTPATIENT
Start: 2025-08-18 | End: 2025-08-19 | Stop reason: HOSPADM

## 2025-08-18 RX ORDER — MIDAZOLAM HYDROCHLORIDE 1 MG/ML
INJECTION, SOLUTION INTRAMUSCULAR; INTRAVENOUS AS NEEDED
Status: COMPLETED | OUTPATIENT
Start: 2025-08-18 | End: 2025-08-18

## 2025-08-18 RX ADMIN — LIDOCAINE HYDROCHLORIDE 20 ML: 10 INJECTION, SOLUTION INFILTRATION; PERINEURAL at 10:07

## 2025-08-18 RX ADMIN — MIDAZOLAM 1 MG: 1 INJECTION INTRAMUSCULAR; INTRAVENOUS at 10:04

## 2025-08-18 RX ADMIN — FENTANYL CITRATE 25 MCG: 50 INJECTION, SOLUTION INTRAMUSCULAR; INTRAVENOUS at 10:04

## 2025-08-18 RX ADMIN — FENTANYL CITRATE 25 MCG: 50 INJECTION, SOLUTION INTRAMUSCULAR; INTRAVENOUS at 10:08

## 2025-08-19 ENCOUNTER — TELEPHONE (OUTPATIENT)
Dept: RADIOLOGY | Facility: HOSPITAL | Age: 75
End: 2025-08-19
Payer: MEDICARE

## 2025-08-19 LAB
CYTO UR: NORMAL
DX PRELIMINARY: NORMAL
LAB AP CASE REPORT: NORMAL
LAB AP SPECIAL STAINS: NORMAL
PATH REPORT.GROSS SPEC: NORMAL

## 2025-08-21 ENCOUNTER — HOSPITAL ENCOUNTER (OUTPATIENT)
Dept: MAMMOGRAPHY | Facility: HOSPITAL | Age: 75
Discharge: HOME | End: 2025-08-21
Admitting: NURSE PRACTITIONER
Payer: MEDICARE

## 2025-08-21 DIAGNOSIS — Z12.31 SCREENING MAMMOGRAM FOR BREAST CANCER: ICD-10-CM

## 2025-08-21 PROCEDURE — 77063 BREAST TOMOSYNTHESIS BI: CPT

## 2025-08-21 PROCEDURE — 77067 SCR MAMMO BI INCL CAD: CPT

## (undated) DEVICE — SUT ETHLN 5/0 PS2 18IN 1666H

## (undated) DEVICE — TOWEL,OR,DSP,ST,BLUE,STD,4/PK,20PK/CS: Brand: MEDLINE

## (undated) DEVICE — GAUZE,SPONGE,4"X4",16PLY,XRAY,STRL,LF: Brand: MEDLINE

## (undated) DEVICE — GOWN,NON-REINFORCED,SIRUS,SET IN SLV,XL: Brand: MEDLINE

## (undated) DEVICE — INTENDED FOR TISSUE SEPARATION, AND OTHER PROCEDURES THAT REQUIRE A SHARP SURGICAL BLADE TO PUNCTURE OR CUT.: Brand: BARD-PARKER ® CARBON RIB-BACK BLADES

## (undated) DEVICE — PCH INST SURG INVISISHIELD 2PCKT

## (undated) DEVICE — TBG PENCL TELESCP MEGADYNE SMOKE EVAC 10FT

## (undated) DEVICE — SKIN PREP TRAY W/CHG: Brand: MEDLINE INDUSTRIES, INC.

## (undated) DEVICE — ANTIBACTERIAL UNDYED BRAIDED (POLYGLACTIN 910), SYNTHETIC ABSORBABLE SUTURE: Brand: COATED VICRYL

## (undated) DEVICE — JACKSON-PRATT 100CC BULB RESERVOIR: Brand: CARDINAL HEALTH

## (undated) DEVICE — DRAPE,REIN 53X77,STERILE: Brand: MEDLINE

## (undated) DEVICE — PK ENT 40

## (undated) DEVICE — Device

## (undated) DEVICE — DRN JP RND NO TROC SIL 10F 1/8IN

## (undated) DEVICE — DISPOSABLE BIPOLAR CABLE 12FT. (3.6M): Brand: KIRWAN

## (undated) DEVICE — GLV SURG BIOGEL LTX PF 7 1/2

## (undated) DEVICE — STANDARD HYPODERMIC NEEDLE,POLYPROPYLENE HUB: Brand: MONOJECT

## (undated) DEVICE — TRAP FLD MINIVAC MEGADYNE 100ML

## (undated) DEVICE — HS FOCUS 17 CM PLUS ADAPTIVE: Brand: HARMONIC

## (undated) DEVICE — SUT SILK 3/0 FS1 18IN 684G

## (undated) DEVICE — IRRIGATOR BULB ASEPTO 60CC STRL